# Patient Record
Sex: MALE | Race: BLACK OR AFRICAN AMERICAN | Employment: OTHER | URBAN - METROPOLITAN AREA
[De-identification: names, ages, dates, MRNs, and addresses within clinical notes are randomized per-mention and may not be internally consistent; named-entity substitution may affect disease eponyms.]

---

## 2017-01-09 ENCOUNTER — OFFICE VISIT (OUTPATIENT)
Dept: FAMILY MEDICINE | Facility: CLINIC | Age: 82
End: 2017-01-09
Payer: COMMERCIAL

## 2017-01-09 VITALS
DIASTOLIC BLOOD PRESSURE: 62 MMHG | RESPIRATION RATE: 20 BRPM | WEIGHT: 168.4 LBS | OXYGEN SATURATION: 97 % | HEART RATE: 69 BPM | SYSTOLIC BLOOD PRESSURE: 118 MMHG | HEIGHT: 64 IN | BODY MASS INDEX: 28.75 KG/M2 | TEMPERATURE: 97.4 F

## 2017-01-09 DIAGNOSIS — R06.2 WHEEZING: ICD-10-CM

## 2017-01-09 DIAGNOSIS — M47.812 CERVICAL SPONDYLOSIS WITHOUT MYELOPATHY: ICD-10-CM

## 2017-01-09 DIAGNOSIS — G89.29 CHRONIC LOW BACK PAIN WITH SCIATICA, SCIATICA LATERALITY UNSPECIFIED, UNSPECIFIED BACK PAIN LATERALITY: Chronic | ICD-10-CM

## 2017-01-09 DIAGNOSIS — M54.40 CHRONIC LOW BACK PAIN WITH SCIATICA, SCIATICA LATERALITY UNSPECIFIED, UNSPECIFIED BACK PAIN LATERALITY: Chronic | ICD-10-CM

## 2017-01-09 DIAGNOSIS — R31.0 GROSS HEMATURIA: Primary | ICD-10-CM

## 2017-01-09 DIAGNOSIS — G89.29 CHRONIC RIGHT-SIDED LOW BACK PAIN WITH SCIATICA, SCIATICA LATERALITY UNSPECIFIED: ICD-10-CM

## 2017-01-09 DIAGNOSIS — N28.1 RENAL CYST: ICD-10-CM

## 2017-01-09 DIAGNOSIS — R11.0 NAUSEA: ICD-10-CM

## 2017-01-09 DIAGNOSIS — M25.561 ACUTE PAIN OF RIGHT KNEE: Chronic | ICD-10-CM

## 2017-01-09 DIAGNOSIS — E78.5 HYPERLIPIDEMIA LDL GOAL <100: Chronic | ICD-10-CM

## 2017-01-09 DIAGNOSIS — G89.29 CHRONIC PAIN OF RIGHT KNEE: Chronic | ICD-10-CM

## 2017-01-09 DIAGNOSIS — I10 HYPERTENSION GOAL BP (BLOOD PRESSURE) < 140/80: Chronic | ICD-10-CM

## 2017-01-09 DIAGNOSIS — H92.02 OTALGIA OF LEFT EAR: ICD-10-CM

## 2017-01-09 DIAGNOSIS — E11.9 TYPE 2 DIABETES MELLITUS WITHOUT COMPLICATION, WITHOUT LONG-TERM CURRENT USE OF INSULIN (H): Chronic | ICD-10-CM

## 2017-01-09 DIAGNOSIS — H04.123 DRY EYES: ICD-10-CM

## 2017-01-09 DIAGNOSIS — E55.9 VITAMIN D DEFICIENCY: ICD-10-CM

## 2017-01-09 DIAGNOSIS — M54.40 CHRONIC RIGHT-SIDED LOW BACK PAIN WITH SCIATICA, SCIATICA LATERALITY UNSPECIFIED: ICD-10-CM

## 2017-01-09 DIAGNOSIS — S80.822A: ICD-10-CM

## 2017-01-09 DIAGNOSIS — M25.561 CHRONIC PAIN OF RIGHT KNEE: Chronic | ICD-10-CM

## 2017-01-09 DIAGNOSIS — K21.9 GASTROESOPHAGEAL REFLUX DISEASE WITHOUT ESOPHAGITIS: Chronic | ICD-10-CM

## 2017-01-09 DIAGNOSIS — R25.1 TREMOR: ICD-10-CM

## 2017-01-09 DIAGNOSIS — I10 ESSENTIAL HYPERTENSION, BENIGN: ICD-10-CM

## 2017-01-09 PROCEDURE — 99214 OFFICE O/P EST MOD 30 MIN: CPT | Performed by: FAMILY MEDICINE

## 2017-01-09 RX ORDER — POLYVINYL ALCOHOL 14 MG/ML
1 SOLUTION/ DROPS OPHTHALMIC PRN
Qty: 15 ML | Status: SHIPPED
Start: 2017-01-09 | End: 2018-06-01

## 2017-01-09 RX ORDER — CARBIDOPA AND LEVODOPA 25; 100 MG/1; MG/1
0.5 TABLET ORAL 3 TIMES DAILY
Qty: 135 TABLET | Refills: 1 | Status: SHIPPED | OUTPATIENT
Start: 2017-01-09 | End: 2018-02-04

## 2017-01-09 RX ORDER — ERGOCALCIFEROL 1.25 MG/1
CAPSULE, LIQUID FILLED ORAL
Qty: 4 CAPSULE | Refills: 5 | Status: SHIPPED | OUTPATIENT
Start: 2017-01-09 | End: 2018-02-04

## 2017-01-09 RX ORDER — ACETAMINOPHEN 500 MG
TABLET ORAL
Qty: 100 TABLET | Refills: 5 | Status: SHIPPED | OUTPATIENT
Start: 2017-01-09 | End: 2018-03-02

## 2017-01-09 RX ORDER — METOPROLOL SUCCINATE 50 MG/1
50 TABLET, EXTENDED RELEASE ORAL AT BEDTIME
Qty: 90 TABLET | Refills: 3 | Status: SHIPPED | OUTPATIENT
Start: 2017-01-09 | End: 2018-02-06

## 2017-01-09 RX ORDER — TRIAMCINOLONE ACETONIDE 1 MG/G
CREAM TOPICAL
Qty: 80 G | Refills: 0 | Status: SHIPPED | OUTPATIENT
Start: 2017-01-09 | End: 2018-06-01

## 2017-01-09 RX ORDER — METOPROLOL SUCCINATE 50 MG/1
50 TABLET, EXTENDED RELEASE ORAL AT BEDTIME
Qty: 90 TABLET | Refills: 0 | Status: SHIPPED | OUTPATIENT
Start: 2017-01-09 | End: 2017-01-09

## 2017-01-09 RX ORDER — ALBUTEROL SULFATE 90 UG/1
2 AEROSOL, METERED RESPIRATORY (INHALATION) EVERY 6 HOURS PRN
Qty: 1 INHALER | Refills: 1 | Status: SHIPPED | OUTPATIENT
Start: 2017-01-09 | End: 2018-06-01

## 2017-01-09 RX ORDER — METOPROLOL SUCCINATE 50 MG/1
50 TABLET, EXTENDED RELEASE ORAL AT BEDTIME
Qty: 90 TABLET | Refills: 0 | Status: SHIPPED | OUTPATIENT
Start: 2017-01-09 | End: 2017-07-18

## 2017-01-09 ASSESSMENT — ANXIETY QUESTIONNAIRES
6. BECOMING EASILY ANNOYED OR IRRITABLE: NOT AT ALL
2. NOT BEING ABLE TO STOP OR CONTROL WORRYING: NOT AT ALL
5. BEING SO RESTLESS THAT IT IS HARD TO SIT STILL: NOT AT ALL
GAD7 TOTAL SCORE: 0
1. FEELING NERVOUS, ANXIOUS, OR ON EDGE: NOT AT ALL
7. FEELING AFRAID AS IF SOMETHING AWFUL MIGHT HAPPEN: NOT AT ALL
IF YOU CHECKED OFF ANY PROBLEMS ON THIS QUESTIONNAIRE, HOW DIFFICULT HAVE THESE PROBLEMS MADE IT FOR YOU TO DO YOUR WORK, TAKE CARE OF THINGS AT HOME, OR GET ALONG WITH OTHER PEOPLE: NOT DIFFICULT AT ALL
3. WORRYING TOO MUCH ABOUT DIFFERENT THINGS: NOT AT ALL

## 2017-01-09 ASSESSMENT — PATIENT HEALTH QUESTIONNAIRE - PHQ9: 5. POOR APPETITE OR OVEREATING: NOT AT ALL

## 2017-01-09 NOTE — PROGRESS NOTES
SUBJECTIVE:                                                    Asha Sanford is a 82 year old male who presents to clinic today for the following health issues:  Health Maintenance Due   Topic Date Due     AHMET QUESTIONNAIRE 1 YEAR  1935     PHQ-9 Q1YR (NO INBASKET)  1935     DIABETIC EDUCATION Q1 YEAR (NO INBASKET)  01/01/1936     EYE EXAM Q1 YEAR( NO INBASKET)  09/11/2015     PNEUMOCOCCAL (2 of 2 - PCV13) 10/11/2015     FOOT EXAM Q1 YEAR( NO INBASKET)  01/05/2017     Health Maintenance reviewed at today's visit patient asked to schedule/complete:   Depression:  Completed at today's visit.    ED/UC Followup:    Facility:  [facility]  Date of visit: 1/1/2017  Reason for visit: hematuria  Current Status: feeling better     Indication:  Hematuria.    Technique:  Axial images were acquired through the abdomen and pelvis prior to and then after the intravenous administration of 100 mL of Omnipaque (utilizing a split bolus protocol).  Sagittal and coronal reconstructions.    Comparison:  None.    Findings:  Visualized lower chest:  Heart size is upper limits of normal.  No pericardial effusion.  In general, the lung bases are clear.  No pleural fluid.    Kidneys, ureters and urinary bladder:  No urinary tract stones or hydroureteronephrosis.  There is normal symmetric enhancement of the kidneys.  No significant renal cortical atrophy.  There are a few small renal cysts, the largest of which is seen as on the left measuring 3.4 cm.  No suspicious renal lesions.  The opacified portions of the renal collecting systems and ureters appear unremarkable.  Specifically, no filling defects are identified.  No significant urinary bladder wall thickening and no discrete bladder wall mass.  Remainder of the abdomen and pelvis:  The liver, gallbladder and bile ducts, pancreas, spleen, and adrenal glands are within normal limits.  There are mild atherosclerotic changes.  Normal caliber abdominal aorta.  The stomach, small,  "and large bowel are essentially unremarkable.  There is no free air or free fluid.  No lymphadenopathy.  There are fat containing inguinal hernias bilaterally, left greater than right. The prostate is moderately enlarged, contains numerous calcifications.   Bones:  Degenerative changes.  No acute bony abnormality or suspicious bony lesion.    Impression:  1. Other than a few renal cysts, the kidneys, ureters and urinary bladder are grossly unremarkable.  Specifically, no CT findings to account for the patient`s hematuria.  2. Incidental findings as noted.    Dictated by Prashanth Collado MD @ Jan 1 2017  3:34PM    Signed by Dr. Prashanth Collado @ Jan 1 2017  3:47PM    BURNING URINATION IMPROVED  HYPERTENSION WITH GOAL OF LESS THAN 140/80 IMPROVED TODAY HIGH AT [facility] EMERGENCY ROOM   TREMOR IS RIGHT HAND   NECK PAIN IMPROVED   VITAMIN D REPLACEMENT   MILD INTERMITTENT MEMORY LOSS  OVER WEIGHT   POOR SLEEPING  GASTROESOPHAGEAL REFLUX DISEASE WITHOUT ESOPHAGITIS   HYPERTENSION WITH GOAL OF LESS THAN 140/80   LDL OR \"BAD\" CHOLESTEROL  GOAL < 100   DIABETES 2 GOAL 8% BORDERLINE  ONLY   LOWER BACK PAIN AND KNEE PAIN ONGOING BUT IMPROVED   CHRONIC HEADACHES HAVE  HISTORY OF URINARY TRACT INFECTION AND BLADDER STONES IN THE PAST  HISTORY OF LOWER SERUM SODIUM         Problem list and histories reviewed & adjusted, as indicated.  Additional history: as documented      Patient Active Problem List   Diagnosis     Health Care Home     Dysuria     Essential and other specified forms of tremor     Abnormal involuntary movement     Lipoma     Cervical spondylosis without myelopathy     Degeneration of cervical intervertebral disc     Vitamin D deficiency     Memory loss     Obesity     Insomnia     Gastroesophageal reflux disease without esophagitis     Hyperlipidemia LDL goal <100     Pain in joint involving lower leg     Hypertension goal BP (blood pressure) < 140/80     Type 2 diabetes mellitus without " complications (H)     Lumbago     Knee pain     Headache     Nausea     UTI (urinary tract infection)     Lung nodule seen on imaging study     Bladder stones     ACP (advance care planning)     Hyponatremia     Tension headache     Past Surgical History   Procedure Laterality Date     Cl aff surgical pathology  7/1/2010     Eye surgery  2003     retinal detatch     Shoulder surgery         Social History   Substance Use Topics     Smoking status: Never Smoker      Smokeless tobacco: Never Used     Alcohol Use: No     Family History   Problem Relation Age of Onset     Unknown/Adopted Mother      Unknown/Adopted Father          Current Outpatient Prescriptions   Medication Sig Dispense Refill     metoprolol (TOPROL-XL) 50 MG 24 hr tablet Take 1 tablet (50 mg) by mouth At Bedtime 90 tablet 3     blood glucose monitoring (NO BRAND SPECIFIED) meter device kit Use to test blood sugar  Twice daily  times daily or as directed. 1 kit 0     blood glucose (NO BRAND SPECIFIED) lancets standard Use to test blood sugar  dialy times daily or as directed. 1 Box 0     blood glucose monitoring (NO BRAND SPECIFIED) test strip Use to test blood sugars  Daily  times daily or as directed 100 strip 0     blood glucose calibration (NO BRAND SPECIFIED) solution Use to calibrate blood glucose monitor as directed. 1 each 11     aspirin-acetaminophen-caffeine (EXCEDRIN MIGRAINE) 250-250-65 MG per tablet Take 1 tablet by mouth every 6 hours as needed for headaches 40 tablet 3     tamsulosin (FLOMAX) 0.4 MG 24 hr capsule TAKE 1 CAPSULE BY MOUTH EVERY DAY 90 capsule 3     finasteride (PROSCAR) 5 MG tablet Take 1 tablet (5 mg) by mouth daily 90 tablet 3     polyethylene glycol 0.4%- propylene glycol 0.3% (SYSTANE) 0.4-0.3 % SOLN ophthalmic solution Place 1 drop into both eyes 4 times daily as needed for dry eyes 1 Bottle 11     gabapentin (NEURONTIN) 100 MG capsule Take 1 capsule (100 mg) by mouth 2 times daily 180 capsule 3      aspirin-acetaminophen-caffeine (EXCEDRIN MIGRAINE) 250-250-65 MG per tablet Take 1 tablet by mouth every 6 hours as needed for headaches 80 tablet 1     atorvastatin (LIPITOR) 10 MG tablet Take 1 tablet (10 mg) by mouth daily 90 tablet 3     neomycin-polymyxin-hydrocortisone (CORTISPORIN) 3.5-47678-1 otic suspension Place 4 drops in ear(s) 4 times daily 10 mL 0     vitamin D (ERGOCALCIFEROL) 08966 UNIT capsule TAKE 1 CAPSULE BY MOUTH EVERY WEEK 4 capsule 5     acetaminophen (Q-PAP EXTRA STRENGTH) 500 MG tablet TAKE 1 TABLET BY MOUTH EVERY 6 HOURS AS NEEDED 100 tablet 5     omeprazole (PRILOSEC) 20 MG capsule TAKE ONE CAPSULE BY MOUTH ONCE DAILY EVERY MORNING ONE-HALF HOUR BEFORE A MEAL 90 capsule 3     albuterol (PROAIR HFA, PROVENTIL HFA, VENTOLIN HFA) 108 (90 BASE) MCG/ACT inhaler Inhale 2 puffs into the lungs every 6 hours as needed for shortness of breath / dyspnea or wheezing 1 Inhaler 1     carbidopa-levodopa (SINEMET)  MG per tablet Take 0.5 tablets by mouth 3 times daily 135 tablet 1     lidocaine (XYLOCAINE) 5 % ointment One application 4 x daily to affected areas lower back and knees if necessary 142 g 11     ACE/ARB NOT PRESCRIBED, INTENTIONAL, ACE & ARB not prescribed due to Intolerance and Other: hypokalemia x 2 episodes       Omega-3 Fatty Acids (FISH OIL MAXIMUM STRENGTH) 1200 MG CPDR Take 1 capsule by mouth daily 90 capsule 3     ondansetron (ZOFRAN-ODT) 4 MG disintegrating tablet Take 1 tablet (4 mg) by mouth every 6 hours as needed for nausea 20 tablet 0     aspirin 81 MG tablet Take 1 tablet (81 mg) by mouth daily Correct ASA 30 tablet 11     triamcinolone (KENALOG) 0.1 % cream Apply sparingly to affected area three times daily as needed 80 g 0     polyvinyl alcohol (ARTIFICIAL TEARS) 1.4 % ophthalmic solution Place 1 drop into both eyes as needed for dry eyes 15 mL [PRN     [DISCONTINUED] metoprolol (TOPROL-XL) 50 MG 24 hr tablet Take 1 tablet (50 mg) by mouth At Bedtime 90 tablet 0      [DISCONTINUED] metoprolol (TOPROL-XL) 50 MG 24 hr tablet Take 1 tablet (50 mg) by mouth At Bedtime 90 tablet 0     HYDROcodone-acetaminophen (NORCO) 5-325 MG per tablet Take 1 tablet by mouth every 8 hours as needed for moderate to severe pain 40 tablet 0     No Known Allergies  Recent Labs   Lab Test  08/22/16   1427  03/21/16   1019  06/16/15   1045  05/06/15   0727   05/05/15   0818   09/11/14   1547  06/02/14   1121   A1C  5.7   --   5.7   --    --    --    --   5.5   --    LDL  147*   --    --   61   --    --    --   121  125   HDL  52   --    --   61   --    --    --    --   48   TRIG  144   --    --   75   --    --    --    --   98   ALT  19   --   11  26   --   26   < >   --   26   CR  0.99  1.10  0.90  0.81   < >  0.86   < >  0.90  1.00   GFRESTIMATED  73  65  82  >90  Non  GFR Calc     < >  87   < >  82  72   GFRESTBLACK  88  78  >90  >90   GFR Calc     < >  >90   GFR Calc     < >  >90  88   POTASSIUM  4.8  4.1  4.3  3.6   < >  3.6   < >   --   4.8   TSH   --    --    --    --    --   0.56   --    --   0.72    < > = values in this interval not displayed.      BP Readings from Last 3 Encounters:   01/09/17 118/62   12/12/16 118/64   10/18/16 122/64    Wt Readings from Last 3 Encounters:   01/09/17 168 lb 6.4 oz (76.386 kg)   12/12/16 170 lb 6.4 oz (77.293 kg)   10/18/16 168 lb 12.8 oz (76.567 kg)                  Labs reviewed in EPIC  Problem list, Medication list, Allergies, and Medical/Social/Surgical histories reviewed in [facility] and updated as appropriate.    ROS: has Health Care Home; Dysuria; Essential and other specified forms of tremor; Abnormal involuntary movement; Lipoma; Cervical spondylosis without myelopathy; Degeneration of cervical intervertebral disc; Vitamin D deficiency; Memory loss; Obesity; Insomnia; Gastroesophageal reflux disease without esophagitis; Hyperlipidemia LDL goal <100; Pain in joint involving lower leg; Hypertension goal BP  "(blood pressure) < 140/80; Type 2 diabetes mellitus without complications (H); Lumbago; Knee pain; Headache; Nausea; UTI (urinary tract infection); Lung nodule seen on imaging study; Bladder stones; ACP (advance care planning); Hyponatremia; and Tension headache on his problem list.    C: NEGATIVE for fever, chills, change in weight  I: NEGATIVE for worrisome rashes, moles or lesions  E: NEGATIVE for vision changes or irritation  E/M: NEGATIVE for ear, mouth and throat problems  R: NEGATIVE for significant cough or SOB  B: NEGATIVE for masses, tenderness or discharge  CV: NEGATIVE for chest pain, palpitations or peripheral edema  GI: NEGATIVE for nausea, abdominal pain, heartburn, or change in bowel habits  : NEGATIVE for frequency, dysuria, or hematuria  M: NEGATIVE for significant arthralgias or myalgia  N: NEGATIVE for weakness, dizziness or paresthesias  E: NEGATIVE for temperature intolerance, skin/hair changes  H: NEGATIVE for bleeding problems  P: NEGATIVE for changes in mood or affect    OBJECTIVE:                                                    /62 mmHg  Pulse 69  Temp(Src) 97.4  F (36.3  C) (Tympanic)  Resp 20  Ht 5' 4\" (1.626 m)  Wt 168 lb 6.4 oz (76.386 kg)  BMI 28.89 kg/m2  SpO2 97%  Body mass index is 28.89 kg/(m^2).  GENERAL: healthy, alert and no distress  EYES: Eyes grossly normal to inspection, PERRL and conjunctivae and sclerae normal  HENT: ear canals and TM's normal, nose and mouth without ulcers or lesions  NECK: no adenopathy, no asymmetry, masses, or scars and thyroid normal to palpation  RESP: lungs clear to auscultation - no rales, rhonchi or wheezes  CV: regular rate and rhythm, normal S1 S2, no S3 or S4, no murmur, click or rub, no peripheral edema and peripheral pulses strong  ABDOMEN: soft, nontender, no hepatosplenomegaly, no masses and bowel sounds normal  MS: no gross musculoskeletal defects noted, no edema    Diagnostic Test Results:  Results for orders placed or " performed in visit on 08/22/16   Lipid panel reflex to direct LDL   Result Value Ref Range    Cholesterol 228 (H) <200 mg/dL    Triglycerides 144 <150 mg/dL    HDL Cholesterol 52 >39 mg/dL    LDL Cholesterol Calculated 147 (H) <100 mg/dL    Non HDL Cholesterol 176 (H) <130 mg/dL   MICROALBUMIN QUANTITATIVE RANDOM URINE   Result Value Ref Range    Creatinine Urine 47 mg/dL    Albumin Urine mg/L 7 mg/L    Albumin Urine mg/g Cr 15.98 0 - 17 mg/g Cr   Basic metabolic panel   Result Value Ref Range    Sodium 139 133 - 144 mmol/L    Potassium 4.8 3.4 - 5.3 mmol/L    Chloride 102 94 - 109 mmol/L    Carbon Dioxide 30 20 - 32 mmol/L    Anion Gap 7.2 3 - 14 mmol/L    Glucose 95 70 - 99 mg/dL    Urea Nitrogen 12 7 - 30 mg/dL    Creatinine 0.99 0.66 - 1.25 mg/dL    GFR Estimate 73 >60 mL/min/1.7m2    GFR Estimate If Black 88 >60 mL/min/1.7m2    Calcium 9.3 8.5 - 10.4 mg/dL   Hemoglobin A1c   Result Value Ref Range    Hemoglobin A1C 5.7 4.3 - 6.0 %   ALT   Result Value Ref Range    ALT 19 0 - 70 U/L        ASSESSMENT/PLAN:                                                          ICD-10-CM    1. Gross hematuria R31.0 UROLOGY ADULT REFERRAL     CANCELED: UROLOGY ADULT REFERRAL     CANCELED: UROLOGY ADULT REFERRAL   2. Renal cyst N28.1 UROLOGY ADULT REFERRAL     CANCELED: UROLOGY ADULT REFERRAL     CANCELED: UROLOGY ADULT REFERRAL   3. Hyperlipidemia LDL goal <100 E78.5 aspirin 81 MG tablet   4. Hypertension goal BP (blood pressure) < 140/80 I10 aspirin 81 MG tablet   5. Type 2 diabetes mellitus without complication, without long-term current use of insulin (H) E11.9    6. Gastroesophageal reflux disease without esophagitis K21.9 omeprazole (PRILOSEC) 20 MG CR capsule   7. Chronic low back pain with sciatica, sciatica laterality unspecified, unspecified back pain laterality M54.40 acetaminophen (Q-PAP EXTRA STRENGTH) 500 MG tablet    G89.29    8. Chronic pain of right knee M25.561     G89.29    9. Essential hypertension, benign I10  metoprolol (TOPROL-XL) 50 MG 24 hr tablet     metoprolol (TOPROL-XL) 50 MG 24 hr tablet     DISCONTINUED: metoprolol (TOPROL-XL) 50 MG 24 hr tablet   10. Cervical spondylosis without myelopathy M47.812    11. Blister of leg, left, initial encounter S80.822A triamcinolone (KENALOG) 0.1 % cream   12. Nausea R11.0    13. Chronic right-sided low back pain with sciatica, sciatica laterality unspecified M54.40     G89.29    14. Tremor R25.1 carbidopa-levodopa (SINEMET)  MG per tablet   15. Wheezing R06.2 albuterol (PROAIR HFA/PROVENTIL HFA/VENTOLIN HFA) 108 (90 BASE) MCG/ACT Inhaler   16. Acute pain of right knee M25.561 acetaminophen (Q-PAP EXTRA STRENGTH) 500 MG tablet   17. Vitamin D deficiency E55.9 vitamin D (ERGOCALCIFEROL) 42029 UNIT capsule   18. Otalgia of left ear H92.02    19. Dry eyes H04.123 polyvinyl alcohol (ARTIFICIAL TEARS) 1.4 % ophthalmic solution       Patient Instructions   (R31.0) Gross hematuria  (primary encounter diagnosis)  Comment:    Plan: UROLOGY ADULT REFERRAL             (N28.1) Renal cyst  Comment:    Plan: UROLOGY ADULT REFERRAL             (E78.5) Hyperlipidemia LDL goal <100  Comment:    Plan: aspirin 81 MG tablet             (I10) Hypertension goal BP (blood pressure) < 140/80  Comment:    Plan: aspirin 81 MG tablet             (E11.9) Type 2 diabetes mellitus without complication, without long-term current use of insulin (H)  Comment:    Plan:      (K21.9) Gastroesophageal reflux disease without esophagitis  Comment:    Plan: omeprazole (PRILOSEC) 20 MG CR capsule             (M54.40,  G89.29) Chronic low back pain with sciatica, sciatica laterality unspecified, unspecified back pain laterality  Comment:    Plan: acetaminophen (Q-PAP EXTRA STRENGTH) 500 MG         tablet             (M25.561,  G89.29) Chronic pain of right knee  Comment:    Plan:      (I10) Essential hypertension, benign  Comment:    Plan: metoprolol (TOPROL-XL) 50 MG 24 hr tablet,         metoprolol (TOPROL-XL)  50 MG 24 hr tablet,         DISCONTINUED: metoprolol (TOPROL-XL) 50 MG 24         hr tablet             (M47.812) Cervical spondylosis without myelopathy  Comment:    Plan:      (S80.822A) Blister of leg, left, initial encounter  Comment:    Plan: triamcinolone (KENALOG) 0.1 % cream             (R11.0) Nausea  Comment:    Plan:      (M54.40,  G89.29) Chronic right-sided low back pain with sciatica, sciatica laterality unspecified  Comment:    Plan:      (R25.1) Tremor  Comment:    Plan: carbidopa-levodopa (SINEMET)  MG per         tablet             (R06.2) Wheezing  Comment:    Plan: albuterol (PROAIR HFA/PROVENTIL HFA/VENTOLIN         HFA) 108 (90 BASE) MCG/ACT Inhaler             (M25.561) Acute pain of right knee  Comment:    Plan: acetaminophen (Q-PAP EXTRA STRENGTH) 500 MG         tablet             (E55.9) Vitamin D deficiency  Comment:    Plan: vitamin D (ERGOCALCIFEROL) 51287 UNIT capsule             (H92.02) Otalgia of left ear  Comment:    Plan:      (H04.123) Dry eyes  Comment:    Plan: polyvinyl alcohol (ARTIFICIAL TEARS) 1.4 %         ophthalmic solution                        AMY BURNS MD  [facility]

## 2017-01-09 NOTE — NURSING NOTE
"Chief Complaint   Patient presents with     ER F/U       Initial /62 mmHg  Pulse 69  Temp(Src) 97.4  F (36.3  C) (Tympanic)  Resp 20  Ht 5' 4\" (1.626 m)  Wt 168 lb 6.4 oz (76.386 kg)  BMI 28.89 kg/m2  SpO2 97% Estimated body mass index is 28.89 kg/(m^2) as calculated from the following:    Height as of this encounter: 5' 4\" (1.626 m).    Weight as of this encounter: 168 lb 6.4 oz (76.386 kg).  BP completed using cuff size: scott Mcfarlane CMA      "

## 2017-01-09 NOTE — PATIENT INSTRUCTIONS
(R31.0) Gross hematuria  (primary encounter diagnosis)  Comment:    Plan: UROLOGY ADULT REFERRAL             (N28.1) Renal cyst  Comment:    Plan: UROLOGY ADULT REFERRAL             (E78.5) Hyperlipidemia LDL goal <100  Comment:    Plan: aspirin 81 MG tablet             (I10) Hypertension goal BP (blood pressure) < 140/80  Comment:    Plan: aspirin 81 MG tablet             (E11.9) Type 2 diabetes mellitus without complication, without long-term current use of insulin (H)  Comment:    Plan:      (K21.9) Gastroesophageal reflux disease without esophagitis  Comment:    Plan: omeprazole (PRILOSEC) 20 MG CR capsule             (M54.40,  G89.29) Chronic low back pain with sciatica, sciatica laterality unspecified, unspecified back pain laterality  Comment:    Plan: acetaminophen (Q-PAP EXTRA STRENGTH) 500 MG         tablet             (M25.561,  G89.29) Chronic pain of right knee  Comment:    Plan:      (I10) Essential hypertension, benign  Comment:    Plan: metoprolol (TOPROL-XL) 50 MG 24 hr tablet,         metoprolol (TOPROL-XL) 50 MG 24 hr tablet,         DISCONTINUED: metoprolol (TOPROL-XL) 50 MG 24         hr tablet             (M47.812) Cervical spondylosis without myelopathy  Comment:    Plan:      (S80.822A) Blister of leg, left, initial encounter  Comment:    Plan: triamcinolone (KENALOG) 0.1 % cream             (R11.0) Nausea  Comment:    Plan:      (M54.40,  G89.29) Chronic right-sided low back pain with sciatica, sciatica laterality unspecified  Comment:    Plan:      (R25.1) Tremor  Comment:    Plan: carbidopa-levodopa (SINEMET)  MG per         tablet             (R06.2) Wheezing  Comment:    Plan: albuterol (PROAIR HFA/PROVENTIL HFA/VENTOLIN         HFA) 108 (90 BASE) MCG/ACT Inhaler             (M25.561) Acute pain of right knee  Comment:    Plan: acetaminophen (Q-PAP EXTRA STRENGTH) 500 MG         tablet             (E55.9) Vitamin D deficiency  Comment:    Plan: vitamin D (ERGOCALCIFEROL) 94803 UNIT  capsule             (H92.02) Otalgia of left ear  Comment:    Plan:      (H04.123) Dry eyes  Comment:    Plan: polyvinyl alcohol (ARTIFICIAL TEARS) 1.4 %         ophthalmic solution

## 2017-01-09 NOTE — MR AVS SNAPSHOT
After Visit Summary   1/9/2017    Asha Sanford    MRN: [id number]           Patient Information     Date Of Birth          1935        Visit Information        Provider Department      1/9/2017 1:00 PM Edwardo Msoer MD; [employer] [facility]        Today's Diagnoses     Gross hematuria    -  1     Renal cyst         Hyperlipidemia LDL goal <100         Hypertension goal BP (blood pressure) < 140/80         Type 2 diabetes mellitus without complication, without long-term current use of insulin (H)         Gastroesophageal reflux disease without esophagitis         Chronic low back pain with sciatica, sciatica laterality unspecified, unspecified back pain laterality         Chronic pain of right knee         Essential hypertension, benign         Cervical spondylosis without myelopathy         Blister of leg, left, initial encounter         Nausea         Chronic right-sided low back pain with sciatica, sciatica laterality unspecified         Tremor         Wheezing         Acute pain of right knee         Vitamin D deficiency         Otalgia of left ear         Dry eyes           Care Instructions    (R31.0) Gross hematuria  (primary encounter diagnosis)  Comment:    Plan: UROLOGY ADULT REFERRAL             (N28.1) Renal cyst  Comment:    Plan: UROLOGY ADULT REFERRAL             (E78.5) Hyperlipidemia LDL goal <100  Comment:    Plan: aspirin 81 MG tablet             (I10) Hypertension goal BP (blood pressure) < 140/80  Comment:    Plan: aspirin 81 MG tablet             (E11.9) Type 2 diabetes mellitus without complication, without long-term current use of insulin (H)  Comment:    Plan:      (K21.9) Gastroesophageal reflux disease without esophagitis  Comment:    Plan: omeprazole (PRILOSEC) 20 MG CR capsule             (M54.40,  G89.29) Chronic low back pain with sciatica, sciatica laterality unspecified, unspecified back pain  laterality  Comment:    Plan: acetaminophen (Q-PAP EXTRA STRENGTH) 500 MG         tablet             (M25.561,  G89.29) Chronic pain of right knee  Comment:    Plan:      (I10) Essential hypertension, benign  Comment:    Plan: metoprolol (TOPROL-XL) 50 MG 24 hr tablet,         metoprolol (TOPROL-XL) 50 MG 24 hr tablet,         DISCONTINUED: metoprolol (TOPROL-XL) 50 MG 24         hr tablet             (M47.812) Cervical spondylosis without myelopathy  Comment:    Plan:      (S80.822A) Blister of leg, left, initial encounter  Comment:    Plan: triamcinolone (KENALOG) 0.1 % cream             (R11.0) Nausea  Comment:    Plan:      (M54.40,  G89.29) Chronic right-sided low back pain with sciatica, sciatica laterality unspecified  Comment:    Plan:      (R25.1) Tremor  Comment:    Plan: carbidopa-levodopa (SINEMET)  MG per         tablet             (R06.2) Wheezing  Comment:    Plan: albuterol (PROAIR HFA/PROVENTIL HFA/VENTOLIN         HFA) 108 (90 BASE) MCG/ACT Inhaler             (M25.561) Acute pain of right knee  Comment:    Plan: acetaminophen (Q-PAP EXTRA STRENGTH) 500 MG         tablet             (E55.9) Vitamin D deficiency  Comment:    Plan: vitamin D (ERGOCALCIFEROL) 35572 UNIT capsule             (H92.02) Otalgia of left ear  Comment:    Plan:      (H04.123) Dry eyes  Comment:    Plan: polyvinyl alcohol (ARTIFICIAL TEARS) 1.4 %         ophthalmic solution                        Follow-ups after your visit        Additional Services     UROLOGY ADULT REFERRAL                 Who to contact     If you have questions or need follow up information about today's clinic visit or your schedule please contact [facility] directly at [phone].  Normal or non-critical lab and imaging results will be communicated to you by MyChart, letter or phone within 4 business days after the clinic has received the results. If you do not hear from us within 7 days, please contact the  "clinic through [employer] or phone. If you have a critical or abnormal lab result, we will notify you by phone as soon as possible.  Submit refill requests through [employer] or call your pharmacy and they will forward the refill request to us. Please allow 3 business days for your refill to be completed.          Additional Information About Your Visit        [employer] Information     [employer] lets you send messages to your doctor, view your test results, renew your prescriptions, schedule appointments and more. To sign up, go to [email or web address] . Click on \"Log in\" on the left side of the screen, which will take you to the Welcome page. Then click on \"Sign up Now\" on the right side of the page.     You will be asked to enter the access code listed below, as well as some personal information. Please follow the directions to create your username and password.     Your access code is: [email or web address]  Expires: 3/12/2017 11:32 AM     Your access code will  in 90 days. If you need help or a new code, please call your [place] clinic or [phone].        Care EveryWhere ID     This is your Care EveryWhere ID. This could be used by other organizations to access your [place] medical records  [id number]        Your Vitals Were     Pulse Temperature Respirations Height BMI (Body Mass Index) Pulse Oximetry    69 97.4  F (36.3  C) (Tympanic) 20 5' 4\" (1.626 m) 28.89 kg/m2 97%       Blood Pressure from Last 3 Encounters:   17 118/62   16 118/64   10/18/16 122/64    Weight from Last 3 Encounters:   17 168 lb 6.4 oz (76.386 kg)   16 170 lb 6.4 oz (77.293 kg)   10/18/16 168 lb 12.8 oz (76.567 kg)              We Performed the Following     UROLOGY ADULT REFERRAL          Today's Medication Changes          These changes are accurate as of: 17  2:02 PM.  If you have any questions, ask your nurse or doctor.               Start taking these medicines.        Dose/Directions    * metoprolol 50 MG 24 " hr tablet   Commonly known as:  TOPROL-XL   Used for:  Essential hypertension, benign   Started by:  Edwardo Moser MD        Dose:  50 mg   Take 1 tablet (50 mg) by mouth At Bedtime   Quantity:  90 tablet   Refills:  3       * metoprolol 50 MG 24 hr tablet   Commonly known as:  TOPROL-XL   Used for:  Essential hypertension, benign   Started by:  Edwardo Moser MD        Dose:  50 mg   Take 1 tablet (50 mg) by mouth At Bedtime   Quantity:  90 tablet   Refills:  0       * Notice:  This list has 2 medication(s) that are the same as other medications prescribed for you. Read the directions carefully, and ask your doctor or other care provider to review them with you.      Stop taking these medicines if you haven't already. Please contact your care team if you have questions.     FISH OIL MAXIMUM STRENGTH 1200 MG Cpdr   Stopped by:  Edwardo Moser MD           HYDROcodone-acetaminophen 5-325 MG per tablet   Commonly known as:  NORCO   Stopped by:  Edwardo Moser MD           ondansetron 4 MG ODT tab   Commonly known as:  ZOFRAN-ODT   Stopped by:  Edwardo Moser MD                Where to get your medicines      These medications were sent to [facility] [address] [place] [address] & [address] [address]     Phone:  [phone]    - acetaminophen 500 MG tablet  - albuterol 108 (90 BASE) MCG/ACT Inhaler  - aspirin 81 MG tablet  - carbidopa-levodopa  MG per tablet  - metoprolol 50 MG 24 hr tablet  - metoprolol 50 MG 24 hr tablet  - omeprazole 20 MG CR capsule  - polyvinyl alcohol 1.4 % ophthalmic solution  - triamcinolone 0.1 % cream  - vitamin D 88062 UNIT capsule             Primary Care Provider Office Phone # Fax #    Edwardo Moser [phone]       [facility] [address] [address]  [facility] [address]        Thank you!     Thank you for choosing  [facility]  for your care. Our goal is always to provide you with excellent care. Hearing back from our patients is one way we can continue to improve our services. Please take a few minutes to complete the written survey that you may receive in the mail after your visit with us. Thank you!             Your Updated Medication List - Protect others around you: Learn how to safely use, store and throw away your medicines at www.disposemymeds.org.          This list is accurate as of: 1/9/17  2:02 PM.  Always use your most recent med list.                   Brand Name Dispense Instructions for use    ACE/ARB NOT PRESCRIBED (INTENTIONAL)      ACE & ARB not prescribed due to Intolerance and Other: hypokalemia x 2 episodes       acetaminophen 500 MG tablet    Q-PAP EXTRA STRENGTH    100 tablet    TAKE 1 TABLET BY MOUTH EVERY 6 HOURS AS NEEDED       albuterol 108 (90 BASE) MCG/ACT Inhaler    PROAIR HFA/PROVENTIL HFA/VENTOLIN HFA    1 Inhaler    Inhale 2 puffs into the lungs every 6 hours as needed for shortness of breath / dyspnea or wheezing       aspirin 81 MG tablet     30 tablet    Take 1 tablet (81 mg) by mouth daily Correct ASA       * aspirin-acetaminophen-caffeine 250-250-65 MG per tablet    EXCEDRIN MIGRAINE    80 tablet    Take 1 tablet by mouth every 6 hours as needed for headaches       * aspirin-acetaminophen-caffeine 250-250-65 MG per tablet    EXCEDRIN MIGRAINE    40 tablet    Take 1 tablet by mouth every 6 hours as needed for headaches       atorvastatin 10 MG tablet    LIPITOR    90 tablet    Take 1 tablet (10 mg) by mouth daily       blood glucose calibration solution    no brand specified    1 each    Use to calibrate blood glucose monitor as directed.       blood glucose lancets standard    no brand specified    1 Box    Use to test blood sugar  dialy times daily or as directed.       blood glucose monitoring meter device kit    no brand specified    1 kit    Use to  test blood sugar  Twice daily  times daily or as directed.       blood glucose monitoring test strip    no brand specified    100 strip    Use to test blood sugars  Daily  times daily or as directed       carbidopa-levodopa  MG per tablet    SINEMET    135 tablet    Take 0.5 tablets by mouth 3 times daily       finasteride 5 MG tablet    PROSCAR    90 tablet    Take 1 tablet (5 mg) by mouth daily       gabapentin 100 MG capsule    NEURONTIN    180 capsule    Take 1 capsule (100 mg) by mouth 2 times daily       lidocaine 5 % ointment    XYLOCAINE    142 g    One application 4 x daily to affected areas lower back and knees if necessary       * metoprolol 50 MG 24 hr tablet    TOPROL-XL    90 tablet    Take 1 tablet (50 mg) by mouth At Bedtime       * metoprolol 50 MG 24 hr tablet    TOPROL-XL    90 tablet    Take 1 tablet (50 mg) by mouth At Bedtime       neomycin-polymyxin-hydrocortisone 3.5-35309-6 otic suspension    CORTISPORIN    10 mL    Place 4 drops in ear(s) 4 times daily       omeprazole 20 MG CR capsule    priLOSEC    90 capsule    TAKE ONE CAPSULE BY MOUTH ONCE DAILY EVERY MORNING ONE-HALF HOUR BEFORE A MEAL       polyethylene glycol 0.4%- propylene glycol 0.3% 0.4-0.3 % Soln ophthalmic solution    SYSTANE    1 Bottle    Place 1 drop into both eyes 4 times daily as needed for dry eyes       polyvinyl alcohol 1.4 % ophthalmic solution    ARTIFICIAL TEARS    15 mL    Place 1 drop into both eyes as needed for dry eyes       tamsulosin 0.4 MG capsule    FLOMAX    90 capsule    TAKE 1 CAPSULE BY MOUTH EVERY DAY       triamcinolone 0.1 % cream    KENALOG    80 g    Apply sparingly to affected area three times daily as needed       vitamin D 29377 UNIT capsule    ERGOCALCIFEROL    4 capsule    TAKE 1 CAPSULE BY MOUTH EVERY WEEK       * Notice:  This list has 4 medication(s) that are the same as other medications prescribed for you. Read the directions carefully, and ask your doctor or other care provider to  review them with you.

## 2017-01-10 ASSESSMENT — ANXIETY QUESTIONNAIRES: GAD7 TOTAL SCORE: 0

## 2017-01-10 ASSESSMENT — PATIENT HEALTH QUESTIONNAIRE - PHQ9: SUM OF ALL RESPONSES TO PHQ QUESTIONS 1-9: 0

## 2017-03-31 ENCOUNTER — CARE COORDINATION (OUTPATIENT)
Dept: GERIATRIC MEDICINE | Facility: CLINIC | Age: 82
End: 2017-03-31

## 2017-03-31 NOTE — PROGRESS NOTES
[facility] Six-Month Telephone Assessment    6 month telephone assessment completed on 3/31/17  ER visits: No  Hospitalizations: No  TCU stays: No  Significant health status changes: No  Falls/Injuries: No  ADL/IADL changes: No  Changes in services: No    Caregiver Assessment follow up:  No    Goals: See POC in chart for goal progress documentation.      Will see client in 6 months for an annual health risk assessment.   Encouraged client to call CM with any questions or concerns in the meantime.     Michelle Melchor RN BSN PHN  [facility]   [phone]  Fax: [phone]

## 2017-06-06 ENCOUNTER — CARE COORDINATION (OUTPATIENT)
Dept: GERIATRIC MEDICINE | Facility: CLINIC | Age: 82
End: 2017-06-06

## 2017-06-06 DIAGNOSIS — Z76.89 HEALTH CARE HOME: ICD-10-CM

## 2017-06-06 NOTE — PROGRESS NOTES
Received call from client stating that he had switched to [facility] as of 6/5/17. Informed Liam at [facility] that client had stopped attending this [facility] effective 6/5/17. Updated CPS and sent to CMS.

## 2017-07-18 ENCOUNTER — OFFICE VISIT (OUTPATIENT)
Dept: FAMILY MEDICINE | Facility: CLINIC | Age: 82
End: 2017-07-18
Payer: COMMERCIAL

## 2017-07-18 ENCOUNTER — RADIANT APPOINTMENT (OUTPATIENT)
Dept: GENERAL RADIOLOGY | Facility: CLINIC | Age: 82
End: 2017-07-18
Attending: FAMILY MEDICINE
Payer: COMMERCIAL

## 2017-07-18 VITALS
TEMPERATURE: 96.9 F | WEIGHT: 153 LBS | SYSTOLIC BLOOD PRESSURE: 112 MMHG | RESPIRATION RATE: 16 BRPM | HEART RATE: 73 BPM | BODY MASS INDEX: 26.26 KG/M2 | DIASTOLIC BLOOD PRESSURE: 62 MMHG | OXYGEN SATURATION: 98 %

## 2017-07-18 DIAGNOSIS — Z23 NEED FOR PNEUMOCOCCAL VACCINE: ICD-10-CM

## 2017-07-18 DIAGNOSIS — Z11.1 SCREENING EXAMINATION FOR PULMONARY TUBERCULOSIS: ICD-10-CM

## 2017-07-18 DIAGNOSIS — M25.562 CHRONIC PAIN OF BOTH KNEES: ICD-10-CM

## 2017-07-18 DIAGNOSIS — G89.29 CHRONIC LOW BACK PAIN WITH SCIATICA, SCIATICA LATERALITY UNSPECIFIED, UNSPECIFIED BACK PAIN LATERALITY: Chronic | ICD-10-CM

## 2017-07-18 DIAGNOSIS — E78.5 HYPERLIPIDEMIA LDL GOAL <100: Chronic | ICD-10-CM

## 2017-07-18 DIAGNOSIS — E11.9 TYPE 2 DIABETES MELLITUS WITHOUT COMPLICATION, WITHOUT LONG-TERM CURRENT USE OF INSULIN (H): Primary | Chronic | ICD-10-CM

## 2017-07-18 DIAGNOSIS — G89.29 CHRONIC PAIN OF BOTH KNEES: ICD-10-CM

## 2017-07-18 DIAGNOSIS — I10 HYPERTENSION GOAL BP (BLOOD PRESSURE) < 140/80: Chronic | ICD-10-CM

## 2017-07-18 DIAGNOSIS — K21.9 GASTROESOPHAGEAL REFLUX DISEASE WITHOUT ESOPHAGITIS: Chronic | ICD-10-CM

## 2017-07-18 DIAGNOSIS — Z12.5 SPECIAL SCREENING FOR MALIGNANT NEOPLASM OF PROSTATE: ICD-10-CM

## 2017-07-18 DIAGNOSIS — M25.561 CHRONIC PAIN OF BOTH KNEES: ICD-10-CM

## 2017-07-18 DIAGNOSIS — E55.9 VITAMIN D DEFICIENCY: ICD-10-CM

## 2017-07-18 DIAGNOSIS — M54.40 CHRONIC LOW BACK PAIN WITH SCIATICA, SCIATICA LATERALITY UNSPECIFIED, UNSPECIFIED BACK PAIN LATERALITY: Chronic | ICD-10-CM

## 2017-07-18 LAB
CREAT UR-MCNC: 68 MG/DL
HBA1C MFR BLD: 5.4 % (ref 4.3–6)
MICROALBUMIN UR-MCNC: 10 MG/L
MICROALBUMIN/CREAT UR: 15.11 MG/G CR (ref 0–17)

## 2017-07-18 PROCEDURE — 90670 PCV13 VACCINE IM: CPT | Performed by: FAMILY MEDICINE

## 2017-07-18 PROCEDURE — 84460 ALANINE AMINO (ALT) (SGPT): CPT | Performed by: FAMILY MEDICINE

## 2017-07-18 PROCEDURE — 82043 UR ALBUMIN QUANTITATIVE: CPT | Performed by: FAMILY MEDICINE

## 2017-07-18 PROCEDURE — 84443 ASSAY THYROID STIM HORMONE: CPT | Performed by: FAMILY MEDICINE

## 2017-07-18 PROCEDURE — 80048 BASIC METABOLIC PNL TOTAL CA: CPT | Performed by: FAMILY MEDICINE

## 2017-07-18 PROCEDURE — 82306 VITAMIN D 25 HYDROXY: CPT | Performed by: FAMILY MEDICINE

## 2017-07-18 PROCEDURE — 80061 LIPID PANEL: CPT | Performed by: FAMILY MEDICINE

## 2017-07-18 PROCEDURE — 83036 HEMOGLOBIN GLYCOSYLATED A1C: CPT | Performed by: FAMILY MEDICINE

## 2017-07-18 PROCEDURE — 36415 COLL VENOUS BLD VENIPUNCTURE: CPT | Performed by: FAMILY MEDICINE

## 2017-07-18 PROCEDURE — G0009 ADMIN PNEUMOCOCCAL VACCINE: HCPCS | Performed by: FAMILY MEDICINE

## 2017-07-18 PROCEDURE — 71010 XR CHEST 1 VW: CPT

## 2017-07-18 PROCEDURE — 99214 OFFICE O/P EST MOD 30 MIN: CPT | Mod: 25 | Performed by: FAMILY MEDICINE

## 2017-07-18 PROCEDURE — G0103 PSA SCREENING: HCPCS | Performed by: FAMILY MEDICINE

## 2017-07-18 NOTE — MR AVS SNAPSHOT
After Visit Summary   7/18/2017    Asha Sanford    MRN: [id number]           Patient Information     Date Of Birth          1935        Visit Information        Provider Department      7/18/2017 10:00 AM Edwardo Moser MD; [employer] [facility]        Today's Diagnoses     Routine history and physical examination of adult    -  1    Type 2 diabetes mellitus without complication, without long-term current use of insulin (H)        Need for pneumococcal vaccine        Hyperlipidemia LDL goal <100        Hypertension goal BP (blood pressure) < 140/80        Gastroesophageal reflux disease without esophagitis        Chronic low back pain with sciatica, sciatica laterality unspecified, unspecified back pain laterality        Chronic pain of both knees        Vitamin D deficiency        Special screening for malignant neoplasm of prostate        Screening examination for pulmonary tuberculosis          Care Instructions    (Z00.00) Routine history and physical examination of adult  (primary encounter diagnosis)  Comment:    Plan:      (E11.9) Type 2 diabetes mellitus without complication, without long-term current use of insulin (H)  Comment:    Plan: Albumin Random Urine Quantitative, Hemoglobin         A1c, TSH with free T4 reflex, OPHTHALMOLOGY         ADULT REFERRAL             (Z23) Need for pneumococcal vaccine  Comment:    Plan: Pneumococcal vaccine 13 valent PCV13 IM         (Prevnar) [57469], ADMIN: Vaccine, Initial         (78810), ADMIN MEDICARE: Pneumococcal Vaccine         ()             (E78.5) Hyperlipidemia LDL goal <100  Comment:    Plan: Basic metabolic panel, Lipid panel reflex to         direct LDL, ALT, OPHTHALMOLOGY ADULT REFERRAL             (I10) Hypertension goal BP (blood pressure) < 140/80  Comment:    Plan: Basic metabolic panel, OPHTHALMOLOGY ADULT         REFERRAL             (K21.9) Gastroesophageal  "reflux disease without esophagitis  Comment:    Plan:      (M54.40,  G89.29) Chronic low back pain with sciatica, sciatica laterality unspecified, unspecified back pain laterality  Comment:    Plan:      (M25.561,  M25.562,  G89.29) Chronic pain of both knees  Comment:    Plan:      (E55.9) Vitamin D deficiency  Comment:    Plan: Vitamin D Deficiency             (Z12.5) Special screening for malignant neoplasm of prostate  Comment:    Plan: Prostate spec antigen screen             (Z11.1) Screening examination for pulmonary tuberculosis  Comment:    Plan: XR Chest 1 View  .Diabetes: Exchange List  What are the exchange lists?   The exchange lists show you portions of food that equal 1 exchange. Foods are divided into food lists. The foods on each list are called exchanges because they have a similar number of calories, protein, carbohydrate and fat content. Foods from each list can be traded or \"exchanged\" for any other food on the same list because they all have a similar exchange value. A dietitian will help you plan how much food your child should eat at each meal and from what lists the foods should come from. At first you should measure your food until you are able to make good estimates about serving sizes. The following list is a sample of foods found on the exchange lists. For more information, you can buy the Exchange Lists for Meal Planning from: The American Diabetes Association [address] [place] [address] [phone] http://www.diabetes.org  Carbohydrate group   Starch List: One starch exchange contains about 15 grams of carbohydrate, 3 grams of protein, 0 to 1 grams of fat, and 80 calories. A starch exchange is sometimes called a carb exchange. Examples of one starch (carb) exchange are:   one slice of bread   1/2 hamburger or hot dog bun   3/4 cup of unsweetened cereal   1/3 cup pasta   3 cups popcorn   crackers (6 small saltines, 3 squares of haley crackers, 3 of most other crackers)   1 " "pancake or waffle (5 inch)   15 to 20 fat-free or baked potato or corn chips.   The vegetables included in the starch exchanges include:   corn (1/2 cup or 1/2 cob)   white potato (1/4 large baked with skin or 1/2 cup mashed)   yam or sweet potato (1/2 cup)   green peas (1/2 cup)   squash, winter (1 cup)   lima beans (2/3 cup).   Fruit List: 1 fruit exchange contains about 15 grams of carbohydrate and 60 calories. Examples of one fruit exchange are:   grape juice (1/3 cup)   apple or pineapple juice (1/2 cup)   orange or grapefruit juice (1/2 cup)   1 small apple   orange or peach   1/2 banana   1 cup raspberries   1/3 of a small cantaloupe   1 slice of watermelon.   Milk List: 1 milk exchange contains about 8 grams of protein and 12 grams of carbohydrate. Items on the milk list are divided into fat-free, reduced fat, and whole milk depending on the number of fat grams in the exchange. Examples of one milk exchange are: Fat-Free (0 to 3 grams of fat)   1 cup of skim or non-fat milk   1 cup of 1% milk (also includes 1/2 fat exchange)   6 ounces flavored fat-free yogurt   Reduced-Fat (5 grams of fat)   6 ounces of plain, low-fat yogurt   1 cup 2% milk (also includes one fat exchange).   Whole Milk (8 grams of fat)   8 ounces of plain yogurt (made from whole milk)   1 cup whole milk.   Vegetable List: One vegetable exchange has 5 grams of carbohydrate, 2 grams of protein, no fat, and 25 calories. One-half cup of cooked or a cup of raw vegetables is a good measure for 1 exchange of most vegetables. Raw lettuce may be taken in larger quantities, but salad dressing usually equals 1 fat exchange. Other Carbohydrates List: One \"other carbohydrate\" exchange has 15 grams of carbohydrate. Many of these foods count as a starch exchange and one or more fat exchanges.   brownie (2 inch square) = 1 carb, 1 fat exchange   fruit snack roll = 1 carb exchange   granola bar = 1 and 1/2 carb exchanges   ice cream (1/2 cup) = 1 carb, 2 " fat exchanges   frozen yogurt (1/2 cup) = 1 carb, 0 to 1 fat exchanges   tortilla chips (6-12) = 1 carb, 2 fat exchanges.   Meat and Meat Substitute Group   Meats are divided into very lean meats, lean meats, medium-fat meats, and high-fat meats. People with diabetes should try to eat more lean and medium fat meats and stay away from the high fat choices. The Very Lean meat group includes foods that contain 7 grams of protein, 0 to 1 gram of fat, and 35 calories for 1 exchange. Examples include:   1 ounce chicken or turkey (white meat, no skin)   1 ounce fresh fish   1 ounce fat-free cheese   2 egg whites   The Lean meat group includes foods that contain 7 grams of protein, 3 grams of fat, and 55 calories for 1 meat exchange. Examples include:   1 ounce chicken or turkey (dark meat, no skin)   1 ounce fish   1 ounce lean pork   1 ounce USDA Select or Choice grades of lean beef   1 ounce cheese (with 3 grams of fat or less per ounce).   The Medium-Fat group includes foods that have 7 grams of protein, 5 grams of fat, and 75 calories for 1 meat exchange. Examples include:   1 ounce of ground beef, most cuts of beef, pork, lamb or veal   1 ounce of cheese (5 grams of fat per ounce or less)   1 egg   1 ounce fried fish.   The High-Fat foods have 7 grams of protein, 8 grams of fat, and 100 calories for 1 meat exchange. This group includes:   1 ounce of pork sausage   1 ounce of spare ribs   1 oz of regular cheese (American, Swiss etc.)   1 oz of lunch meat   1 hot dog (turkey or chicken).   Fat Group   Fat List: Fat is necessary for the body and is particularly important during periods of fasting (overnight), when it is very slowly absorbed. 1 fat exchange contains 5 grams of fat and 45 calories. The monounsaturated fats and polyunsaturated fats are better for us than saturated fats. The fat list includes: 1 exchange of monounsaturated fats equals:   1/2 Tbsp peanut butter   6 almonds   1 tsp of oil (olive, peanut,  canola).   1 exchange of polyunsaturated fats equals:   1 tsp margarine   1 tsp of any vegetable oil (except coconut).   1 exchange of saturated fat includes:   1 tsp butter   1 strip of cho   2 Tbsp of cream (half and half).   Free Foods   A free food contains less than 20 calories or less than 5 grams of carbohydrate per serving. If the food has a serving size listed on its package, it should be limited to 3 servings spread throughout the day. Examples of free foods include:   4 Tbsp fat-free margarine   1 Tbsp fat-free Miracle Whip   sugar-free gelatin   diet soft drinks   catsup   soy sauce   spices.   Combination Foods   Many foods, such as casseroles, are mixed together. Your dietitian can help you figure out how many exchanges to count for combination foods. For example:   lasagna (1 cup) = 2 carb exchanges and 2 medium-fat meat exchanges   spaghetti with meatballs (1 cup) = 2 carb exchanges and 2 medium-fat meat exchanges   pizza, cheese (1/4 of 12 in.) = 2 carbs, 2 medium-fat meats   chicken noodle soup (1 cup) = 1 carb exchange   frozen entrée (less than 300 calories) = 2 carbs, 3 lean meat exchanges   macaroni and cheese (1 cup) = 2 carb exchanges and 2 medium-fat meat exchanges.   1. MODIFIED FAST 250 CALORIES TWICE DAILY FEMALES  300 CALORIES TWICE DAILY FOR MALES   OATMEAL AND COTTAGE CHEESE WORK NICELY   COPIOUS WATER BETWEEN   5/2 PLAN DR [facility]  NORMAL DIET 5 DAYS PER WEEK  SEMIFAST 2 DAYS PER WEEK  LOOK UP 5-2 PLAN ON THE INTERNET    Weight Loss Tips  1. Do not eat after 6 hrs before your expected bedtime  2. Have your heaviest meal for breakfast, a slightly lighter meal at lunch and a snack 6 hrs before bed  3. No sugar/calorie drinks except milk ie no fruit juice, pop, alcohol.  4. Drink milk OR WATER  30min before meals to decrease your hunger. Also it is excellent as part of your last meal of the day snack  5. Drink lots of water  6. Increase fiber in diet: all bran cereal,  salads, popcorn etc  7. Have only one small serving of fruit a day about 1/2 cup (as this is high in sugar)  8. EXERCISE is the bottom line. Without it, you will gain weight even on a low calorie diet. Best if done 2-3X a day as can    2. The only way known to prevent diabetes or keep it from getting worse is exercise, 20-40 minutes 3 times a day around the time of meals      SLEEP 8 HOURS PER DAY    BLACK AND BLUEBERRIES EVERY DAY ANTINFLAMMATORY BENEFIT     PLAIN YOGURT SEVERAL TIMES DAILY AS TOLERATED IF NOT ALLERGIC     AVOID HIGH FRUCTOSE SYRUP AND OLENE IN YOUR DIET     GREEN TEA EXTRACT    PROBIOTIC CAPSULE DAILY  HIGH QUALITY     DON'T EAT LATE AT NIGHT    CARSHARON FIMBRIATA HELPS WITH APPETITE    KEEP A BLEVita Coco JOURNAL    888 BREATHER WHEN STRESSED OR COUNT BACK FROM 100 WITH SLOW BREATHING    POSITIVE AFFIRMATIONS         FIT BIT OR PEDOMETER 10,000 STEPS PER DAY     FIT LORENA SOTO RECOMMENDED                                   Follow-ups after your visit        Additional Services     OPHTHALMOLOGY ADULT REFERRAL       Your provider has referred you to: [facility] [phone]   [email or web address]    Please be aware that coverage of these services is subject to the terms and limitations of your health insurance plan.  Call member services at your health plan with any benefit or coverage questions.      Please bring the following with you to your appointment:    (1) Any X-Rays, CTs or MRIs which have been performed.  Contact the facility where they were done to arrange for  prior to your scheduled appointment.    (2) List of current medications  (3) This referral request   (4) Any documents/labs given to you for this referral                  Follow-up notes from your care team     Return in about 4 months (around 11/18/2017).      Who to contact     If you have questions or need follow up information about today's clinic visit or  "your schedule please contact [facility] directly at [phone].  Normal or non-critical lab and imaging results will be communicated to you by MyChart, letter or phone within 4 business days after the clinic has received the results. If you do not hear from us within 7 days, please contact the clinic through MyChart or phone. If you have a critical or abnormal lab result, we will notify you by phone as soon as possible.  Submit refill requests through "[employer] or call your pharmacy and they will forward the refill request to us. Please allow 3 business days for your refill to be completed.          Additional Information About Your Visit        [employer] Information     "[employer] lets you send messages to your doctor, view your test results, renew your prescriptions, schedule appointments and more. To sign up, go to [email or web address] . Click on \"Log in\" on the left side of the screen, which will take you to the Welcome page. Then click on \"Sign up Now\" on the right side of the page.     You will be asked to enter the access code listed below, as well as some personal information. Please follow the directions to create your username and password.     Your access code is: [email or web address]  Expires: 10/16/2017 10:58 AM     Your access code will  in 90 days. If you need help or a new code, please call your [place] clinic or [phone].        Care EveryWhere ID     This is your Care EveryWhere ID. This could be used by other organizations to access your [place] medical records  [id number]        Your Vitals Were     Pulse Temperature Respirations Pulse Oximetry BMI (Body Mass Index)       73 96.9  F (36.1  C) (Tympanic) 16 98% 26.26 kg/m2        Blood Pressure from Last 3 Encounters:   17 112/62   17 118/62   16 118/64    Weight from Last 3 Encounters:   17 153 lb (69.4 kg)   17 168 lb 6.4 oz (76.4 kg)   16 170 lb 6.4 oz (77.3 kg)         "      We Performed the Following     ADMIN MEDICARE: Pneumococcal Vaccine ()     ADMIN: Vaccine, Initial (70509)     Albumin Random Urine Quantitative     ALT     Basic metabolic panel     Hemoglobin A1c     Lipid panel reflex to direct LDL     OPHTHALMOLOGY ADULT REFERRAL     Pneumococcal vaccine 13 valent PCV13 IM (Prevnar) [58920]     Prostate spec antigen screen     TSH with free T4 reflex     Vitamin D Deficiency          Today's Medication Changes          These changes are accurate as of: 7/18/17 10:59 AM.  If you have any questions, ask your nurse or doctor.               These medicines have changed or have updated prescriptions.        Dose/Directions    metoprolol 50 MG 24 hr tablet   Commonly known as:  TOPROL-XL   This may have changed:  how much to take   Used for:  Essential hypertension, benign        Dose:  50 mg   Take 1 tablet (50 mg) by mouth At Bedtime   Quantity:  90 tablet   Refills:  3                Primary Care Provider Office Phone # Fax #    Edwardo Linda Moser [phone]       [facility] [address]  [facility] [address]        Equal Access to Services     CHANTE MATOS AH: Hadii aad ku hadasho Soomaali, waaxda luqadaha, qaybta kaalmada adeegyada, waxay idiin hayaan adeeg kharash la'christenn . So [facility] [place] [phone].    ATENCIÓN: Si habla español, tiene a norton disposición servicios gratuitos de asistencia lingüística. Llame al [phone].    We comply with applicable federal civil rights laws and [place] laws. We do not discriminate on the basis of race, color, national origin, age, disability sex, sexual orientation or gender identity.            Thank you!     Thank you for choosing [facility]  for your care. Our goal is always to provide you with excellent care. Hearing back from our patients is one way we can continue to improve our services. Please take a few minutes to complete the written survey that you may  receive in the mail after your visit with us. Thank you!             Your Updated Medication List - Protect others around you: Learn how to safely use, store and throw away your medicines at www.disposemymeds.org.          This list is accurate as of: 7/18/17 10:59 AM.  Always use your most recent med list.                   Brand Name Dispense Instructions for use Diagnosis    ACE/ARB NOT PRESCRIBED (INTENTIONAL)      ACE & ARB not prescribed due to Intolerance and Other: hypokalemia x 2 episodes    Type 2 diabetes mellitus without complications (H), Hyponatremia       acetaminophen 500 MG tablet    Q-PAP EXTRA STRENGTH    100 tablet    TAKE 1 TABLET BY MOUTH EVERY 6 HOURS AS NEEDED    Chronic low back pain with sciatica, sciatica laterality unspecified, unspecified back pain laterality, Acute pain of right knee       albuterol 108 (90 BASE) MCG/ACT Inhaler    PROAIR HFA/PROVENTIL HFA/VENTOLIN HFA    1 Inhaler    Inhale 2 puffs into the lungs every 6 hours as needed for shortness of breath / dyspnea or wheezing    Wheezing       aspirin 81 MG tablet     30 tablet    Take 1 tablet (81 mg) by mouth daily Correct ASA    Hyperlipidemia LDL goal <100, Hypertension goal BP (blood pressure) < 140/80       * aspirin-acetaminophen-caffeine 250-250-65 MG per tablet    EXCEDRIN MIGRAINE    80 tablet    Take 1 tablet by mouth every 6 hours as needed for headaches    Headache(784.0), Cervicalgia       * aspirin-acetaminophen-caffeine 250-250-65 MG per tablet    EXCEDRIN MIGRAINE    40 tablet    Take 1 tablet by mouth every 6 hours as needed for headaches    Episodic tension-type headache, not intractable       atorvastatin 10 MG tablet    LIPITOR    90 tablet    Take 1 tablet (10 mg) by mouth daily    Hyperlipidemia LDL goal <100       blood glucose calibration solution    no brand specified    1 each    Use to calibrate blood glucose monitor as directed.    Type 2 diabetes mellitus without complication, without long-term  current use of insulin (H)       blood glucose lancets standard    no brand specified    1 Box    Use to test blood sugar  dialy times daily or as directed.    Type 2 diabetes mellitus without complication, without long-term current use of insulin (H)       blood glucose monitoring meter device kit    no brand specified    1 kit    Use to test blood sugar  Twice daily  times daily or as directed.    Type 2 diabetes mellitus without complication, without long-term current use of insulin (H)       blood glucose monitoring test strip    no brand specified    100 strip    Use to test blood sugars  Daily  times daily or as directed    Type 2 diabetes mellitus without complication, without long-term current use of insulin (H)       carbidopa-levodopa  MG per tablet    SINEMET    135 tablet    Take 0.5 tablets by mouth 3 times daily    Tremor       finasteride 5 MG tablet    PROSCAR    90 tablet    Take 1 tablet (5 mg) by mouth daily    Hesitancy of micturition       gabapentin 100 MG capsule    NEURONTIN    180 capsule    Take 1 capsule (100 mg) by mouth 2 times daily    Headache(784.0), Cervicalgia       lidocaine 5 % ointment    XYLOCAINE    142 g    One application 4 x daily to affected areas lower back and knees if necessary    Chronic right-sided low back pain with sciatica, sciatica laterality unspecified       metoprolol 50 MG 24 hr tablet    TOPROL-XL    90 tablet    Take 1 tablet (50 mg) by mouth At Bedtime    Essential hypertension, benign       neomycin-polymyxin-hydrocortisone 3.5-84743-6 otic suspension    CORTISPORIN    10 mL    Place 4 drops in ear(s) 4 times daily    Otalgia of left ear       omeprazole 20 MG CR capsule    priLOSEC    90 capsule    TAKE ONE CAPSULE BY MOUTH ONCE DAILY EVERY MORNING ONE-HALF HOUR BEFORE A MEAL    Gastroesophageal reflux disease without esophagitis       polyethylene glycol 0.4%- propylene glycol 0.3% 0.4-0.3 % Soln ophthalmic solution    SYSTANE    1 Bottle    Place 1  drop into both eyes 4 times daily as needed for dry eyes    Dry eyes, bilateral       polyvinyl alcohol 1.4 % ophthalmic solution    ARTIFICIAL TEARS    15 mL    Place 1 drop into both eyes as needed for dry eyes    Dry eyes       tamsulosin 0.4 MG capsule    FLOMAX    90 capsule    TAKE 1 CAPSULE BY MOUTH EVERY DAY    Dysuria       triamcinolone 0.1 % cream    KENALOG    80 g    Apply sparingly to affected area three times daily as needed    Blister of leg, left, initial encounter       vitamin D 55586 UNIT capsule    ERGOCALCIFEROL    4 capsule    TAKE 1 CAPSULE BY MOUTH EVERY WEEK    Vitamin D deficiency       * Notice:  This list has 2 medication(s) that are the same as other medications prescribed for you. Read the directions carefully, and ask your doctor or other care provider to review them with you.

## 2017-07-18 NOTE — PROGRESS NOTES
Please send normal lab letter when labs are complete  NORMAL THREE MONTH GLUCOSE AVERAGE IN NORMAL RANGE  AMY BURNS JR., MD

## 2017-07-18 NOTE — NURSING NOTE
"Chief Complaint   Patient presents with     Diabetes     Forms     [facility]       Initial /62  Pulse 73  Temp 96.9  F (36.1  C) (Tympanic)  Resp 16  Wt 153 lb (69.4 kg)  SpO2 98%  BMI 26.26 kg/m2 Estimated body mass index is 26.26 kg/(m^2) as calculated from the following:    Height as of 1/9/17: 5' 4\" (1.626 m).    Weight as of this encounter: 153 lb (69.4 kg).  Medication Reconciliation: complete   Mary Mcfarlane CMA    "

## 2017-07-18 NOTE — LETTER
[facility]  [address]  [address]  [place] [address] [phone]                                                                                                           Asha Linda Sanford  [address]  [place] [place] [facility] [address]    July 18, 2017      Dear Asha,    The results of your recent tests were reviewed and are enclosed.     NORMAL CHEST XRAY     Results for orders placed or performed in visit on 07/18/17   XR Chest 1 View    Narrative    XR CHEST 1 VW  7/18/2017 11:13 AM    HISTORY:  Encounter for screening for respiratory tuberculosis    COMPARISON:  5/5/2015      Impression    IMPRESSION:  Lungs are clear. No radiographic evidence for active  tuberculosis. Thoracolumbar scoliosis convex right.    MELA MCCLELLAN MD     Thank you for choosing [facility].  We appreciate the opportunity to serve you and look forward to supporting your healthcare needs in the future.    If you have any questions or concerns, please call me or my staff at [phone].      Sincerely,    Edwardo Moser Jr MD

## 2017-07-18 NOTE — LETTER
"      July 19, 2017        Asha Linda Sanford  [address]  [place] [address]        Dear Asha,     NORMAL PSA OR PROSTATE CANCER SCREENING TEST,     NORMAL DIABETES URINE PROTEIN TEST   BORDERLINE  LOW VITAMIN D LEVEL   CONTINUE VITAMIN D REPLACEMENT     BORDERLINE  TOTAL CHOLESTEROL   NORMAL TRIGLYCERIDES   BORDERLINE  LDL OR \"BAD\" CHOLESTEROL   BORDERLINE  HDL OR \"GOOD\" CHOLESTEROL     Results for orders placed or performed in visit on 07/18/17   Basic metabolic panel   Result Value Ref Range    Sodium 140 133 - 144 mmol/L    Potassium 4.3 3.4 - 5.3 mmol/L    Chloride 105 94 - 109 mmol/L    Carbon Dioxide 31 20 - 32 mmol/L    Anion Gap 4 3 - 14 mmol/L    Glucose 68 (L) 70 - 99 mg/dL    Urea Nitrogen 12 7 - 30 mg/dL    Creatinine 0.95 0.66 - 1.25 mg/dL    GFR Estimate 76 >60 mL/min/1.7m2    GFR Estimate If Black >90   GFR Calc   >60 mL/min/1.7m2    Calcium 9.4 8.5 - 10.1 mg/dL   Lipid panel reflex to direct LDL   Result Value Ref Range    Cholesterol 208 (H) <200 mg/dL    Triglycerides 123 <150 mg/dL    HDL Cholesterol 55 >39 mg/dL    LDL Cholesterol Calculated 128 (H) <100 mg/dL    Non HDL Cholesterol 153 (H) <130 mg/dL   Albumin Random Urine Quantitative   Result Value Ref Range    Creatinine Urine 68 mg/dL    Albumin Urine mg/L 10 mg/L    Albumin Urine mg/g Cr 15.11 0 - 17 mg/g Cr   Hemoglobin A1c   Result Value Ref Range    Hemoglobin A1C 5.4 4.3 - 6.0 %   ALT   Result Value Ref Range    ALT 22 0 - 70 U/L   Vitamin D Deficiency   Result Value Ref Range    Vitamin D Deficiency screening 19 (L) 20 - 75 ug/L   TSH with free T4 reflex   Result Value Ref Range    TSH 0.89 0.40 - 4.00 mU/L   Prostate spec antigen screen   Result Value Ref Range    PSA 3.35 0 - 4 ug/L         Sincerely,        AMY BURNS MD          "

## 2017-07-18 NOTE — PROGRESS NOTES
SUBJECTIVE:                                                    Asha Sanford is a 82 year old male who presents to clinic today for the following health issues:  Health Maintenance Due   Topic Date Due     DIABETIC EDUCATION Q1 YEAR  01/01/1936     EYE EXAM Q1 YEAR  09/11/2015     FOOT EXAM Q1 YEAR  01/05/2017     Health Maintenance reviewed at today's visit patient asked to schedule/complete:   Diabetes:  Completed at today's visit.      Diabetes Follow-up    Patient is checking blood sugars: twice daily.    Blood sugar testing frequency justification: Adjustment of medication(s)  Results are as follows:         am - 100-150 after eating         afternoon - 180-200    Diabetic concerns: other - wants labs drawn     Symptoms of hypoglycemia (low blood sugar): shaky, dizzy     Paresthesias (numbness or burning in feet) or sores: Yes numbness     Date of last diabetic eye exam: long time ago      Amount of exercise or physical activity: 4-5 days/week for an average of 30-45 minutes    Problems taking medications regularly: No    Medication side effects: none    Diet: regular (no restrictions)      Form [facility]  Fax [phone]    Diabetes Follow-up      Patient is checking blood sugars: once     Diabetic concerns: None     Symptoms of hypoglycemia (low blood sugar): none     Paresthesias (numbness or burning in feet) or sores: No     Date of last diabetic eye exam:   Normal     Hyperlipidemia Follow-Up      Rate your low fat/cholesterol diet?: good    Taking statin?  Yes, no muscle aches from statin    Other lipid medications/supplements?:  none    Hypertension Follow-up      Outpatient blood pressures are not being checked.    Low Salt Diet: no added salt      Amount of exercise or physical activity: 6-7 days/week for an average of 30-45 minutes    Problems taking medications regularly: No    Medication side effects: none    Diet: low salt, low fat/cholesterol and diabetic          .  Current Outpatient Prescriptions   Medication Sig Dispense Refill     metoprolol (TOPROL-XL) 50 MG 24 hr tablet Take 1 tablet (50 mg) by mouth At Bedtime (Patient taking differently: Take 25 mg by mouth At Bedtime ) 90 tablet 3     polyvinyl alcohol (ARTIFICIAL TEARS) 1.4 % ophthalmic solution Place 1 drop into both eyes as needed for dry eyes 15 mL [PRN     albuterol (PROAIR HFA/PROVENTIL HFA/VENTOLIN HFA) 108 (90 BASE) MCG/ACT Inhaler Inhale 2 puffs into the lungs every 6 hours as needed for shortness of breath / dyspnea or wheezing 1 Inhaler 1     omeprazole (PRILOSEC) 20 MG CR capsule TAKE ONE CAPSULE BY MOUTH ONCE DAILY EVERY MORNING ONE-HALF HOUR BEFORE A MEAL 90 capsule 3     acetaminophen (Q-PAP EXTRA STRENGTH) 500 MG tablet TAKE 1 TABLET BY MOUTH EVERY 6 HOURS AS NEEDED 100 tablet 5     vitamin D (ERGOCALCIFEROL) 45668 UNIT capsule TAKE 1 CAPSULE BY MOUTH EVERY WEEK 4 capsule 5     blood glucose monitoring (NO BRAND SPECIFIED) meter device kit Use to test blood sugar  Twice daily  times daily or as directed. 1 kit 0     blood glucose (NO BRAND SPECIFIED) lancets standard Use to test blood sugar  dialy times daily or as directed. 1 Box 0     blood glucose monitoring (NO BRAND SPECIFIED) test strip Use to test blood sugars  Daily  times daily or as directed 100 strip 0     blood glucose calibration (NO BRAND SPECIFIED) solution Use to calibrate blood glucose monitor as directed. 1 each 11     tamsulosin (FLOMAX) 0.4 MG 24 hr capsule TAKE 1 CAPSULE BY MOUTH EVERY DAY 90 capsule 3     finasteride (PROSCAR) 5 MG tablet Take 1 tablet (5 mg) by mouth daily 90 tablet 3     polyethylene glycol 0.4%- propylene glycol 0.3% (SYSTANE) 0.4-0.3 % SOLN ophthalmic solution Place 1 drop into both eyes 4 times daily as needed for dry eyes 1 Bottle 11     atorvastatin (LIPITOR) 10 MG tablet Take 1 tablet (10 mg) by mouth daily 90 tablet 3     neomycin-polymyxin-hydrocortisone (CORTISPORIN) 3.5-56025-4 otic suspension  Place 4 drops in ear(s) 4 times daily 10 mL 0     lidocaine (XYLOCAINE) 5 % ointment One application 4 x daily to affected areas lower back and knees if necessary 142 g 11     triamcinolone (KENALOG) 0.1 % cream Apply sparingly to affected area three times daily as needed (Patient not taking: Reported on 2017) 80 g 0     aspirin 81 MG tablet Take 1 tablet (81 mg) by mouth daily Correct ASA (Patient not taking: Reported on 2017) 30 tablet 11     carbidopa-levodopa (SINEMET)  MG per tablet Take 0.5 tablets by mouth 3 times daily (Patient not taking: Reported on 2017) 135 tablet 1     aspirin-acetaminophen-caffeine (EXCEDRIN MIGRAINE) 250-250-65 MG per tablet Take 1 tablet by mouth every 6 hours as needed for headaches (Patient not taking: Reported on 2017) 40 tablet 3     gabapentin (NEURONTIN) 100 MG capsule Take 1 capsule (100 mg) by mouth 2 times daily (Patient not taking: Reported on 2017) 180 capsule 3     aspirin-acetaminophen-caffeine (EXCEDRIN MIGRAINE) 250-250-65 MG per tablet Take 1 tablet by mouth every 6 hours as needed for headaches (Patient not taking: Reported on 2017) 80 tablet 1     ACE/ARB NOT PRESCRIBED, INTENTIONAL, ACE & ARB not prescribed due to Intolerance and Other: hypokalemia x 2 episodes (Patient not taking: Reported on 2017)          No Known Allergies    Immunization History   Administered Date(s) Administered     Influenza (High Dose) 3 valent vaccine 10/18/2013, 09/15/2015, 10/18/2016     Influenza (IIV3) 2007, 10/21/2008, 10/11/2014     Pneumococcal (PCV 13) 2017     Pneumococcal 23 valent 2007, 10/11/2014     Zoster vaccine, live 10/11/2014         reports that he does not drink alcohol.      reports that he does not use illicit drugs.    family history includes Unknown/Adopted in his father and mother.    indicated that his mother is . He indicated that his father is .      has a past surgical history that  includes SURGICAL PATHOLOGY (7/1/2010); Eye surgery (2003); and shoulder surgery.     reports that he does not engage in sexual activity.  .  Pediatric History   Patient Guardian Status     Not on file.     Other Topics Concern     Parent/Sibling W/ Cabg, Mi Or Angioplasty Before 65f 55m? No     unknown     Social History Narrative         reports that he has never smoked. He has never used smokeless tobacco.    Medical, social, surgical, and family histories reviewed.      Labs reviewed in EPIC  BP Readings from Last 3 Encounters:   07/18/17 112/62   01/09/17 118/62   12/12/16 118/64    Wt Readings from Last 3 Encounters:   07/18/17 153 lb (69.4 kg)   01/09/17 168 lb 6.4 oz (76.4 kg)   12/12/16 170 lb 6.4 oz (77.3 kg)                  Patient Active Problem List   Diagnosis     Health Care Home     Dysuria     Essential and other specified forms of tremor     Abnormal involuntary movement     Lipoma     Cervical spondylosis without myelopathy     Degeneration of cervical intervertebral disc     Vitamin D deficiency     Memory loss     Obesity     Insomnia     Gastroesophageal reflux disease without esophagitis     Hyperlipidemia LDL goal <100     Pain in joint involving lower leg     Hypertension goal BP (blood pressure) < 140/80     Type 2 diabetes mellitus without complications (H)     Lumbago     Chronic pain of both knees     Headache     Nausea     UTI (urinary tract infection)     Lung nodule seen on imaging study     Bladder stones     ACP (advance care planning)     Hyponatremia     Tension headache     Past Surgical History:   Procedure Laterality Date     CL AFF SURGICAL PATHOLOGY  7/1/2010     EYE SURGERY  2003    retinal detatch     SHOULDER SURGERY         Social History   Substance Use Topics     Smoking status: Never Smoker     Smokeless tobacco: Never Used     Alcohol use No     Family History   Problem Relation Age of Onset     Unknown/Adopted Mother      Unknown/Adopted Father          Current  Outpatient Prescriptions   Medication Sig Dispense Refill     metoprolol (TOPROL-XL) 50 MG 24 hr tablet Take 1 tablet (50 mg) by mouth At Bedtime (Patient taking differently: Take 25 mg by mouth At Bedtime ) 90 tablet 3     polyvinyl alcohol (ARTIFICIAL TEARS) 1.4 % ophthalmic solution Place 1 drop into both eyes as needed for dry eyes 15 mL [PRN     albuterol (PROAIR HFA/PROVENTIL HFA/VENTOLIN HFA) 108 (90 BASE) MCG/ACT Inhaler Inhale 2 puffs into the lungs every 6 hours as needed for shortness of breath / dyspnea or wheezing 1 Inhaler 1     omeprazole (PRILOSEC) 20 MG CR capsule TAKE ONE CAPSULE BY MOUTH ONCE DAILY EVERY MORNING ONE-HALF HOUR BEFORE A MEAL 90 capsule 3     acetaminophen (Q-PAP EXTRA STRENGTH) 500 MG tablet TAKE 1 TABLET BY MOUTH EVERY 6 HOURS AS NEEDED 100 tablet 5     vitamin D (ERGOCALCIFEROL) 81569 UNIT capsule TAKE 1 CAPSULE BY MOUTH EVERY WEEK 4 capsule 5     blood glucose monitoring (NO BRAND SPECIFIED) meter device kit Use to test blood sugar  Twice daily  times daily or as directed. 1 kit 0     blood glucose (NO BRAND SPECIFIED) lancets standard Use to test blood sugar  dialy times daily or as directed. 1 Box 0     blood glucose monitoring (NO BRAND SPECIFIED) test strip Use to test blood sugars  Daily  times daily or as directed 100 strip 0     blood glucose calibration (NO BRAND SPECIFIED) solution Use to calibrate blood glucose monitor as directed. 1 each 11     tamsulosin (FLOMAX) 0.4 MG 24 hr capsule TAKE 1 CAPSULE BY MOUTH EVERY DAY 90 capsule 3     finasteride (PROSCAR) 5 MG tablet Take 1 tablet (5 mg) by mouth daily 90 tablet 3     polyethylene glycol 0.4%- propylene glycol 0.3% (SYSTANE) 0.4-0.3 % SOLN ophthalmic solution Place 1 drop into both eyes 4 times daily as needed for dry eyes 1 Bottle 11     atorvastatin (LIPITOR) 10 MG tablet Take 1 tablet (10 mg) by mouth daily 90 tablet 3     neomycin-polymyxin-hydrocortisone (CORTISPORIN) 3.5-02824-5 otic suspension Place 4 drops in  ear(s) 4 times daily 10 mL 0     lidocaine (XYLOCAINE) 5 % ointment One application 4 x daily to affected areas lower back and knees if necessary 142 g 11     triamcinolone (KENALOG) 0.1 % cream Apply sparingly to affected area three times daily as needed (Patient not taking: Reported on 7/18/2017) 80 g 0     aspirin 81 MG tablet Take 1 tablet (81 mg) by mouth daily Correct ASA (Patient not taking: Reported on 7/18/2017) 30 tablet 11     carbidopa-levodopa (SINEMET)  MG per tablet Take 0.5 tablets by mouth 3 times daily (Patient not taking: Reported on 7/18/2017) 135 tablet 1     aspirin-acetaminophen-caffeine (EXCEDRIN MIGRAINE) 250-250-65 MG per tablet Take 1 tablet by mouth every 6 hours as needed for headaches (Patient not taking: Reported on 7/18/2017) 40 tablet 3     gabapentin (NEURONTIN) 100 MG capsule Take 1 capsule (100 mg) by mouth 2 times daily (Patient not taking: Reported on 7/18/2017) 180 capsule 3     aspirin-acetaminophen-caffeine (EXCEDRIN MIGRAINE) 250-250-65 MG per tablet Take 1 tablet by mouth every 6 hours as needed for headaches (Patient not taking: Reported on 7/18/2017) 80 tablet 1     ACE/ARB NOT PRESCRIBED, INTENTIONAL, ACE & ARB not prescribed due to Intolerance and Other: hypokalemia x 2 episodes (Patient not taking: Reported on 7/18/2017)       No Known Allergies  Recent Labs   Lab Test  07/18/17   1049  08/22/16   1427   06/16/15   1045  05/06/15   0727   05/05/15   0818   A1C  5.4  5.7   --   5.7   --    --    --    LDL  128*  147*   --    --   61   --    --    HDL  55  52   --    --   61   --    --    TRIG  123  144   --    --   75   --    --    ALT  22  19   --   11  26   --   26   CR  0.95  0.99   < >  0.90  0.81   < >  0.86   GFRESTIMATED  76  73   < >  82  >90  Non  GFR Calc     < >  87   GFRESTBLACK  >90   GFR Calc    88   < >  >90  >90  African American GFR Calc     < >  >90   GFR Calc     POTASSIUM  4.3  4.8   < >  4.3  3.6    < >  3.6   TSH  0.89   --    --    --    --    --   0.56    < > = values in this interval not displayed.        BP Readings from Last 6 Encounters:   07/18/17 112/62   01/09/17 118/62   12/12/16 118/64   10/18/16 122/64   08/22/16 124/62   03/21/16 124/62       Wt Readings from Last 3 Encounters:   07/18/17 153 lb (69.4 kg)   01/09/17 168 lb 6.4 oz (76.4 kg)   12/12/16 170 lb 6.4 oz (77.3 kg)         Positive symptoms or findings indicated by bold designation:     ROS: 10 point ROS neg other than the symptoms noted above in the HPI.except  has Health Care Home; Dysuria; Essential and other specified forms of tremor; Abnormal involuntary movement; Lipoma; Cervical spondylosis without myelopathy; Degeneration of cervical intervertebral disc; Vitamin D deficiency; Memory loss; Obesity; Insomnia; Gastroesophageal reflux disease without esophagitis; Hyperlipidemia LDL goal <100; Pain in joint involving lower leg; Hypertension goal BP (blood pressure) < 140/80; Type 2 diabetes mellitus without complications (H); Lumbago; Chronic pain of both knees; Headache; Nausea; UTI (urinary tract infection); Lung nodule seen on imaging study; Bladder stones; ACP (advance care planning); Hyponatremia; and Tension headache on his problem list.   Constitutional: The patient denied fatigue, fever, insomnia, night sweats, recent illness and weight loss.  Weight loss 5 pound    Eyes: The patient denied blindness, eye pain, eye tearing, photophobia, vision change and visual disturbance. Glasses       Ears/Nose/Throat/Neck: The patient denied dizziness, facial pain, hearing loss, nasal discharge, oral pain, otalgia, postnasal drip, sinus congestion, sore throat, tinnitus and voice change.   Normal hearing and balance     Cardiovascular: The patient denied arrhythmia, chest pain/pressure, claudication, edema, exercise intolerance, fatigue, orthopnea, palpitations and syncope.      Respiratory: The patient denied asthma, chest congestion,  "cough, dyspnea on exertion, dyspnea/shortness of breath, hemoptysis, pedal edema, pleuritic pain, productive sputum, snoring and wheezing.     Gastrointestinal: The patient denied abdominal pain, anorexia, constipation, diarrhea, dysphagia, gastroesophageal reflux, hematochezia, hemorrhoids, melena, nausea and vomiting . GASTROESOPHAGEAL REFLUX DISEASE WITHOUT ESOPHAGITIS CONTROLLED     Genitourinary/Nephrology: The patient denied breast complaint, dysuria, nocturia sexual dysfunction, t, urinary frequency, urinary incontinence, urinary urgency    BENIGN PROSTATIC HYPERTROPHY SYMPTOMS    PSA     Musculoskeletal: The patient denied arthralgia(s), back pain, joint complaint, muscle weakness, myalgias, osteoporosis, sciatica, stiffness and swelling.  NORMAL     Dermatoligic:: The patient denied acne, dermatitis, ecchymosis, itching, mole change, rash, skin cancer, skin lesion and sores.  NORMAL     Neurologic: The patient denied dizziness, gait abnormality, headache, memory loss, mental status change, paresis, paresthesia, seizure, syncope, tremor and vision change. NORMAL     Psychiatric: The patient denied anxiety, depression, disturbances of memory, drug abuse, insomnia, mood swings and relationship difficulties.  NORMAL       Endocrine: The patient denied , goiter, obesity, polyuria and thyroid disease.  DIABETES 2 GOAL 8%   LDL OR \"BAD\" CHOLESTEROL  GOAL < 100   HYPERTENSION WITH GOAL OF LESS THAN 140/80     Hematologic/Lymphatic: The patient denied abnormal bleeding and bruising, abnormal ecchymoses, anemia, lymph node enlargement/mass, petechiae and venous  Thrombosis.      Allergy/Immunology: The patient denied food allergy and  Allergic rhinitis or conjunctivitis.        PE:  /62  Pulse 73  Temp 96.9  F (36.1  C) (Tympanic)  Resp 16  Wt 153 lb (69.4 kg)  SpO2 98%  BMI 26.26 kg/m2 Body mass index is 26.26 kg/(m^2).    Constitutional: general appearance, well nourished, well developed, in no acute " distress, well developed, appears stated age, normal body habitus,      Eyes:; The patient has normal eyelids sclerae and conjunctivae :      Ears/Nose/Throat: external ear, overall: normal appearance; external nose, overall: benign appearance, normal moujth gums and lips  The patient has:      Neck: thyroid, overall: normal size, normal consistency, nontender,  NORMAL      Respiratory:  palpation of chest, overall: normal excursion,  NORMAL     Clear to percussion and auscultation  NORMAL      Tachypnea  NORMAL   Color  NORMAL     Cardiovascular:  Good color with no peripheral edema  NORMAL   Regular sinus rhythm without murmur. Physiologic heart sounds Heart is unelarged  .   Chest/Breast: normal shape  NORMAL       Abdominal exam,  Liver and spleen are  unenlarged  NORMAL        Tenderness  NORMAL    Scars  NORMAL      Urogenital; no renal, flank or bladder  tenderness;  NORMAL      Lymphatic: neck nodes,  NORMAL     Other nodes NORMAL     Musculoskeletal:  Brief ortho exam normal except:  OSTEOARTHRITIS KNEES AND LOWER BACK PAIN      Integument: inspection of skin, no rash, lesions; and, palpation, no induration, no tenderness.  NNL      Neurologic mental status, overall: alert and oriented; gait, no ataxia, no unsteadiness; coordination, no tremors; cranial nerves, overall: normal motor, overall: normal bulk, tone.  NLB    Psychiatric: orientation/consciousness, overall: oriented to person, place and time; behavior/psychomotor activity, no tics, normal psychomotor activity; mood and affect, overall: normal mood and affect; appearance, overall: well-groomed, good eye contact; speech, overall: normal quality, no aphasia and normal quality, quantity, intact.  NORMAL      Diagnostic Test Results:  Results for orders placed or performed in visit on 07/18/17   Basic metabolic panel   Result Value Ref Range    Sodium 140 133 - 144 mmol/L    Potassium 4.3 3.4 - 5.3 mmol/L    Chloride 105 94 - 109 mmol/L    Carbon  Dioxide 31 20 - 32 mmol/L    Anion Gap 4 3 - 14 mmol/L    Glucose 68 (L) 70 - 99 mg/dL    Urea Nitrogen 12 7 - 30 mg/dL    Creatinine 0.95 0.66 - 1.25 mg/dL    GFR Estimate 76 >60 mL/min/1.7m2    GFR Estimate If Black >90   GFR Calc   >60 mL/min/1.7m2    Calcium 9.4 8.5 - 10.1 mg/dL   Lipid panel reflex to direct LDL   Result Value Ref Range    Cholesterol 208 (H) <200 mg/dL    Triglycerides 123 <150 mg/dL    HDL Cholesterol 55 >39 mg/dL    LDL Cholesterol Calculated 128 (H) <100 mg/dL    Non HDL Cholesterol 153 (H) <130 mg/dL   Albumin Random Urine Quantitative   Result Value Ref Range    Creatinine Urine 68 mg/dL    Albumin Urine mg/L 10 mg/L    Albumin Urine mg/g Cr 15.11 0 - 17 mg/g Cr   Hemoglobin A1c   Result Value Ref Range    Hemoglobin A1C 5.4 4.3 - 6.0 %   ALT   Result Value Ref Range    ALT 22 0 - 70 U/L   Vitamin D Deficiency   Result Value Ref Range    Vitamin D Deficiency screening 19 (L) 20 - 75 ug/L   TSH with free T4 reflex   Result Value Ref Range    TSH 0.89 0.40 - 4.00 mU/L   Prostate spec antigen screen   Result Value Ref Range    PSA 3.35 0 - 4 ug/L         ICD-10-CM    1. Routine history and physical examination of adult Z00.00    2. Type 2 diabetes mellitus without complication, without long-term current use of insulin (H) E11.9 Albumin Random Urine Quantitative     Hemoglobin A1c     TSH with free T4 reflex     OPHTHALMOLOGY ADULT REFERRAL   3. Need for pneumococcal vaccine Z23 Pneumococcal vaccine 13 valent PCV13 IM (Prevnar) [[id number]]     ADMIN: Vaccine, Initial ([id number])     ADMIN MEDICARE: Pneumococcal Vaccine ()   4. Hyperlipidemia LDL goal <100 E78.5 Basic metabolic panel     Lipid panel reflex to direct LDL     ALT     OPHTHALMOLOGY ADULT REFERRAL   5. Hypertension goal BP (blood pressure) < 140/80 I10 Basic metabolic panel     OPHTHALMOLOGY ADULT REFERRAL   6. Gastroesophageal reflux disease without esophagitis K21.9    7. Chronic low back pain with sciatica,  sciatica laterality unspecified, unspecified back pain laterality M54.40     G89.29    8. Chronic pain of both knees M25.561     M25.562     G89.29    9. Vitamin D deficiency E55.9 Vitamin D Deficiency   10. Special screening for malignant neoplasm of prostate Z12.5 Prostate spec antigen screen   11. Screening examination for pulmonary tuberculosis Z11.1 XR Chest 1 View        .    Side effects benefits and risks thoroughly discussed. .he may come in early if unimproved or getting worse          Importance of adhering to regimen discussed and if medications were dispensed, the importance of taking medications discussed and bringing in the medications after every visit for chronic problems         Please drink 2 glasses of water prior to meals and walk 15-30 minutes after meals    I spent 45 MINUTES SPENT  with patient discussing the following issues   The primary encounter diagnosis was Routine history and physical examination of adult. Diagnoses of Type 2 diabetes mellitus without complication, without long-term current use of insulin (H), Need for pneumococcal vaccine, Hyperlipidemia LDL goal <100, Hypertension goal BP (blood pressure) < 140/80, Gastroesophageal reflux disease without esophagitis, Chronic low back pain with sciatica, sciatica laterality unspecified, unspecified back pain laterality, Chronic pain of both knees, Vitamin D deficiency, Special screening for malignant neoplasm of prostate, and Screening examination for pulmonary tuberculosis were also pertinent to this visit. over half of which involved counseling and coordination of care.    Patient Instructions   (Z00.00) Routine history and physical examination of adult  (primary encounter diagnosis)  Comment:    Plan:      (E11.9) Type 2 diabetes mellitus without complication, without long-term current use of insulin (H)  Comment:    Plan: Albumin Random Urine Quantitative, Hemoglobin         A1c, TSH with free T4 reflex, OPHTHALMOLOGY          "ADULT REFERRAL             (Z23) Need for pneumococcal vaccine  Comment:    Plan: Pneumococcal vaccine 13 valent PCV13 IM         (Prevnar) [[id number]], ADMIN: Vaccine, Initial         ([id number]), ADMIN MEDICARE: Pneumococcal Vaccine         ()             (E78.5) Hyperlipidemia LDL goal <100  Comment:    Plan: Basic metabolic panel, Lipid panel reflex to         direct LDL, ALT, OPHTHALMOLOGY ADULT REFERRAL             (I10) Hypertension goal BP (blood pressure) < 140/80  Comment:    Plan: Basic metabolic panel, OPHTHALMOLOGY ADULT         REFERRAL             (K21.9) Gastroesophageal reflux disease without esophagitis  Comment:    Plan:      (M54.40,  G89.29) Chronic low back pain with sciatica, sciatica laterality unspecified, unspecified back pain laterality  Comment:    Plan:      (M25.561,  M25.562,  G89.29) Chronic pain of both knees  Comment:    Plan:      (E55.9) Vitamin D deficiency  Comment:    Plan: Vitamin D Deficiency             (Z12.5) Special screening for malignant neoplasm of prostate  Comment:    Plan: Prostate spec antigen screen             (Z11.1) Screening examination for pulmonary tuberculosis  Comment:    Plan: XR Chest 1 View  .Diabetes: Exchange List  What are the exchange lists?   The exchange lists show you portions of food that equal 1 exchange. Foods are divided into food lists. The foods on each list are called exchanges because they have a similar number of calories, protein, carbohydrate and fat content. Foods from each list can be traded or \"exchanged\" for any other food on the same list because they all have a similar exchange value. A dietitian will help you plan how much food your child should eat at each meal and from what lists the foods should come from. At first you should measure your food until you are able to make good estimates about serving sizes. The following list is a sample of foods found on the exchange lists. For more information, you can buy the Exchange Lists for " Meal Planning from: The [employer] [address] [place] [address] 1-305.741.5415 http://www.diabetes.org  Carbohydrate group   Starch List: One starch exchange contains about 15 grams of carbohydrate, 3 grams of protein, 0 to 1 grams of fat, and 80 calories. A starch exchange is sometimes called a carb exchange. Examples of one starch (carb) exchange are:   one slice of bread   1/2 hamburger or hot dog bun   3/4 cup of unsweetened cereal   1/3 cup pasta   3 cups popcorn   crackers (6 small saltines, 3 squares of haley crackers, 3 of most other crackers)   1 pancake or waffle (5 inch)   15 to 20 fat-free or baked potato or corn chips.   The vegetables included in the starch exchanges include:   corn (1/2 cup or 1/2 cob)   white potato (1/4 large baked with skin or 1/2 cup mashed)   yam or sweet potato (1/2 cup)   green peas (1/2 cup)   squash, winter (1 cup)   lima beans (2/3 cup).   Fruit List: 1 fruit exchange contains about 15 grams of carbohydrate and 60 calories. Examples of one fruit exchange are:   grape juice (1/3 cup)   apple or pineapple juice (1/2 cup)   orange or grapefruit juice (1/2 cup)   1 small apple   orange or peach   1/2 banana   1 cup raspberries   1/3 of a small cantaloupe   1 slice of watermelon.   Milk List: 1 milk exchange contains about 8 grams of protein and 12 grams of carbohydrate. Items on the milk list are divided into fat-free, reduced fat, and whole milk depending on the number of fat grams in the exchange. Examples of one milk exchange are: Fat-Free (0 to 3 grams of fat)   1 cup of skim or non-fat milk   1 cup of 1% milk (also includes 1/2 fat exchange)   6 ounces flavored fat-free yogurt   Reduced-Fat (5 grams of fat)   6 ounces of plain, low-fat yogurt   1 cup 2% milk (also includes one fat exchange).   Whole Milk (8 grams of fat)   8 ounces of plain yogurt (made from whole milk)   1 cup whole milk.   Vegetable List: One vegetable exchange has 5 grams of  "carbohydrate, 2 grams of protein, no fat, and 25 calories. One-half cup of cooked or a cup of raw vegetables is a good measure for 1 exchange of most vegetables. Raw lettuce may be taken in larger quantities, but salad dressing usually equals 1 fat exchange. Other Carbohydrates List: One \"other carbohydrate\" exchange has 15 grams of carbohydrate. Many of these foods count as a starch exchange and one or more fat exchanges.   brownie (2 inch square) = 1 carb, 1 fat exchange   fruit snack roll = 1 carb exchange   granola bar = 1 and 1/2 carb exchanges   ice cream (1/2 cup) = 1 carb, 2 fat exchanges   frozen yogurt (1/2 cup) = 1 carb, 0 to 1 fat exchanges   tortilla chips (6-12) = 1 carb, 2 fat exchanges.   Meat and Meat Substitute Group   Meats are divided into very lean meats, lean meats, medium-fat meats, and high-fat meats. People with diabetes should try to eat more lean and medium fat meats and stay away from the high fat choices. The Very Lean meat group includes foods that contain 7 grams of protein, 0 to 1 gram of fat, and 35 calories for 1 exchange. Examples include:   1 ounce chicken or turkey (white meat, no skin)   1 ounce fresh fish   1 ounce fat-free cheese   2 egg whites   The Lean meat group includes foods that contain 7 grams of protein, 3 grams of fat, and 55 calories for 1 meat exchange. Examples include:   1 ounce chicken or turkey (dark meat, no skin)   1 ounce fish   1 ounce lean pork   1 ounce USDA Select or Choice grades of lean beef   1 ounce cheese (with 3 grams of fat or less per ounce).   The Medium-Fat group includes foods that have 7 grams of protein, 5 grams of fat, and 75 calories for 1 meat exchange. Examples include:   1 ounce of ground beef, most cuts of beef, pork, lamb or veal   1 ounce of cheese (5 grams of fat per ounce or less)   1 egg   1 ounce fried fish.   The High-Fat foods have 7 grams of protein, 8 grams of fat, and 100 calories for 1 meat exchange. This group includes: "   1 ounce of pork sausage   1 ounce of spare ribs   1 oz of regular cheese (American, Swiss etc.)   1 oz of lunch meat   1 hot dog (turkey or chicken).   Fat Group   Fat List: Fat is necessary for the body and is particularly important during periods of fasting (overnight), when it is very slowly absorbed. 1 fat exchange contains 5 grams of fat and 45 calories. The monounsaturated fats and polyunsaturated fats are better for us than saturated fats. The fat list includes: 1 exchange of monounsaturated fats equals:   1/2 Tbsp peanut butter   6 almonds   1 tsp of oil (olive, peanut, canola).   1 exchange of polyunsaturated fats equals:   1 tsp margarine   1 tsp of any vegetable oil (except coconut).   1 exchange of saturated fat includes:   1 tsp butter   1 strip of cho   2 Tbsp of cream (half and half).   Free Foods   A free food contains less than 20 calories or less than 5 grams of carbohydrate per serving. If the food has a serving size listed on its package, it should be limited to 3 servings spread throughout the day. Examples of free foods include:   4 Tbsp fat-free margarine   1 Tbsp fat-free Miracle Whip   sugar-free gelatin   diet soft drinks   catsup   soy sauce   spices.   Combination Foods   Many foods, such as casseroles, are mixed together. Your dietitian can help you figure out how many exchanges to count for combination foods. For example:   lasagna (1 cup) = 2 carb exchanges and 2 medium-fat meat exchanges   spaghetti with meatballs (1 cup) = 2 carb exchanges and 2 medium-fat meat exchanges   pizza, cheese (1/4 of 12 in.) = 2 carbs, 2 medium-fat meats   chicken noodle soup (1 cup) = 1 carb exchange   frozen entrée (less than 300 calories) = 2 carbs, 3 lean meat exchanges   macaroni and cheese (1 cup) = 2 carb exchanges and 2 medium-fat meat exchanges.   1. MODIFIED FAST 250 CALORIES TWICE DAILY FEMALES  300 CALORIES TWICE DAILY FOR MALES   OATMEAL AND COTTAGE CHEESE WORK NICELY   COPIOUS WATER  BETWEEN   5/2 PLAN DR [facility]ANNING [facility]  NORMAL DIET 5 DAYS PER WEEK  SEMIFAST 2 DAYS PER WEEK  LOOK UP 5-2 PLAN ON THE INTERNET    Weight Loss Tips  1. Do not eat after 6 hrs before your expected bedtime  2. Have your heaviest meal for breakfast, a slightly lighter meal at lunch and a snack 6 hrs before bed  3. No sugar/calorie drinks except milk ie no fruit juice, pop, alcohol.  4. Drink milk OR WATER  30min before meals to decrease your hunger. Also it is excellent as part of your last meal of the day snack  5. Drink lots of water  6. Increase fiber in diet: all bran cereal, salads, popcorn etc  7. Have only one small serving of fruit a day about 1/2 cup (as this is high in sugar)  8. EXERCISE is the bottom line. Without it, you will gain weight even on a low calorie diet. Best if done 2-3X a day as can    2. The only way known to prevent diabetes or keep it from getting worse is exercise, 20-40 minutes 3 times a day around the time of meals      SLEEP 8 HOURS PER DAY    BLACK AND BLUEBERRIES EVERY DAY ANTINFLAMMATORY BENEFIT     PLAIN YOGURT SEVERAL TIMES DAILY AS TOLERATED IF NOT ALLERGIC     AVOID HIGH FRUCTOSE SYRUP AND OLENE IN YOUR DIET     GREEN TEA EXTRACT    PROBIOTIC CAPSULE DAILY  HIGH QUALITY     DON'T EAT LATE AT NIGHT    CARALLUMA FIMBRIATA HELPS WITH APPETITE    KEEP A BLESSINGS JOURNAL    888 BREATHER WHEN STRESSED OR COUNT BACK FROM 100 WITH SLOW BREATHING    POSITIVE AFFIRMATIONS         FIT BIT OR PEDOMETER 10,000 STEPS PER DAY     FIT LORENA SOTO RECOMMENDED                                 ALL THE ABOVE PROBLEMS ARE STABLE AND MED CHANGES AS NOTED    Diet:  MEDITERRANEAN DIET AND DIABETES     Exercise:  AEROBIC    Exercises Range of motion, balance, isometric, and strengthening exercises 30 repetitions twice daily of involved joints      .AMY BURNS MD 7/18/2017 10:47 AM  July 18, 2017

## 2017-07-18 NOTE — PATIENT INSTRUCTIONS
"(Z00.00) Routine history and physical examination of adult  (primary encounter diagnosis)  Comment:    Plan:      (E11.9) Type 2 diabetes mellitus without complication, without long-term current use of insulin (H)  Comment:    Plan: Albumin Random Urine Quantitative, Hemoglobin         A1c, TSH with free T4 reflex, OPHTHALMOLOGY         ADULT REFERRAL             (Z23) Need for pneumococcal vaccine  Comment:    Plan: Pneumococcal vaccine 13 valent PCV13 IM         (Prevnar) [66800], ADMIN: Vaccine, Initial         (97116), ADMIN MEDICARE: Pneumococcal Vaccine         ()             (E78.5) Hyperlipidemia LDL goal <100  Comment:    Plan: Basic metabolic panel, Lipid panel reflex to         direct LDL, ALT, OPHTHALMOLOGY ADULT REFERRAL             (I10) Hypertension goal BP (blood pressure) < 140/80  Comment:    Plan: Basic metabolic panel, OPHTHALMOLOGY ADULT         REFERRAL             (K21.9) Gastroesophageal reflux disease without esophagitis  Comment:    Plan:      (M54.40,  G89.29) Chronic low back pain with sciatica, sciatica laterality unspecified, unspecified back pain laterality  Comment:    Plan:      (M25.561,  M25.562,  G89.29) Chronic pain of both knees  Comment:    Plan:      (E55.9) Vitamin D deficiency  Comment:    Plan: Vitamin D Deficiency             (Z12.5) Special screening for malignant neoplasm of prostate  Comment:    Plan: Prostate spec antigen screen             (Z11.1) Screening examination for pulmonary tuberculosis  Comment:    Plan: XR Chest 1 View  .Diabetes: Exchange List  What are the exchange lists?   The exchange lists show you portions of food that equal 1 exchange. Foods are divided into food lists. The foods on each list are called exchanges because they have a similar number of calories, protein, carbohydrate and fat content. Foods from each list can be traded or \"exchanged\" for any other food on the same list because they all have a similar exchange value. A dietitian " will help you plan how much food your child should eat at each meal and from what lists the foods should come from. At first you should measure your food until you are able to make good estimates about serving sizes. The following list is a sample of foods found on the exchange lists. For more information, you can buy the Exchange Lists for Meal Planning from: The American Diabetes Association [address] [place] [id number] [phone] http://www.diabetes.org  Carbohydrate group   Starch List: One starch exchange contains about 15 grams of carbohydrate, 3 grams of protein, 0 to 1 grams of fat, and 80 calories. A starch exchange is sometimes called a carb exchange. Examples of one starch (carb) exchange are:   one slice of bread   1/2 hamburger or hot dog bun   3/4 cup of unsweetened cereal   1/3 cup pasta   3 cups popcorn   crackers (6 small saltines, 3 squares of haley crackers, 3 of most other crackers)   1 pancake or waffle (5 inch)   15 to 20 fat-free or baked potato or corn chips.   The vegetables included in the starch exchanges include:   corn (1/2 cup or 1/2 cob)   white potato (1/4 large baked with skin or 1/2 cup mashed)   yam or sweet potato (1/2 cup)   green peas (1/2 cup)   squash, winter (1 cup)   lima beans (2/3 cup).   Fruit List: 1 fruit exchange contains about 15 grams of carbohydrate and 60 calories. Examples of one fruit exchange are:   grape juice (1/3 cup)   apple or pineapple juice (1/2 cup)   orange or grapefruit juice (1/2 cup)   1 small apple   orange or peach   1/2 banana   1 cup raspberries   1/3 of a small cantaloupe   1 slice of watermelon.   Milk List: 1 milk exchange contains about 8 grams of protein and 12 grams of carbohydrate. Items on the milk list are divided into fat-free, reduced fat, and whole milk depending on the number of fat grams in the exchange. Examples of one milk exchange are: Fat-Free (0 to 3 grams of fat)   1 cup of skim or non-fat milk   1 cup of 1% milk  "(also includes 1/2 fat exchange)   6 ounces flavored fat-free yogurt   Reduced-Fat (5 grams of fat)   6 ounces of plain, low-fat yogurt   1 cup 2% milk (also includes one fat exchange).   Whole Milk (8 grams of fat)   8 ounces of plain yogurt (made from whole milk)   1 cup whole milk.   Vegetable List: One vegetable exchange has 5 grams of carbohydrate, 2 grams of protein, no fat, and 25 calories. One-half cup of cooked or a cup of raw vegetables is a good measure for 1 exchange of most vegetables. Raw lettuce may be taken in larger quantities, but salad dressing usually equals 1 fat exchange. Other Carbohydrates List: One \"other carbohydrate\" exchange has 15 grams of carbohydrate. Many of these foods count as a starch exchange and one or more fat exchanges.   brownie (2 inch square) = 1 carb, 1 fat exchange   fruit snack roll = 1 carb exchange   granola bar = 1 and 1/2 carb exchanges   ice cream (1/2 cup) = 1 carb, 2 fat exchanges   frozen yogurt (1/2 cup) = 1 carb, 0 to 1 fat exchanges   tortilla chips (6-12) = 1 carb, 2 fat exchanges.   Meat and Meat Substitute Group   Meats are divided into very lean meats, lean meats, medium-fat meats, and high-fat meats. People with diabetes should try to eat more lean and medium fat meats and stay away from the high fat choices. The Very Lean meat group includes foods that contain 7 grams of protein, 0 to 1 gram of fat, and 35 calories for 1 exchange. Examples include:   1 ounce chicken or turkey (white meat, no skin)   1 ounce fresh fish   1 ounce fat-free cheese   2 egg whites   The Lean meat group includes foods that contain 7 grams of protein, 3 grams of fat, and 55 calories for 1 meat exchange. Examples include:   1 ounce chicken or turkey (dark meat, no skin)   1 ounce fish   1 ounce lean pork   1 ounce USDA Select or Choice grades of lean beef   1 ounce cheese (with 3 grams of fat or less per ounce).   The Medium-Fat group includes foods that have 7 grams of protein, 5 " grams of fat, and 75 calories for 1 meat exchange. Examples include:   1 ounce of ground beef, most cuts of beef, pork, lamb or veal   1 ounce of cheese (5 grams of fat per ounce or less)   1 egg   1 ounce fried fish.   The High-Fat foods have 7 grams of protein, 8 grams of fat, and 100 calories for 1 meat exchange. This group includes:   1 ounce of pork sausage   1 ounce of spare ribs   1 oz of regular cheese (American, Swiss etc.)   1 oz of lunch meat   1 hot dog (turkey or chicken).   Fat Group   Fat List: Fat is necessary for the body and is particularly important during periods of fasting (overnight), when it is very slowly absorbed. 1 fat exchange contains 5 grams of fat and 45 calories. The monounsaturated fats and polyunsaturated fats are better for us than saturated fats. The fat list includes: 1 exchange of monounsaturated fats equals:   1/2 Tbsp peanut butter   6 almonds   1 tsp of oil (olive, peanut, canola).   1 exchange of polyunsaturated fats equals:   1 tsp margarine   1 tsp of any vegetable oil (except coconut).   1 exchange of saturated fat includes:   1 tsp butter   1 strip of cho   2 Tbsp of cream (half and half).   Free Foods   A free food contains less than 20 calories or less than 5 grams of carbohydrate per serving. If the food has a serving size listed on its package, it should be limited to 3 servings spread throughout the day. Examples of free foods include:   4 Tbsp fat-free margarine   1 Tbsp fat-free Miracle Whip   sugar-free gelatin   diet soft drinks   catsup   soy sauce   spices.   Combination Foods   Many foods, such as casseroles, are mixed together. Your dietitian can help you figure out how many exchanges to count for combination foods. For example:   lasagna (1 cup) = 2 carb exchanges and 2 medium-fat meat exchanges   spaghetti with meatballs (1 cup) = 2 carb exchanges and 2 medium-fat meat exchanges   pizza, cheese (1/4 of 12 in.) = 2 carbs, 2 medium-fat meats   chicken  noodle soup (1 cup) = 1 carb exchange   frozen entrée (less than 300 calories) = 2 carbs, 3 lean meat exchanges   macaroni and cheese (1 cup) = 2 carb exchanges and 2 medium-fat meat exchanges.   1. MODIFIED FAST 250 CALORIES TWICE DAILY FEMALES  300 CALORIES TWICE DAILY FOR MALES   OATMEAL AND COTTAGE CHEESE WORK NICELY   COPIOUS WATER BETWEEN   5/2 PLAN DR MANNING [facility]  NORMAL DIET 5 DAYS PER WEEK  SEMIFAST 2 DAYS PER WEEK  LOOK UP 5-2 PLAN ON THE INTERNET    Weight Loss Tips  1. Do not eat after 6 hrs before your expected bedtime  2. Have your heaviest meal for breakfast, a slightly lighter meal at lunch and a snack 6 hrs before bed  3. No sugar/calorie drinks except milk ie no fruit juice, pop, alcohol.  4. Drink milk OR WATER  30min before meals to decrease your hunger. Also it is excellent as part of your last meal of the day snack  5. Drink lots of water  6. Increase fiber in diet: all bran cereal, salads, popcorn etc  7. Have only one small serving of fruit a day about 1/2 cup (as this is high in sugar)  8. EXERCISE is the bottom line. Without it, you will gain weight even on a low calorie diet. Best if done 2-3X a day as can    2. The only way known to prevent diabetes or keep it from getting worse is exercise, 20-40 minutes 3 times a day around the time of meals      SLEEP 8 HOURS PER DAY    BLACK AND BLUEBERRIES EVERY DAY ANTINFLAMMATORY BENEFIT     PLAIN YOGURT SEVERAL TIMES DAILY AS TOLERATED IF NOT ALLERGIC     AVOID HIGH FRUCTOSE SYRUP AND OLENE IN YOUR DIET     GREEN TEA EXTRACT    PROBIOTIC CAPSULE DAILY  HIGH QUALITY     DON'T EAT LATE AT NIGHT    TAMIKA HEBERTA HELPS WITH APPETITE    KEEP A BLEAffinity CirclesINGS JOURNAL    888 BREATHER WHEN STRESSED OR COUNT BACK FROM 100 WITH SLOW BREATHING    POSITIVE AFFIRMATIONS         FIT BIT OR PEDOMETER 10,000 STEPS PER DAY     FIT BIT TWICH RECOMMENDED

## 2017-07-19 LAB
ALT SERPL W P-5'-P-CCNC: 22 U/L (ref 0–70)
ANION GAP SERPL CALCULATED.3IONS-SCNC: 4 MMOL/L (ref 3–14)
BUN SERPL-MCNC: 12 MG/DL (ref 7–30)
CALCIUM SERPL-MCNC: 9.4 MG/DL (ref 8.5–10.1)
CHLORIDE SERPL-SCNC: 105 MMOL/L (ref 94–109)
CHOLEST SERPL-MCNC: 208 MG/DL
CO2 SERPL-SCNC: 31 MMOL/L (ref 20–32)
CREAT SERPL-MCNC: 0.95 MG/DL (ref 0.66–1.25)
DEPRECATED CALCIDIOL+CALCIFEROL SERPL-MC: 19 UG/L (ref 20–75)
GFR SERPL CREATININE-BSD FRML MDRD: 76 ML/MIN/1.7M2
GLUCOSE SERPL-MCNC: 68 MG/DL (ref 70–99)
HDLC SERPL-MCNC: 55 MG/DL
LDLC SERPL CALC-MCNC: 128 MG/DL
NONHDLC SERPL-MCNC: 153 MG/DL
POTASSIUM SERPL-SCNC: 4.3 MMOL/L (ref 3.4–5.3)
PSA SERPL-ACNC: 3.35 UG/L (ref 0–4)
SODIUM SERPL-SCNC: 140 MMOL/L (ref 133–144)
TRIGL SERPL-MCNC: 123 MG/DL
TSH SERPL DL<=0.005 MIU/L-ACNC: 0.89 MU/L (ref 0.4–4)

## 2017-07-19 NOTE — PROGRESS NOTES
"Please send normal lab letter when labs are complete  NORMAL PSA OR PROSTATE CANCER SCREENING TEST,     NORMAL DIABETES URINE PROTEIN TEST   BORDERLINE  LOW VITAMIN D LEVEL   CONTINUE VITAMIN D REPLACEMENT    BORDERLINE  TOTAL CHOLESTEROL   NORMAL TRIGLYCERIDES   BORDERLINE  LDL OR \"BAD\" CHOLESTEROL   BORDERLINE  HDL OR \"GOOD\" CHOLESTEROL   AMY BURNS JR., MD"

## 2017-09-07 ENCOUNTER — CARE COORDINATION (OUTPATIENT)
Dept: GERIATRIC MEDICINE | Facility: CLINIC | Age: 82
End: 2017-09-07

## 2017-09-07 DIAGNOSIS — Z71.89 ACP (ADVANCE CARE PLANNING): Chronic | ICD-10-CM

## 2017-09-07 NOTE — PROGRESS NOTES
Home visit/Giovanni Risk Assessment/EW screening completed on: 09/7/17    Member resides: Apartment handicap accessible. Remains safe in his one bedroom apartment with support services in place. Present at the visit were member and CM. No recent hospitalization or ED visits. Last f/u with PCP on 7/18/17. Continues to receive strong family support.   Member currently receiving the following services: PCA, ADC, and PERS    See EMR for a list of client's diagnoses and medications.     Medication management:   Medications reviewed:Yes.   Medication management: Family sets up medication.   Medication understanding:Caregiver has understanding of regimen and is able to complete med set up/administration Yes. MTM offered.    Member Mood/behavior-PHQ9 score: 0.  Any needs to f/u with PCP on PHQ9 score: N/A     Plan of Care:  Member requested increase in ADC.   Follow-Up Plan: Member informed of future contact, plan to f/u with member with a 6 month telephone assessment.  Contact information shared with member and family, encouraged member to call with any questions or concerns prior to this.    See [facility] for further detailed information    Michelle Melchor RN BSN SOPHIA  [facility]   [phone]  Fax: [phone]

## 2017-09-13 NOTE — ASSESSMENT & PLAN NOTE
Advance Care Planning 9/13/2017: ACP Review of Chart / Resources Provided:  Reviewed chart for advance care plan.  Asha Blantonssuf has been provided information and resources to begin or update their advance care plan.  Added by Michelle Melchor

## 2017-09-20 ENCOUNTER — CARE COORDINATION (OUTPATIENT)
Dept: GERIATRIC MEDICINE | Facility: CLINIC | Age: 82
End: 2017-09-20

## 2017-09-20 NOTE — PROGRESS NOTES
Received after visit chart from care coordinator.  Completed following tasks: Mailed copy of care plan to client  Emailed CPS to [facility] for [facility]  Updated services in access  Entered MMIS  Chart was returned to CC.   [facility]:  Emailed completed PCA assessment to [facility].  Faxed copy of PCA assessment to [facility] Agency and mailed copy to member.  Faxed MD Communication to PCP.     Allyson Henry  Case Management Specialist  [facility]  [phone]

## 2018-01-25 ENCOUNTER — OFFICE VISIT (OUTPATIENT)
Dept: FAMILY MEDICINE | Facility: CLINIC | Age: 83
End: 2018-01-25
Payer: COMMERCIAL

## 2018-01-25 VITALS
SYSTOLIC BLOOD PRESSURE: 118 MMHG | WEIGHT: 153 LBS | OXYGEN SATURATION: 98 % | HEART RATE: 70 BPM | RESPIRATION RATE: 20 BRPM | BODY MASS INDEX: 26.26 KG/M2 | TEMPERATURE: 96.9 F | DIASTOLIC BLOOD PRESSURE: 64 MMHG

## 2018-01-25 DIAGNOSIS — K21.9 GASTROESOPHAGEAL REFLUX DISEASE WITHOUT ESOPHAGITIS: Chronic | ICD-10-CM

## 2018-01-25 DIAGNOSIS — E11.9 TYPE 2 DIABETES MELLITUS WITHOUT COMPLICATION, WITHOUT LONG-TERM CURRENT USE OF INSULIN (H): Primary | ICD-10-CM

## 2018-01-25 DIAGNOSIS — R41.3 MEMORY LOSS: ICD-10-CM

## 2018-01-25 DIAGNOSIS — I10 HYPERTENSION GOAL BP (BLOOD PRESSURE) < 140/80: Chronic | ICD-10-CM

## 2018-01-25 DIAGNOSIS — M54.40 CHRONIC LOW BACK PAIN WITH SCIATICA, SCIATICA LATERALITY UNSPECIFIED, UNSPECIFIED BACK PAIN LATERALITY: Chronic | ICD-10-CM

## 2018-01-25 DIAGNOSIS — E55.9 VITAMIN D DEFICIENCY: ICD-10-CM

## 2018-01-25 DIAGNOSIS — E78.5 HYPERLIPIDEMIA LDL GOAL <100: Chronic | ICD-10-CM

## 2018-01-25 DIAGNOSIS — G89.29 CHRONIC LOW BACK PAIN WITH SCIATICA, SCIATICA LATERALITY UNSPECIFIED, UNSPECIFIED BACK PAIN LATERALITY: Chronic | ICD-10-CM

## 2018-01-25 DIAGNOSIS — J10.1 INFLUENZA DUE TO INFLUENZA VIRUS, TYPE A, HUMAN: ICD-10-CM

## 2018-01-25 PROCEDURE — 99214 OFFICE O/P EST MOD 30 MIN: CPT | Performed by: FAMILY MEDICINE

## 2018-01-25 NOTE — PATIENT INSTRUCTIONS
(E11.9) Type 2 diabetes mellitus without complication, without long-term current use of insulin (H)  (primary encounter diagnosis)  Comment:    Plan: OPHTHALMOLOGY ADULT REFERRAL             (E11.9) Type 2 diabetes mellitus without complication, without long-term current use of insulin (H)  (primary encounter diagnosis)  Comment:    Plan: OPHTHALMOLOGY ADULT REFERRAL             (J10.1) Influenza due to influenza virus, type A, human  Comment:    Plan:      (E78.5) Hyperlipidemia LDL goal <100  Comment:    Plan:      (I10) Hypertension goal BP (blood pressure) < 140/80  Comment:    Plan:      (K21.9) Gastroesophageal reflux disease without esophagitis  Comment:    Plan:      (M54.40,  G89.29) Chronic low back pain with sciatica, sciatica laterality unspecified, unspecified back pain laterality  Comment:    Plan:      (R41.3) Memory loss  Comment:    Plan:      (E55.9) Vitamin D deficiency  Comment:    Plan:

## 2018-01-25 NOTE — NURSING NOTE
"Chief Complaint   Patient presents with     Hospital F/U     ABNW flu       Initial /64  Pulse 70  Temp 96.9  F (36.1  C) (Tympanic)  Resp 20  Wt 153 lb (69.4 kg)  SpO2 98%  BMI 26.26 kg/m2 Estimated body mass index is 26.26 kg/(m^2) as calculated from the following:    Height as of 1/9/17: 5' 4\" (1.626 m).    Weight as of this encounter: 153 lb (69.4 kg).  Medication Reconciliation: complete   Mary Mcfarlane CMA    "

## 2018-01-25 NOTE — PROGRESS NOTES
"  SUBJECTIVE:   Asha Sanford is a 83 year old male who presents to clinic today for the following health issues:          Hospital Follow-up Visit:    Hospital/Nursing Home/ Rehab Facility: [facility]  Date of Admission: 1/12/2018  Date of Discharge: 1/13/2018  Reason(s) for Admission: influenza, hypertension            Problems taking medications regularly:  None       Medication changes since discharge: None       Problems adhering to non-medication therapy:  None    Summary of hospitalization:  [facility] discharge summary reviewed  CareEverywhere information obtained and reviewed  Diagnostic Tests/Treatments reviewed.  Follow up needed:  FOLLOW UP  INFLUENZA  Other Healthcare Providers Involved in Patient s Care:         None  Update since discharge: improved.     Post Discharge Medication Reconciliation: discharge medications reconciled, continue medications without change.  Plan of care communicated with patient     Coding guidelines for this visit:  Type of Medical   Decision Making Face-to-Face Visit       within 7 Days of discharge Face-to-Face Visit        within 14 days of discharge   Moderate Complexity [id number]   High Complexity [id number]          .AMY BURNS MD .1/25/2018 2:53 PM .January 25, 2018      Asha Sanford is a 83 year old male who is who presents with  FOLLOW UP  INFLUENZA A     Onset : ONGOING BUT IMPROVED COUGH AND SHORTNESS OF BREATH      Severity: MILD       Home treatments SUPPORTIVE TYLENOL AND TAMIVIR TWICE DAILY X 5 DAYS      Additional Symptoms:  COUGH. WHEEZING AND FATIGUE AND JOINT ACHES     Course IMPROVED     DYSURIA     TREMOR     NECK DISC DISEASE IMPROVED    VITAMIN D REPLACEMENT      MEMORY LOSS MILD INTERMITTENT    OBESITY IMPROVED    GASTROESOPHAGEAL REFLUX DISEASE WITHOUT ESOPHAGITIS     LDL OR \"BAD\" CHOLESTEROL  GOAL < 100     HYPERTENSION WITH GOAL OF LESS THAN 140/80 WELL CONTROLLED    CHRONIC LOWER BACK PAIN AND OSTEOARTHRITIS KNEES "     HISTORY OF BLADDER STONES AND HYPONATREMIA     HEADACHES TENSION        .  Current Outpatient Prescriptions   Medication Sig Dispense Refill     metoprolol (TOPROL-XL) 50 MG 24 hr tablet Take 1 tablet (50 mg) by mouth At Bedtime (Patient taking differently: Take 25 mg by mouth At Bedtime ) 90 tablet 3     polyvinyl alcohol (ARTIFICIAL TEARS) 1.4 % ophthalmic solution Place 1 drop into both eyes as needed for dry eyes 15 mL [PRN     triamcinolone (KENALOG) 0.1 % cream Apply sparingly to affected area three times daily as needed 80 g 0     aspirin 81 MG tablet Take 1 tablet (81 mg) by mouth daily Correct ASA 30 tablet 11     carbidopa-levodopa (SINEMET)  MG per tablet Take 0.5 tablets by mouth 3 times daily 135 tablet 1     albuterol (PROAIR HFA/PROVENTIL HFA/VENTOLIN HFA) 108 (90 BASE) MCG/ACT Inhaler Inhale 2 puffs into the lungs every 6 hours as needed for shortness of breath / dyspnea or wheezing 1 Inhaler 1     omeprazole (PRILOSEC) 20 MG CR capsule TAKE ONE CAPSULE BY MOUTH ONCE DAILY EVERY MORNING ONE-HALF HOUR BEFORE A MEAL 90 capsule 3     acetaminophen (Q-PAP EXTRA STRENGTH) 500 MG tablet TAKE 1 TABLET BY MOUTH EVERY 6 HOURS AS NEEDED 100 tablet 5     vitamin D (ERGOCALCIFEROL) 52815 UNIT capsule TAKE 1 CAPSULE BY MOUTH EVERY WEEK 4 capsule 5     blood glucose monitoring (NO BRAND SPECIFIED) meter device kit Use to test blood sugar  Twice daily  times daily or as directed. 1 kit 0     blood glucose (NO BRAND SPECIFIED) lancets standard Use to test blood sugar  dialy times daily or as directed. 1 Box 0     blood glucose monitoring (NO BRAND SPECIFIED) test strip Use to test blood sugars  Daily  times daily or as directed 100 strip 0     blood glucose calibration (NO BRAND SPECIFIED) solution Use to calibrate blood glucose monitor as directed. 1 each 11     tamsulosin (FLOMAX) 0.4 MG 24 hr capsule TAKE 1 CAPSULE BY MOUTH EVERY DAY 90 capsule 3     finasteride (PROSCAR) 5 MG tablet Take 1 tablet (5  mg) by mouth daily 90 tablet 3     polyethylene glycol 0.4%- propylene glycol 0.3% (SYSTANE) 0.4-0.3 % SOLN ophthalmic solution Place 1 drop into both eyes 4 times daily as needed for dry eyes 1 Bottle 11     gabapentin (NEURONTIN) 100 MG capsule Take 1 capsule (100 mg) by mouth 2 times daily 180 capsule 3     atorvastatin (LIPITOR) 10 MG tablet Take 1 tablet (10 mg) by mouth daily 90 tablet 3     neomycin-polymyxin-hydrocortisone (CORTISPORIN) 3.5-37053-8 otic suspension Place 4 drops in ear(s) 4 times daily 10 mL 0     lidocaine (XYLOCAINE) 5 % ointment One application 4 x daily to affected areas lower back and knees if necessary 142 g 11     aspirin-acetaminophen-caffeine (EXCEDRIN MIGRAINE) 250-250-65 MG per tablet Take 1 tablet by mouth every 6 hours as needed for headaches (Patient not taking: Reported on 2017) 80 tablet 1     ACE/ARB NOT PRESCRIBED, INTENTIONAL, ACE & ARB not prescribed due to Intolerance and Other: hypokalemia x 2 episodes (Patient not taking: Reported on 2017)          No Known Allergies    Immunization History   Administered Date(s) Administered     Influenza (High Dose) 3 valent vaccine 10/18/2013, 09/15/2015, 10/18/2016     Influenza (IIV3) PF 2007, 10/21/2008, 10/11/2014     Pneumo Conj 13-V (2010&after) 2017     Pneumococcal 23 valent 2007, 10/11/2014     Zoster vaccine, live 10/11/2014         reports that he does not drink alcohol.      reports that he does not use illicit drugs.    family history includes Unknown/Adopted in his father and mother.    indicated that his mother is . He indicated that his father is .      has a past surgical history that includes SURGICAL PATHOLOGY (2010); Eye surgery (); and shoulder surgery.     reports that he does not engage in sexual activity.  .  Pediatric History   Patient Guardian Status     Not on file.     Other Topics Concern     Parent/Sibling W/ Cabg, Mi Or Angioplasty Before 65f 55m? No      unknown     Social History Narrative         reports that he has never smoked. He has never used smokeless tobacco.    Medical, social, surgical, and family histories reviewed.    Labs reviewed in EPIC  Patient Active Problem List   Diagnosis     Health Care Home     Dysuria     Essential and other specified forms of tremor     Abnormal involuntary movement     Lipoma     Cervical spondylosis without myelopathy     Degeneration of cervical intervertebral disc     Vitamin D deficiency     Memory loss     Obesity     Insomnia     Gastroesophageal reflux disease without esophagitis     Hyperlipidemia LDL goal <100     Pain in joint involving lower leg     Hypertension goal BP (blood pressure) < 140/80     Type 2 diabetes mellitus without complications (H)     Lumbago     Chronic pain of both knees     Headache     Nausea     UTI (urinary tract infection)     Lung nodule seen on imaging study     Bladder stones     ACP (advance care planning)     Hyponatremia     Tension headache     Past Surgical History:   Procedure Laterality Date     CL AFF SURGICAL PATHOLOGY  7/1/2010     EYE SURGERY  2003    retinal detatch     SHOULDER SURGERY         Social History   Substance Use Topics     Smoking status: Never Smoker     Smokeless tobacco: Never Used     Alcohol use No     Family History   Problem Relation Age of Onset     Unknown/Adopted Mother      Unknown/Adopted Father          No Known Allergies  Recent Labs   Lab Test  07/18/17   1049  08/22/16   1427   06/16/15   1045  05/06/15   0727   05/05/15   0818   A1C  5.4  5.7   --   5.7   --    --    --    LDL  128*  147*   --    --   61   --    --    HDL  55  52   --    --   61   --    --    TRIG  123  144   --    --   75   --    --    ALT  22  19   --   11  26   --   26   CR  0.95  0.99   < >  0.90  0.81   < >  0.86   GFRESTIMATED  76  73   < >  82  >90  Non  GFR Calc     < >  87   GFRESTBLACK  >90   GFR Calc    88   < >  >90  >90    American GFR Calc     < >  >90   GFR Calc     POTASSIUM  4.3  4.8   < >  4.3  3.6   < >  3.6   TSH  0.89   --    --    --    --    --   0.56    < > = values in this interval not displayed.        BP Readings from Last 6 Encounters:   01/25/18 118/64   07/18/17 112/62   01/09/17 118/62   12/12/16 118/64   10/18/16 122/64   08/22/16 124/62       Wt Readings from Last 3 Encounters:   01/25/18 153 lb (69.4 kg)   07/18/17 153 lb (69.4 kg)   01/09/17 168 lb 6.4 oz (76.4 kg)         Positive symptoms or findings indicated by bold designation:     ROS: 10 point ROS neg other than the symptoms noted above in the HPI.except  has Health Care Home; Dysuria; Essential and other specified forms of tremor; Abnormal involuntary movement; Lipoma; Cervical spondylosis without myelopathy; Degeneration of cervical intervertebral disc; Vitamin D deficiency; Memory loss; Obesity; Insomnia; Gastroesophageal reflux disease without esophagitis; Hyperlipidemia LDL goal <100; Pain in joint involving lower leg; Hypertension goal BP (blood pressure) < 140/80; Type 2 diabetes mellitus without complications (H); Lumbago; Chronic pain of both knees; Headache; Nausea; UTI (urinary tract infection); Lung nodule seen on imaging study; Bladder stones; ACP (advance care planning); Hyponatremia; and Tension headache on his problem list.   Constitutional: The patient denied fatigue, fever, insomnia, night sweats, recent illness and weight loss.  WEIGHT LOSS STABLE     Eyes: The patient denied blindness, eye pain, eye tearing, photophobia, vision change and visual disturbance.       Ears/Nose/Throat/Neck: The patient denied dizziness, facial pain, hearing loss, nasal discharge, oral pain, otalgia, postnasal drip, sinus congestion, sore throat, tinnitus and voice change.       Cardiovascular: The patient denied arrhythmia, chest pain/pressure, claudication, edema, exercise intolerance, fatigue, orthopnea, palpitations and syncope.       Respiratory: The patient denied asthma, chest congestion, cough, dyspnea on exertion, dyspnea/shortness of breath, hemoptysis, pedal edema, pleuritic pain, productive sputum, snoring and wheezing.     Gastrointestinal: The patient denied abdominal pain, anorexia, constipation, diarrhea, dysphagia, gastroesophageal reflux, hematochezia, hemorrhoids, melena, nausea and vomiting . GASTROESOPHAGEAL REFLUX DISEASE WITHOUT ESOPHAGITIS AND POOR APPETITE     Genitourinary/Nephrology: The patient denied breast complaint, dysuria, nocturia sexual dysfunction, t, urinary frequency, urinary incontinence, urinary urgency    OCCASIONAL DYSURIA     Musculoskeletal: The patient denied arthralgia(s), back pain, joint complaint, muscle weakness, myalgias, osteoporosis, sciatica, stiffness and swelling.  OSTEOARTHRITIS KNEES AND LOWER BACK PAIN     Dermatoligic:: The patient denied acne, dermatitis, ecchymosis, itching, mole change, rash, skin cancer, skin lesion and sores.      Neurologic: The patient denied dizziness, gait abnormality, headache, memory loss, mental status change, paresis, paresthesia, seizure, syncope, tremor and vision change.     Psychiatric: The patient denied anxiety, depression, disturbances of memory, drug abuse, insomnia, mood swings and relationship difficulties.  MILD MEMORY LOSS     Endocrine: The patient denied , goiter, obesity, polyuria and thyroid disease.      Hematologic/Lymphatic: The patient denied abnormal bleeding and bruising, abnormal ecchymoses, anemia, lymph node enlargement/mass, petechiae and venous  Thrombosis.      Allergy/Immunology: The patient denied food allergy and  Allergic rhinitis or conjunctivitis.        PE:  /64  Pulse 70  Temp 96.9  F (36.1  C) (Tympanic)  Resp 20  Wt 153 lb (69.4 kg)  SpO2 98%  BMI 26.26 kg/m2 Body mass index is 26.26 kg/(m^2).    Constitutional: general appearance, well nourished, well developed, in no acute distress, well developed, appears  stated age, normal body habitus,      Eyes:; The patient has normal eyelids sclerae and conjunctivae : NORMAL      Ears/Nose/Throat: external ear, overall: normal appearance; external nose, overall: benign appearance, normal moujth gums and lips  The patient has:  NORMAL     Neck: thyroid, overall: normal size, normal consistency, nontender,  NORMAL     Respiratory:  palpation of chest, overall: normal excursion, NORMAL   Clear to percussion and auscultation NORMAL     Tachypnea  NORMAL  Color  NORMAL     Cardiovascular:  Good color with no peripheral edema NORMAL   Regular sinus rhythm without murmur. Physiologic heart sounds Heart is unelarged  .   Chest/Breast: normal shape  NORMAL      Abdominal exam,  Liver and spleen are  unenlarged  NORMAL       Tenderness  NORMAL   Scars  NOT APPLICABLE     Urogenital; no renal, flank or bladder  tenderness;  NORMAL     Lymphatic: neck nodes, NORMAL    Other nodes  NOT APPLICABLE     Musculoskeletal:  Brief ortho exam normal except:   DECREASE RANGE OF MOTION BACK AND KNEE JEANINE N    Integument: inspection of skin, no rash, lesions; and, palpation, no induration, no tenderness.      Neurologic mental status, overall: alert and oriented; gait, no ataxia, no unsteadiness; coordination, no tremors; cranial nerves, overall: normal motor, overall: normal bulk, tone.      Psychiatric: orientation/consciousness, overall: oriented to person, place and time; behavior/psychomotor activity, no tics, normal psychomotor activity; mood and affect, overall: normal mood and affect; appearance, overall: well-groomed, good eye contact; speech, overall: normal quality, no aphasia and normal quality, quantity, intact.      Diagnostic Test Results:  Results for orders placed or performed in visit on 07/18/17   XR Chest 1 View    Narrative    XR CHEST 1 VW  7/18/2017 11:13 AM    HISTORY:  Encounter for screening for respiratory tuberculosis    COMPARISON:  5/5/2015      Impression    IMPRESSION:   Lungs are clear. No radiographic evidence for active  tuberculosis. Thoracolumbar scoliosis convex right.    MELA MCCLELLAN MD     INDICATION: Hypertension, headache    TECHNIQUE: Chest radiograph 2 views    COMPARISON: 04/20/2017    FINDINGS:     Mediastinum: The heart silhouette is normal in size and morphology. The mediastinum is normal in appearance.     Lungs: Both lungs are unremarkable in appearance. No sign of pleural effusion seen. No pneumothorax is identified.     Bones and soft tissue: Unremarkable for age.     IMPRESSION:   1. No acute cardiopulmonary disease is seen.      Dictated by Miguel Melendrez MD @ Jan 14 2018  3:05AM    (Electronically Signed)  INDICATION: Hypertension, headache    TECHNIQUE: Chest radiograph 2 views    COMPARISON: 04/20/2017    FINDINGS:     Mediastinum: The heart silhouette is normal in size and morphology. The mediastinum is normal in appearance.     Lungs: Both lungs are unremarkable in appearance. No sign of pleural effusion seen. No pneumothorax is identified.     Bones and soft tissue: Unremarkable for age.     IMPRESSION:   1. No acute cardiopulmonary disease is seen.      Dictated by Miguel Melendrez MD @ Jan 14 2018  3:05AM    (Electronically Signed)    ICD-10-CM    1. Type 2 diabetes mellitus without complication, without long-term current use of insulin (H) E11.9 OPHTHALMOLOGY ADULT REFERRAL   2. Influenza due to influenza virus, type A, human J10.1    3. Hyperlipidemia LDL goal <100 E78.5    4. Hypertension goal BP (blood pressure) < 140/80 I10    5. Gastroesophageal reflux disease without esophagitis K21.9    6. Chronic low back pain with sciatica, sciatica laterality unspecified, unspecified back pain laterality M54.40     G89.29    7. Memory loss R41.3    8. Vitamin D deficiency E55.9         .    Side effects benefits and risks thoroughly discussed. .he may come in early if unimproved or getting worse          Importance of adhering to regimen discussed and if medications  were dispensed, the importance of taking medications discussed and bringing in the medications after every visit for chronic problems         Please drink 2 glasses of water prior to meals and walk 15-30 minutes after meals    I spent  25 MINUTES SPENT  with patient discussing the following issues   The primary encounter diagnosis was Type 2 diabetes mellitus without complication, without long-term current use of insulin (H). Diagnoses of Influenza due to influenza virus, type A, human, Hyperlipidemia LDL goal <100, Hypertension goal BP (blood pressure) < 140/80, Gastroesophageal reflux disease without esophagitis, Chronic low back pain with sciatica, sciatica laterality unspecified, unspecified back pain laterality, Memory loss, and Vitamin D deficiency were also pertinent to this visit. over half of which involved counseling and coordination of care.    Patient Instructions   (E11.9) Type 2 diabetes mellitus without complication, without long-term current use of insulin (H)  (primary encounter diagnosis)  Comment:    Plan: OPHTHALMOLOGY ADULT REFERRAL             (E11.9) Type 2 diabetes mellitus without complication, without long-term current use of insulin (H)  (primary encounter diagnosis)  Comment:    Plan: OPHTHALMOLOGY ADULT REFERRAL             (J10.1) Influenza due to influenza virus, type A, human  Comment:    Plan:      (E78.5) Hyperlipidemia LDL goal <100  Comment:    Plan:      (I10) Hypertension goal BP (blood pressure) < 140/80  Comment:    Plan:      (K21.9) Gastroesophageal reflux disease without esophagitis  Comment:    Plan:      (M54.40,  G89.29) Chronic low back pain with sciatica, sciatica laterality unspecified, unspecified back pain laterality  Comment:    Plan:      (R41.3) Memory loss  Comment:    Plan:      (E55.9) Vitamin D deficiency  Comment:    Plan:                        ALL THE ABOVE PROBLEMS ARE STABLE AND MED CHANGES AS NOTED    Diet:  MEDITERRANEAN DIET     Exercise:  AS TOLERATE  D  Exercises Range of motion, balance, isometric, and strengthening exercises 30 repetitions twice daily of involved joints      .AMY BURNS MD 1/25/2018 2:53 PM  January 25, 2018           a

## 2018-01-25 NOTE — MR AVS SNAPSHOT
After Visit Summary   1/25/2018    Asha Sanford    MRN: [id number]           Patient Information     Date Of Birth          1935        Visit Information        Provider Department      1/25/2018 2:45 PM Edwardo Moser MD; [employer] [facility]        Today's Diagnoses     Type 2 diabetes mellitus without complication, without long-term current use of insulin (H)    -  1    Influenza due to influenza virus, type A, human        Hyperlipidemia LDL goal <100        Hypertension goal BP (blood pressure) < 140/80        Gastroesophageal reflux disease without esophagitis        Chronic low back pain with sciatica, sciatica laterality unspecified, unspecified back pain laterality        Memory loss        Vitamin D deficiency          Care Instructions    (E11.9) Type 2 diabetes mellitus without complication, without long-term current use of insulin (H)  (primary encounter diagnosis)  Comment:    Plan: OPHTHALMOLOGY ADULT REFERRAL             (E11.9) Type 2 diabetes mellitus without complication, without long-term current use of insulin (H)  (primary encounter diagnosis)  Comment:    Plan: OPHTHALMOLOGY ADULT REFERRAL             (J10.1) Influenza due to influenza virus, type A, human  Comment:    Plan:      (E78.5) Hyperlipidemia LDL goal <100  Comment:    Plan:      (I10) Hypertension goal BP (blood pressure) < 140/80  Comment:    Plan:      (K21.9) Gastroesophageal reflux disease without esophagitis  Comment:    Plan:      (M54.40,  G89.29) Chronic low back pain with sciatica, sciatica laterality unspecified, unspecified back pain laterality  Comment:    Plan:      (R41.3) Memory loss  Comment:    Plan:      (E55.9) Vitamin D deficiency  Comment:    Plan:                          Follow-ups after your visit        Additional Services     OPHTHALMOLOGY ADULT REFERRAL       Your provider has referred you to: [facility]  "[facility] [phone]   [email or web address]  Diabetes follow up  And worsening vision    Please be aware that coverage of these services is subject to the terms and limitations of your health insurance plan.  Call member services at your health plan with any benefit or coverage questions.      Please bring the following with you to your appointment:    (1) Any X-Rays, CTs or MRIs which have been performed.  Contact the facility where they were done to arrange for  prior to your scheduled appointment.    (2) List of current medications  (3) This referral request   (4) Any documents/labs given to you for this referral                  Who to contact     If you have questions or need follow up information about today's clinic visit or your schedule please contact [facility] directly at [phone].  Normal or non-critical lab and imaging results will be communicated to you by MyChart, letter or phone within 4 business days after the clinic has received the results. If you do not hear from us within 7 days, please contact the clinic through MyChart or phone. If you have a critical or abnormal lab result, we will notify you by phone as soon as possible.  Submit refill requests through [employer] or call your pharmacy and they will forward the refill request to us. Please allow 3 business days for your refill to be completed.          Additional Information About Your Visit        MyChart Information     [employer] lets you send messages to your doctor, view your test results, renew your prescriptions, schedule appointments and more. To sign up, go to [email or web address] . Click on \"Log in\" on the left side of the screen, which will take you to the Welcome page. Then click on \"Sign up Now\" on the right side of the page.     You will be asked to enter the access code listed below, as well as some personal information. Please follow the " directions to create your username and password.     Your access code is: [email or web address]  Expires: 2018  3:04 PM     Your access code will  in 90 days. If you need help or a new code, please call your [place] [facility] clinic or [phone].        Care EveryWhere ID     This is your Care EveryWhere ID. This could be used by other organizations to access your [facility] medical records  [id number]        Your Vitals Were     Pulse Temperature Respirations Pulse Oximetry BMI (Body Mass Index)       70 96.9  F (36.1  C) (Tympanic) 20 98% 26.26 kg/m2        Blood Pressure from Last 3 Encounters:   18 118/64   17 112/62   17 118/62    Weight from Last 3 Encounters:   18 153 lb (69.4 kg)   17 153 lb (69.4 kg)   17 168 lb 6.4 oz (76.4 kg)              We Performed the Following     OPHTHALMOLOGY ADULT REFERRAL          Today's Medication Changes          These changes are accurate as of 18  3:04 PM.  If you have any questions, ask your nurse or doctor.               These medicines have changed or have updated prescriptions.        Dose/Directions    metoprolol succinate 50 MG 24 hr tablet   Commonly known as:  TOPROL-XL   This may have changed:  how much to take   Used for:  Essential hypertension, benign        Dose:  50 mg   Take 1 tablet (50 mg) by mouth At Bedtime   Quantity:  90 tablet   Refills:  3                Primary Care Provider Office Phone # Fax #    Edwardo Linda Moser [phone] [address] [facility] [address]        Equal Access to Services     [facility]: Hadjordyn Capellan, warobin lira, qaybta kaalgarcia mack. So [facility] [phone].    ATENCIÓN: Si habla español, tiene a norton disposición servicios gratuitos de asistencia lingüística. Llame al [phone].    We comply with applicable federal civil rights laws and [place] laws. We do not discriminate on the basis  of race, color, national origin, age, disability, sex, sexual orientation, or gender identity.            Thank you!     Thank you for choosing [facility]  for your care. Our goal is always to provide you with excellent care. Hearing back from our patients is one way we can continue to improve our services. Please take a few minutes to complete the written survey that you may receive in the mail after your visit with us. Thank you!             Your Updated Medication List - Protect others around you: Learn how to safely use, store and throw away your medicines at www.disposemymeds.org.          This list is accurate as of 1/25/18  3:04 PM.  Always use your most recent med list.                   Brand Name Dispense Instructions for use Diagnosis    ACE/ARB/ARNI NOT PRESCRIBED (INTENTIONAL)      ACE & ARB not prescribed due to Intolerance and Other: hypokalemia x 2 episodes    Type 2 diabetes mellitus without complications (H), Hyponatremia       acetaminophen 500 MG tablet    Q-PAP EXTRA STRENGTH    100 tablet    TAKE 1 TABLET BY MOUTH EVERY 6 HOURS AS NEEDED    Chronic low back pain with sciatica, sciatica laterality unspecified, unspecified back pain laterality, Acute pain of right knee       albuterol 108 (90 BASE) MCG/ACT Inhaler    PROAIR HFA/PROVENTIL HFA/VENTOLIN HFA    1 Inhaler    Inhale 2 puffs into the lungs every 6 hours as needed for shortness of breath / dyspnea or wheezing    Wheezing       aspirin 81 MG tablet     30 tablet    Take 1 tablet (81 mg) by mouth daily Correct ASA    Hyperlipidemia LDL goal <100, Hypertension goal BP (blood pressure) < 140/80       aspirin-acetaminophen-caffeine 250-250-65 MG per tablet    EXCEDRIN MIGRAINE    80 tablet    Take 1 tablet by mouth every 6 hours as needed for headaches    Headache(784.0), Cervicalgia       atorvastatin 10 MG tablet    LIPITOR    90 tablet    Take 1 tablet (10 mg) by mouth daily    Hyperlipidemia LDL goal  <100       blood glucose calibration solution    no brand specified    1 each    Use to calibrate blood glucose monitor as directed.    Type 2 diabetes mellitus without complication, without long-term current use of insulin (H)       blood glucose lancets standard    no brand specified    1 Box    Use to test blood sugar  dialy times daily or as directed.    Type 2 diabetes mellitus without complication, without long-term current use of insulin (H)       blood glucose monitoring meter device kit    no brand specified    1 kit    Use to test blood sugar  Twice daily  times daily or as directed.    Type 2 diabetes mellitus without complication, without long-term current use of insulin (H)       blood glucose monitoring test strip    no brand specified    100 strip    Use to test blood sugars  Daily  times daily or as directed    Type 2 diabetes mellitus without complication, without long-term current use of insulin (H)       carbidopa-levodopa  MG per tablet    SINEMET    135 tablet    Take 0.5 tablets by mouth 3 times daily    Tremor       finasteride 5 MG tablet    PROSCAR    90 tablet    Take 1 tablet (5 mg) by mouth daily    Hesitancy of micturition       gabapentin 100 MG capsule    NEURONTIN    180 capsule    Take 1 capsule (100 mg) by mouth 2 times daily    Headache(784.0), Cervicalgia       lidocaine 5 % ointment    XYLOCAINE    142 g    One application 4 x daily to affected areas lower back and knees if necessary    Chronic right-sided low back pain with sciatica, sciatica laterality unspecified       metoprolol succinate 50 MG 24 hr tablet    TOPROL-XL    90 tablet    Take 1 tablet (50 mg) by mouth At Bedtime    Essential hypertension, benign       neomycin-polymyxin-hydrocortisone 3.5-36928-3 otic suspension    CORTISPORIN    10 mL    Place 4 drops in ear(s) 4 times daily    Otalgia of left ear       omeprazole 20 MG CR capsule    priLOSEC    90 capsule    TAKE ONE CAPSULE BY MOUTH ONCE DAILY EVERY  MORNING ONE-HALF HOUR BEFORE A MEAL    Gastroesophageal reflux disease without esophagitis       polyethylene glycol 0.4%- propylene glycol 0.3% 0.4-0.3 % Soln ophthalmic solution    SYSTANE    1 Bottle    Place 1 drop into both eyes 4 times daily as needed for dry eyes    Dry eyes, bilateral       polyvinyl alcohol 1.4 % ophthalmic solution    ARTIFICIAL TEARS    15 mL    Place 1 drop into both eyes as needed for dry eyes    Dry eyes       tamsulosin 0.4 MG capsule    FLOMAX    90 capsule    TAKE 1 CAPSULE BY MOUTH EVERY DAY    Dysuria       triamcinolone 0.1 % cream    KENALOG    80 g    Apply sparingly to affected area three times daily as needed    Blister of leg, left, initial encounter       vitamin D 38411 UNIT capsule    ERGOCALCIFEROL    4 capsule    TAKE 1 CAPSULE BY MOUTH EVERY WEEK    Vitamin D deficiency

## 2018-02-04 DIAGNOSIS — I10 ESSENTIAL HYPERTENSION, BENIGN: ICD-10-CM

## 2018-02-04 DIAGNOSIS — R25.1 TREMOR: ICD-10-CM

## 2018-02-04 DIAGNOSIS — D50.8 IRON DEFICIENCY ANEMIA DUE TO DIETARY CAUSES: Primary | ICD-10-CM

## 2018-02-04 DIAGNOSIS — E55.9 VITAMIN D DEFICIENCY: ICD-10-CM

## 2018-02-04 DIAGNOSIS — K21.9 GASTROESOPHAGEAL REFLUX DISEASE WITHOUT ESOPHAGITIS: Chronic | ICD-10-CM

## 2018-02-04 RX ORDER — METOPROLOL SUCCINATE 50 MG/1
TABLET, EXTENDED RELEASE ORAL
Qty: 90 TABLET | Refills: 0 | Status: CANCELLED | OUTPATIENT
Start: 2018-02-04

## 2018-02-05 DIAGNOSIS — E55.9 VITAMIN D DEFICIENCY: ICD-10-CM

## 2018-02-05 DIAGNOSIS — K21.9 GASTROESOPHAGEAL REFLUX DISEASE WITHOUT ESOPHAGITIS: Primary | ICD-10-CM

## 2018-02-05 DIAGNOSIS — I10 ESSENTIAL HYPERTENSION, BENIGN: ICD-10-CM

## 2018-02-05 DIAGNOSIS — R25.1 TREMOR: ICD-10-CM

## 2018-02-05 RX ORDER — METOPROLOL SUCCINATE 50 MG/1
TABLET, EXTENDED RELEASE ORAL
Qty: 90 TABLET | Refills: 0 | Status: CANCELLED | OUTPATIENT
Start: 2018-02-05

## 2018-02-05 NOTE — TELEPHONE ENCOUNTER
Osop pt's  was called. States she called pharmacy and was asked to contact PCP. Pt only has 1 BP tablet left. SW does not know current medication dose. She will call pt tomorrow and call clinic with dose pt needs refilled.

## 2018-02-05 NOTE — TELEPHONE ENCOUNTER
"Requested Prescriptions   Pending Prescriptions Disp Refills     metoprolol succinate (TOPROL-XL) 50 MG 24 hr tablet [Pharmacy Med Name: METOPROLOL ER SUCCINATE 50MG TABS]  Last Written Prescription Date:  1/9/2017  Last Fill Quantity: 90,  # refills: 3   Last Office Visit with [facility] provider:  7/18/2017   Future Office Visit:      90 tablet 0     Sig: TAKE 1 TABLET(50 MG) BY MOUTH AT BEDTIME    Beta-Blockers Protocol Passed    2/4/2018 11:08 AM       Passed - Blood pressure under 140/90    BP Readings from Last 3 Encounters:   01/25/18 118/64   07/18/17 112/62   01/09/17 118/62                Passed - Patient is age 6 or older       Passed - Recent or future visit with authorizing provider's specialty    Patient had office visit in the last year or has a visit in the next 30 days with authorizing provider.  See \"Patient Info\" tab in inbasket, or \"Choose Columns\" in Meds & Orders section of the refill encounter.                                carbidopa-levodopa (SINEMET)  MG per tablet [Pharmacy Med Name: CARBIDOPA/LEVODOPA 25-100MG TABS]  Last Written Prescription Date:  1/9/2017  Last Fill Quantity: 135,  # refills: 1   Last Office Visit with [facility] provider:  7/18/2017   Future Office Visit:      135 tablet 0     Sig: TAKE 1/2 TABLET BY MOUTH THREE TIMES DAILY    Antiparkinson's Agents Protocol Failed    2/4/2018 11:08 AM       Failed - CBC on record in past 12 months    Recent Labs   Lab Test  05/06/15   0727   WBC  11.1*   RBC  4.12*   HGB  12.6*   HCT  37.6*   PLT  182            Passed - Blood pressure under 140/90    BP Readings from Last 3 Encounters:   01/25/18 118/64   07/18/17 112/62   01/09/17 118/62                Passed - ALT on record in past 12 months        Recent Labs   Lab Test  07/18/17   1049   ALT  22            Passed - Serum Creatinine on file in past 12 months    Recent Labs   Lab Test  07/18/17   1049   CR  0.95            Passed - Recent or future visit with authorizing provider's " "specialty    Patient had office visit in the last year or has a visit in the next 30 days with authorizing provider.  See \"Patient Info\" tab in inbasket, or \"Choose Columns\" in Meds & Orders section of the refill encounter.            Passed - Patient is age 18 or older        vitamin D (ERGOCALCIFEROL) 86684 UNIT capsule      Last Written Prescription Date:  1/9/2017  Last Fill Quantity: 4,   # refills: 5  Last Office Visit: 7/18/2017  Future Office visit:       Routing refill request to provider for review/approval because:  Drug not on the [facility], P or [facility] refill protocol or controlled substance      carbidopa-levodopa (SINEMET)  MG per tablet  "

## 2018-02-05 NOTE — TELEPHONE ENCOUNTER
Reason for Call:  Medication or medication refill:    Do you use a [facility]?  Name of the pharmacy and phone number for the current request:  [facility]  Name of the medication requested: blood pressure medication and bladder medication    Other request: None    Can we leave a detailed message on this number? yes    Phone number patient can be reached at: Other phone number:   Osop     Best Time: Anytime    Call taken on 2/5/2018 at 4:32 PM by Diane Silva

## 2018-02-06 RX ORDER — ERGOCALCIFEROL 1.25 MG/1
CAPSULE, LIQUID FILLED ORAL
Qty: 4 CAPSULE | Refills: 0 | Status: SHIPPED | OUTPATIENT
Start: 2018-02-06 | End: 2018-06-01

## 2018-02-06 RX ORDER — METOPROLOL SUCCINATE 50 MG/1
50 TABLET, EXTENDED RELEASE ORAL AT BEDTIME
Qty: 90 TABLET | Refills: 3 | Status: CANCELLED | OUTPATIENT
Start: 2018-02-06

## 2018-02-06 RX ORDER — METOPROLOL SUCCINATE 50 MG/1
50 TABLET, EXTENDED RELEASE ORAL AT BEDTIME
Qty: 90 TABLET | Refills: 0 | Status: SHIPPED | OUTPATIENT
Start: 2018-02-06 | End: 2018-05-16

## 2018-02-06 RX ORDER — CARBIDOPA AND LEVODOPA 25; 100 MG/1; MG/1
TABLET ORAL
Qty: 135 TABLET | Refills: 1 | Status: SHIPPED | OUTPATIENT
Start: 2018-02-06 | End: 2018-06-01

## 2018-02-06 NOTE — TELEPHONE ENCOUNTER
Routing refill request to provider for review/approval because:  Labs not current:  CBC needed for the Sinemet     Vit D 50,000         Next 5 appointments (look out 90 days)     Feb 12, 2018  3:15 PM CST   [facility]    [address] [place] [id number] [phone]               last Vit D level = 19 on 7-18-17    Routing refill request to provider for review/approval because:  Drug not on the [facility], [facility] or [facility] refill protocol or controlled substance

## 2018-02-06 NOTE — TELEPHONE ENCOUNTER
"Last office visit:  1/25/2018  Requested Prescriptions   Pending Prescriptions Disp Refills     carbidopa-levodopa (SINEMET)  MG per tablet [Pharmacy Med Name: CARBIDOPA/LEVODOPA 25-100MG TABS] 135 tablet 0     Sig: TAKE 1/2 TABLET BY MOUTH THREE TIMES DAILY    Antiparkinson's Agents Protocol Failed    2/5/2018  4:15 PM       Failed - CBC on record in past 12 months    Recent Labs   Lab Test  05/06/15   0727   WBC  11.1*   RBC  4.12*   HGB  12.6*   HCT  37.6*   PLT  182            Passed - Blood pressure under 140/90    BP Readings from Last 3 Encounters:   01/25/18 118/64   07/18/17 112/62   01/09/17 118/62                Passed - ALT on record in past 12 months        Recent Labs   Lab Test  07/18/17   1049   ALT  22            Passed - Serum Creatinine on file in past 12 months    Recent Labs   Lab Test  07/18/17   1049   CR  0.95            Passed - Recent or future visit with authorizing provider's specialty    Patient had office visit in the last year or has a visit in the next 30 days with authorizing provider.  See \"Patient Info\" tab in inbasket, or \"Choose Columns\" in Meds & Orders section of the refill encounter.            Passed - Patient is age 18 or older        vitamin D (ERGOCALCIFEROL) 19515 UNIT capsule [Pharmacy Med Name: VITAMIN D2 50,000IU (ERGO) CAP RX] 4 capsule 0     Sig: TAKE 1 CAPSULE BY MOUTH EVERY WEEK    There is no refill protocol information for this order        "

## 2018-02-06 NOTE — TELEPHONE ENCOUNTER
Osop was called, she still needs to recall patient for medication names. Not sure what bladder medication he needs.     metoprolol (TOPROL-XL) 50 MG 24 hr tablet     Last OV 1/25/18, Future OV on 2/12/18.     Prescription approved per [facility] Refill Protocol.

## 2018-02-06 NOTE — TELEPHONE ENCOUNTER
Routing refill request to provider for review/approval because:  1.  Labs not current:  CBC needed for the Sinemet  2.  Please send either the Toprol for 25mg or keep at 50mg. Per med list, patient states he is taking 25mg at HS    3.  Vit D 50,000u    Last level =19 on 7-18-17   fill request to provider for review/approval because:  Drug not on the [facility], P or  Health refill protocol or controlled substance

## 2018-03-29 ENCOUNTER — PATIENT OUTREACH (OUTPATIENT)
Dept: GERIATRIC MEDICINE | Facility: CLINIC | Age: 83
End: 2018-03-29

## 2018-03-29 NOTE — PROGRESS NOTES
[facility] Care Coordination Contact    [facility] Six-Month Telephone Assessment    6 month telephone assessment completed on 3/29/18.    ER visits: Yes -  [facility] 1/12/18   influenza, hypertension  Hospitalizations: Yes -  [facility] 1/12/2018- 1/13/2018 d/t influenza, HTN  TCU stays: No  Significant health status changes: No  Falls/Injuries: No  ADL/IADL changes: No  Changes in services: No    Caregiver Assessment follow up:  N/A. Has paid caregiver.    Goals: See POC in chart for goal progress documentation.      Will see member in 6 months for an annual health risk assessment.   Encouraged member to call CC with any questions or concerns in the meantime.     Michelle Melchor RN BSN PHN  [facility]   [phone]  Fax: [phone]

## 2018-05-16 DIAGNOSIS — I10 ESSENTIAL HYPERTENSION, BENIGN: ICD-10-CM

## 2018-05-17 RX ORDER — METOPROLOL SUCCINATE 50 MG/1
TABLET, EXTENDED RELEASE ORAL
Qty: 90 TABLET | Refills: 0 | Status: SHIPPED | OUTPATIENT
Start: 2018-05-17 | End: 2018-06-01

## 2018-05-17 NOTE — TELEPHONE ENCOUNTER
"METOPROLOL ER SUCCINATE 50MG TABS  Last Written Prescription Date:  02/06/18  Last Fill Quantity: 90,  # refills: 0   Last office visit: 1/25/2018 with prescribing provider:     Future Office Visit:    Requested Prescriptions   Pending Prescriptions Disp Refills     metoprolol succinate (TOPROL-XL) 50 MG 24 hr tablet [Pharmacy Med Name: METOPROLOL ER SUCCINATE 50MG TABS] 90 tablet 0     Sig: TAKE 1 TABLET(50 MG) BY MOUTH AT BEDTIME    Beta-Blockers Protocol Passed    5/16/2018  7:44 PM       Passed - Blood pressure under 140/90 in past 12 months    BP Readings from Last 3 Encounters:   01/25/18 118/64   07/18/17 112/62   01/09/17 118/62                Passed - Patient is age 6 or older       Passed - Recent (12 mo) or future (30 days) visit within the authorizing provider's specialty    Patient had office visit in the last 12 months or has a visit in the next 30 days with authorizing provider or within the authorizing provider's specialty.  See \"Patient Info\" tab in inbasket, or \"Choose Columns\" in Meds & Orders section of the refill encounter.              "

## 2018-05-17 NOTE — TELEPHONE ENCOUNTER
Medication is being filled for 1 time refill only due to:  Patient is due for DM visit Marlena Crane RN- Triage [employer]

## 2018-05-25 ENCOUNTER — TELEPHONE (OUTPATIENT)
Dept: FAMILY MEDICINE | Facility: CLINIC | Age: 83
End: 2018-05-25

## 2018-05-25 NOTE — TELEPHONE ENCOUNTER
Asha Sanford is a 83 year old male who calls with chest pain.     PRESENTING PROBLEM:  Chest pain    NURSING ASSESSMENT:  Patient complains of chest pain.  Onset:  3 days  Pain is characterized as described like GERD   Severity off and on  Located under breast   Radiates to none.  Duration intermittent.  Associated symptoms none.  Exacerbated by unknown. Attributes   Relieved by Pain reliever?.  Cardiac risk factors: hypertension, diabetes and high cholesterol.  Associated Medical History: see above  Allergies: No Known Allergies    MEDICATIONS:  Taking medication(s) as prescribed? Yes and No  Taking over the counter medication(s) ?Yes  Any medication side effects? No significant side effects    Any barriers to taking medication(s) as prescribed?  No  Medication(s) improving/managing symptoms?  Yes  Medication reconciliation completed: Yes    Last exam/Treatment:  01/25/22018    NURSING PLAN: Nursing advice to patient see ED if symptoms persist or worsen. Warning signs reviewed. OV was scheduled.    RECOMMENDED DISPOSITION:  Home care advice - see above  Will comply with recommendation: Yes  If further questions/concerns or if symptoms do not improve, worsen or new symptoms develop, call your PCP or [facility] as soon as possible.      Guideline used:  Telephone Triage Protocols for Nurses, Fourth Edition, Cindy Ferreira RN

## 2018-06-01 ENCOUNTER — OFFICE VISIT (OUTPATIENT)
Dept: FAMILY MEDICINE | Facility: CLINIC | Age: 83
End: 2018-06-01
Payer: COMMERCIAL

## 2018-06-01 VITALS
SYSTOLIC BLOOD PRESSURE: 126 MMHG | BODY MASS INDEX: 26.26 KG/M2 | HEART RATE: 67 BPM | DIASTOLIC BLOOD PRESSURE: 74 MMHG | OXYGEN SATURATION: 96 % | RESPIRATION RATE: 16 BRPM | TEMPERATURE: 97.9 F | WEIGHT: 153 LBS

## 2018-06-01 DIAGNOSIS — M25.561 ACUTE PAIN OF RIGHT KNEE: Chronic | ICD-10-CM

## 2018-06-01 DIAGNOSIS — S80.822A: ICD-10-CM

## 2018-06-01 DIAGNOSIS — H04.123 DRY EYES: ICD-10-CM

## 2018-06-01 DIAGNOSIS — Z13.89 SCREENING FOR DIABETIC PERIPHERAL NEUROPATHY: ICD-10-CM

## 2018-06-01 DIAGNOSIS — J34.0 NASAL FURUNCLE: ICD-10-CM

## 2018-06-01 DIAGNOSIS — E55.9 VITAMIN D DEFICIENCY: ICD-10-CM

## 2018-06-01 DIAGNOSIS — R39.11 HESITANCY OF MICTURITION: ICD-10-CM

## 2018-06-01 DIAGNOSIS — I10 ESSENTIAL HYPERTENSION, BENIGN: ICD-10-CM

## 2018-06-01 DIAGNOSIS — R07.89 OTHER CHEST PAIN: ICD-10-CM

## 2018-06-01 DIAGNOSIS — M54.40 CHRONIC RIGHT-SIDED LOW BACK PAIN WITH SCIATICA, SCIATICA LATERALITY UNSPECIFIED: ICD-10-CM

## 2018-06-01 DIAGNOSIS — H04.123 DRY EYES, BILATERAL: ICD-10-CM

## 2018-06-01 DIAGNOSIS — G89.29 CHRONIC LOW BACK PAIN WITH SCIATICA, SCIATICA LATERALITY UNSPECIFIED, UNSPECIFIED BACK PAIN LATERALITY: Chronic | ICD-10-CM

## 2018-06-01 DIAGNOSIS — R25.1 TREMOR: ICD-10-CM

## 2018-06-01 DIAGNOSIS — R06.2 WHEEZING: ICD-10-CM

## 2018-06-01 DIAGNOSIS — E11.9 TYPE 2 DIABETES MELLITUS WITHOUT COMPLICATION, WITHOUT LONG-TERM CURRENT USE OF INSULIN (H): Primary | Chronic | ICD-10-CM

## 2018-06-01 DIAGNOSIS — K21.9 GASTROESOPHAGEAL REFLUX DISEASE WITHOUT ESOPHAGITIS: Chronic | ICD-10-CM

## 2018-06-01 DIAGNOSIS — G89.29 CHRONIC RIGHT-SIDED LOW BACK PAIN WITH SCIATICA, SCIATICA LATERALITY UNSPECIFIED: ICD-10-CM

## 2018-06-01 DIAGNOSIS — I10 HYPERTENSION GOAL BP (BLOOD PRESSURE) < 140/80: Chronic | ICD-10-CM

## 2018-06-01 DIAGNOSIS — M54.2 CERVICALGIA: ICD-10-CM

## 2018-06-01 DIAGNOSIS — E78.5 HYPERLIPIDEMIA LDL GOAL <100: Chronic | ICD-10-CM

## 2018-06-01 DIAGNOSIS — M54.40 CHRONIC LOW BACK PAIN WITH SCIATICA, SCIATICA LATERALITY UNSPECIFIED, UNSPECIFIED BACK PAIN LATERALITY: Chronic | ICD-10-CM

## 2018-06-01 DIAGNOSIS — R30.0 DYSURIA: ICD-10-CM

## 2018-06-01 DIAGNOSIS — H92.02 OTALGIA OF LEFT EAR: ICD-10-CM

## 2018-06-01 LAB — HBA1C MFR BLD: 5.6 % (ref 0–5.6)

## 2018-06-01 PROCEDURE — 80061 LIPID PANEL: CPT | Performed by: FAMILY MEDICINE

## 2018-06-01 PROCEDURE — 93000 ELECTROCARDIOGRAM COMPLETE: CPT | Performed by: FAMILY MEDICINE

## 2018-06-01 PROCEDURE — 82043 UR ALBUMIN QUANTITATIVE: CPT | Performed by: FAMILY MEDICINE

## 2018-06-01 PROCEDURE — 84460 ALANINE AMINO (ALT) (SGPT): CPT | Performed by: FAMILY MEDICINE

## 2018-06-01 PROCEDURE — 80048 BASIC METABOLIC PNL TOTAL CA: CPT | Performed by: FAMILY MEDICINE

## 2018-06-01 PROCEDURE — 83036 HEMOGLOBIN GLYCOSYLATED A1C: CPT | Performed by: FAMILY MEDICINE

## 2018-06-01 PROCEDURE — 99207 C FOOT EXAM  NO CHARGE: CPT | Performed by: FAMILY MEDICINE

## 2018-06-01 PROCEDURE — 99214 OFFICE O/P EST MOD 30 MIN: CPT | Mod: 25 | Performed by: FAMILY MEDICINE

## 2018-06-01 PROCEDURE — 36415 COLL VENOUS BLD VENIPUNCTURE: CPT | Performed by: FAMILY MEDICINE

## 2018-06-01 RX ORDER — ATORVASTATIN CALCIUM 20 MG/1
20 TABLET, FILM COATED ORAL DAILY
Qty: 90 TABLET | Refills: 3 | Status: SHIPPED | OUTPATIENT
Start: 2018-06-01 | End: 2018-09-18

## 2018-06-01 RX ORDER — CARBIDOPA AND LEVODOPA 25; 100 MG/1; MG/1
TABLET ORAL
Qty: 135 TABLET | Refills: 3 | Status: SHIPPED | OUTPATIENT
Start: 2018-06-01 | End: 2018-09-18

## 2018-06-01 RX ORDER — LIDOCAINE 50 MG/G
OINTMENT TOPICAL
Qty: 142 G | Refills: 11 | Status: SHIPPED | OUTPATIENT
Start: 2018-06-01 | End: 2018-09-18

## 2018-06-01 RX ORDER — FINASTERIDE 5 MG/1
5 TABLET, FILM COATED ORAL DAILY
Qty: 90 TABLET | Refills: 3 | Status: SHIPPED | OUTPATIENT
Start: 2018-06-01 | End: 2018-09-18

## 2018-06-01 RX ORDER — ERGOCALCIFEROL 1.25 MG/1
CAPSULE, LIQUID FILLED ORAL
Qty: 4 CAPSULE | Refills: 11 | Status: SHIPPED | OUTPATIENT
Start: 2018-06-01 | End: 2018-06-01

## 2018-06-01 RX ORDER — NEOMYCIN SULFATE, POLYMYXIN B SULFATE AND HYDROCORTISONE 10; 3.5; 1 MG/ML; MG/ML; [USP'U]/ML
4 SUSPENSION/ DROPS AURICULAR (OTIC) 4 TIMES DAILY
Qty: 10 ML | Refills: 0 | Status: SHIPPED | OUTPATIENT
Start: 2018-06-01 | End: 2018-09-18

## 2018-06-01 RX ORDER — TAMSULOSIN HYDROCHLORIDE 0.4 MG/1
CAPSULE ORAL
Qty: 90 CAPSULE | Refills: 11 | Status: SHIPPED | OUTPATIENT
Start: 2018-06-01 | End: 2018-09-18

## 2018-06-01 RX ORDER — GABAPENTIN 100 MG/1
100 CAPSULE ORAL 2 TIMES DAILY
Qty: 180 CAPSULE | Refills: 3 | Status: SHIPPED | OUTPATIENT
Start: 2018-06-01 | End: 2018-09-18

## 2018-06-01 RX ORDER — POLYVINYL ALCOHOL 14 MG/ML
1 SOLUTION/ DROPS OPHTHALMIC PRN
Qty: 15 ML | Status: SHIPPED | OUTPATIENT
Start: 2018-06-01 | End: 2018-09-18

## 2018-06-01 RX ORDER — ERGOCALCIFEROL 1.25 MG/1
CAPSULE, LIQUID FILLED ORAL
Qty: 12 CAPSULE | Refills: 11 | Status: SHIPPED | OUTPATIENT
Start: 2018-06-01 | End: 2019-06-04

## 2018-06-01 RX ORDER — ACETAMINOPHEN 500 MG
TABLET ORAL
Qty: 100 TABLET | Refills: 5 | Status: SHIPPED | OUTPATIENT
Start: 2018-06-01 | End: 2018-09-18

## 2018-06-01 RX ORDER — ALBUTEROL SULFATE 90 UG/1
2 AEROSOL, METERED RESPIRATORY (INHALATION) EVERY 6 HOURS PRN
Qty: 1 INHALER | Refills: 0 | Status: SHIPPED | OUTPATIENT
Start: 2018-06-01 | End: 2018-09-18

## 2018-06-01 RX ORDER — TRIAMCINOLONE ACETONIDE 1 MG/G
CREAM TOPICAL
Qty: 80 G | Refills: 0 | Status: SHIPPED | OUTPATIENT
Start: 2018-06-01 | End: 2018-09-18

## 2018-06-01 RX ORDER — METOPROLOL SUCCINATE 50 MG/1
50 TABLET, EXTENDED RELEASE ORAL DAILY
Qty: 90 TABLET | Refills: 3 | Status: SHIPPED | OUTPATIENT
Start: 2018-06-01 | End: 2018-09-18

## 2018-06-01 NOTE — TELEPHONE ENCOUNTER
Requested Prescriptions   Pending Prescriptions Disp Refills     vitamin D (ERGOCALCIFEROL) 11322 UNIT capsule [Pharmacy Med Name: VITAMIN D2 50,000IU (ERGO) CAP RX] 12 capsule 11     Sig: TAKE 1 CAPSULE BY MOUTH EVERY WEEK    There is no refill protocol information for this order        Routing refill request to provider for review/approval because:  Drug not on the [facility] refill protocol

## 2018-06-01 NOTE — PROGRESS NOTES
SUBJECTIVE:   Asha Sanford is a 83 year old male who presents to clinic today for the following health issues:      Chest Pain      Onset: 20 days    Description (location/character/radiation/duration): has been feeling pain in the sternum for 3 weeks    Intensity:  moderate    Accompanying signs and symptoms:        Shortness of breath: no        Sweating: no        Nausea/vomitting: no        Palpitations: no        Other (fevers/chills/cough/heartburn/lightheadedness): YES- reflux    History (similar episodes/previous evaluation): None    Precipitating or alleviating factors:       Worse with exertion: no        Worse with breathing: no        Related to eating: no        Better with burping: no  Pain is worse when leaning over forward when praying    Therapies tried and outcome: None      nose      Duration: 1 month    Description (location/character/radiation): inside left nostril    Intensity:  mild    Accompanying signs and symptoms: feels like a bump inside of nostril    History (similar episodes/previous evaluation): None    Precipitating or alleviating factors: None    Therapies tried and outcome: None     (Z13.89) Screening for diabetic peripheral neuropathy  (primary encounter diagnosis)  Comment:    Plan: FOOT EXAM  NO CHARGE [14199.114]         NORMAL CIRCULATION MOTION AND SENSATION AND MONOFILAMENT TESTING TODAY     (R07.89) Other chest pain  Comment:    Plan: EKG 12-lead complete w/read - Clinics, Albumin         Random Urine Quantitative with Creat Ratio         NORMAL AND UNCHANGED     (E11.9) Type 2 diabetes mellitus without complication, without long-term current use of insulin (H)  Comment:    Plan: HEMOGLOBIN A1C         CONTROLLED CURRENT REGIMENT     (E78.5) Hyperlipidemia LDL goal <100  Comment:    Plan: Lipid panel reflex to direct LDL Fasting, ALT,         atorvastatin (LIPITOR) 20 MG tablet         CONTINUE ATORVASTATIN     (I10) Hypertension goal BP (blood pressure) <  140/80  Comment:    Plan: Basic metabolic panel             (J34.0) Nasal furuncle  Comment:    Plan: mupirocin (BACTROBAN) 2 % nasal ointment         TWICE DAILY PRN     (R06.2) Wheezing  Comment:    Plan: albuterol (PROAIR HFA/PROVENTIL HFA/VENTOLIN         HFA) 108 (90 Base) MCG/ACT Inhaler         CONTROLLED     (K21.9) Gastroesophageal reflux disease without esophagitis  Comment:    Plan: omeprazole (PRILOSEC) 20 MG CR capsule         CONTROLED DIET AND EXERCISE     (H04.123) Dry eyes, bilateral  Comment:    Plan: polyethylene glycol 0.4%- propylene glycol 0.3%        (SYSTANE) 0.4-0.3 % SOLN ophthalmic solution         CONTROLED DROPS         (R30.0) Dysuria  Comment: BENIGN PROSTATIC HYPERTROPHY SYMPTOMS CONTROLLED   Plan: tamsulosin (FLOMAX) 0.4 MG capsule         TWICE DAILY   FINASTERIDE 5MG DAILY    (S80.822A) Blister of leg, left, initial encounter  Comment:    Plan: triamcinolone (KENALOG) 0.1 % cream         CONTROLLED AND IMPROVED      (E55.9) Vitamin D deficiency  Comment:    Plan: DISCONTINUED: vitamin D (ERGOCALCIFEROL) 58487         UNIT capsule             (H92.02) Otalgia of left ear  Comment:    Plan: neomycin-polymyxin-hydrocortisone (CORTISPORIN)        3.5-92706-3 otic suspension         FOUR TIMES DAILY PRN     (I10) Essential hypertension, benign  Comment:    Plan: metoprolol succinate (TOPROL-XL) 50 MG 24 hr         tablet         CONTROLED WEIGHT     (M54.40,  G89.29) Chronic low back pain with sciatica, sciatica laterality unspecified, unspecified back pain laterality  Comment:    Plan: acetaminophen (MAPAP) 500 MG tablet             (M25.561) Acute pain of right knee  Comment:    Plan: acetaminophen (MAPAP) 500 MG tablet             (M54.40,  G89.29) Chronic right-sided low back pain with sciatica, sciatica laterality unspecified  Comment:    Plan: lidocaine (XYLOCAINE) 5 % ointment             (M54.2) Cervicalgia  Comment:    Plan: gabapentin (NEURONTIN) 100 MG capsule         THREE  TIMES DAILY PRN       (R25.1) Tremor  Comment:    Plan: carbidopa-levodopa (SINEMET)  MG per         tablet                   Topic Date Due     DIABETIC EDUCATION Q1 YEAR  01/01/1936     EYE EXAM Q1 YEAR  09/11/2015     PHQ-9 Q1YR  01/09/2018               .  Current Outpatient Prescriptions   Medication Sig Dispense Refill     acetaminophen (MAPAP) 500 MG tablet TAKE 1 TABLET BY MOUTH EVERY 6 HOURS AS NEEDED 100 tablet 5     albuterol (PROAIR HFA/PROVENTIL HFA/VENTOLIN HFA) 108 (90 Base) MCG/ACT Inhaler Inhale 2 puffs into the lungs every 6 hours as needed for shortness of breath / dyspnea or wheezing 1 Inhaler 0     aspirin 81 MG tablet Take 1 tablet (81 mg) by mouth daily Correct ASA 30 tablet 11     atorvastatin (LIPITOR) 20 MG tablet Take 1 tablet (20 mg) by mouth daily 90 tablet 3     blood glucose (NO BRAND SPECIFIED) lancets standard Use to test blood sugar  dialy times daily or as directed. 1 Box 0     blood glucose calibration (NO BRAND SPECIFIED) solution Use to calibrate blood glucose monitor as directed. 1 each 11     blood glucose monitoring (NO BRAND SPECIFIED) meter device kit Use to test blood sugar  Twice daily  times daily or as directed. 1 kit 0     blood glucose monitoring (NO BRAND SPECIFIED) test strip Use to test blood sugars  Daily  times daily or as directed 100 strip 0     carbidopa-levodopa (SINEMET)  MG per tablet TAKE 1/2 TABLET BY MOUTH THREE TIMES DAILY 135 tablet 3     finasteride (PROSCAR) 5 MG tablet Take 1 tablet (5 mg) by mouth daily 90 tablet 3     gabapentin (NEURONTIN) 100 MG capsule Take 1 capsule (100 mg) by mouth 2 times daily 180 capsule 3     lidocaine (XYLOCAINE) 5 % ointment One application 4 x daily to affected areas lower back and knees if necessary 142 g 11     metoprolol succinate (TOPROL-XL) 50 MG 24 hr tablet Take 1 tablet (50 mg) by mouth daily 90 tablet 3     mupirocin (BACTROBAN) 2 % nasal ointment Spray 1 g into both nostrils 2 times daily 10 g 0      neomycin-polymyxin-hydrocortisone (CORTISPORIN) 3.5-64493-9 otic suspension Place 4 drops into both ears 4 times daily 10 mL 0     omeprazole (PRILOSEC) 20 MG CR capsule TAKE ONE CAPSULE BY MOUTH ONCE DAILY EVERY MORNING ONE-HALF HOUR BEFORE A MEAL 90 capsule 3     polyethylene glycol 0.4%- propylene glycol 0.3% (SYSTANE) 0.4-0.3 % SOLN ophthalmic solution Place 1 drop into both eyes 4 times daily as needed for dry eyes 1 Bottle 11     polyvinyl alcohol (ARTIFICIAL TEARS) 1.4 % ophthalmic solution Place 1 drop into both eyes as needed for dry eyes 15 mL [PRN     tamsulosin (FLOMAX) 0.4 MG capsule TAKE 1 CAPSULE BY MOUTH EVERY DAY 90 capsule 11     triamcinolone (KENALOG) 0.1 % cream Apply sparingly to affected area three times daily as needed 80 g 0     ACE/ARB NOT PRESCRIBED, INTENTIONAL, ACE & ARB not prescribed due to Intolerance and Other: hypokalemia x 2 episodes (Patient not taking: Reported on 2017)       aspirin-acetaminophen-caffeine (EXCEDRIN MIGRAINE) 250-250-65 MG per tablet Take 1 tablet by mouth every 6 hours as needed for headaches (Patient not taking: Reported on 2017) 80 tablet 1     vitamin D (ERGOCALCIFEROL) 24428 UNIT capsule TAKE 1 CAPSULE BY MOUTH EVERY WEEK 12 capsule 11              No Known Allergies      Immunization History   Administered Date(s) Administered     Influenza (High Dose) 3 valent vaccine 10/18/2013, 09/15/2015, 10/18/2016     Influenza (IIV3) PF 2007, 10/21/2008, 10/11/2014     Pneumo Conj 13-V (2010&after) 2017     Pneumococcal 23 valent 2007, 10/11/2014     Zoster vaccine, live 10/11/2014               reports that he does not drink alcohol.          reports that he does not use illicit drugs.        family history includes Unknown/Adopted in his father and mother.        indicated that his mother is . He indicated that his father is .          has a past surgical history that includes SURGICAL PATHOLOGY (2010); Eye  surgery (2003); and shoulder surgery.         reports that he does not engage in sexual activity.    .  Pediatric History   Patient Guardian Status     Not on file.     Other Topics Concern     Parent/Sibling W/ Cabg, Mi Or Angioplasty Before 65f 55m? No     unknown     Social History Narrative               reports that he has never smoked. He has never used smokeless tobacco.        Medical, social, surgical, and family histories reviewed.        Labs reviewed in EPIC  Patient Active Problem List   Diagnosis     Health Care Home     Dysuria     Essential and other specified forms of tremor     Abnormal involuntary movement     Lipoma     Cervical spondylosis without myelopathy     Degeneration of cervical intervertebral disc     Vitamin D deficiency     Memory loss     Obesity     Insomnia     Gastroesophageal reflux disease without esophagitis     Hyperlipidemia LDL goal <100     Pain in joint involving lower leg     Hypertension goal BP (blood pressure) < 140/80     Type 2 diabetes mellitus without complications (H)     Lumbago     Chronic pain of both knees     Headache     Nausea     UTI (urinary tract infection)     Lung nodule seen on imaging study     Bladder stones     ACP (advance care planning)     Hyponatremia     Tension headache       Past Surgical History:   Procedure Laterality Date     CL AFF SURGICAL PATHOLOGY  7/1/2010     EYE SURGERY  2003    retinal detatch     SHOULDER SURGERY           Social History   Substance Use Topics     Smoking status: Never Smoker     Smokeless tobacco: Never Used     Alcohol use No       Family History   Problem Relation Age of Onset     Unknown/Adopted Mother      Unknown/Adopted Father              Current Outpatient Prescriptions   Medication Sig Dispense Refill     acetaminophen (MAPAP) 500 MG tablet TAKE 1 TABLET BY MOUTH EVERY 6 HOURS AS NEEDED 100 tablet 5     albuterol (PROAIR HFA/PROVENTIL HFA/VENTOLIN HFA) 108 (90 Base) MCG/ACT Inhaler Inhale 2 puffs into  the lungs every 6 hours as needed for shortness of breath / dyspnea or wheezing 1 Inhaler 0     aspirin 81 MG tablet Take 1 tablet (81 mg) by mouth daily Correct ASA 30 tablet 11     atorvastatin (LIPITOR) 20 MG tablet Take 1 tablet (20 mg) by mouth daily 90 tablet 3     blood glucose (NO BRAND SPECIFIED) lancets standard Use to test blood sugar  dialy times daily or as directed. 1 Box 0     blood glucose calibration (NO BRAND SPECIFIED) solution Use to calibrate blood glucose monitor as directed. 1 each 11     blood glucose monitoring (NO BRAND SPECIFIED) meter device kit Use to test blood sugar  Twice daily  times daily or as directed. 1 kit 0     blood glucose monitoring (NO BRAND SPECIFIED) test strip Use to test blood sugars  Daily  times daily or as directed 100 strip 0     carbidopa-levodopa (SINEMET)  MG per tablet TAKE 1/2 TABLET BY MOUTH THREE TIMES DAILY 135 tablet 3     finasteride (PROSCAR) 5 MG tablet Take 1 tablet (5 mg) by mouth daily 90 tablet 3     gabapentin (NEURONTIN) 100 MG capsule Take 1 capsule (100 mg) by mouth 2 times daily 180 capsule 3     lidocaine (XYLOCAINE) 5 % ointment One application 4 x daily to affected areas lower back and knees if necessary 142 g 11     metoprolol succinate (TOPROL-XL) 50 MG 24 hr tablet Take 1 tablet (50 mg) by mouth daily 90 tablet 3     mupirocin (BACTROBAN) 2 % nasal ointment Spray 1 g into both nostrils 2 times daily 10 g 0     neomycin-polymyxin-hydrocortisone (CORTISPORIN) 3.5-10366-7 otic suspension Place 4 drops into both ears 4 times daily 10 mL 0     omeprazole (PRILOSEC) 20 MG CR capsule TAKE ONE CAPSULE BY MOUTH ONCE DAILY EVERY MORNING ONE-HALF HOUR BEFORE A MEAL 90 capsule 3     polyethylene glycol 0.4%- propylene glycol 0.3% (SYSTANE) 0.4-0.3 % SOLN ophthalmic solution Place 1 drop into both eyes 4 times daily as needed for dry eyes 1 Bottle 11     polyvinyl alcohol (ARTIFICIAL TEARS) 1.4 % ophthalmic solution Place 1 drop into both eyes as  needed for dry eyes 15 mL [PRN     tamsulosin (FLOMAX) 0.4 MG capsule TAKE 1 CAPSULE BY MOUTH EVERY DAY 90 capsule 11     triamcinolone (KENALOG) 0.1 % cream Apply sparingly to affected area three times daily as needed 80 g 0     ACE/ARB NOT PRESCRIBED, INTENTIONAL, ACE & ARB not prescribed due to Intolerance and Other: hypokalemia x 2 episodes (Patient not taking: Reported on 7/18/2017)       aspirin-acetaminophen-caffeine (EXCEDRIN MIGRAINE) 250-250-65 MG per tablet Take 1 tablet by mouth every 6 hours as needed for headaches (Patient not taking: Reported on 7/18/2017) 80 tablet 1     vitamin D (ERGOCALCIFEROL) 91231 UNIT capsule TAKE 1 CAPSULE BY MOUTH EVERY WEEK 12 capsule 11           Recent Labs   Lab Test  06/01/18   1551  07/18/17   1049  08/22/16   1427   06/16/15   1045  05/06/15   0727   05/05/15   0818   A1C  5.6  5.4  5.7   --   5.7   --    --    --    LDL   --   128*  147*   --    --   61   --    --    HDL   --   55  52   --    --   61   --    --    TRIG   --   123  144   --    --   75   --    --    ALT   --   22  19   --   11  26   --   26   CR   --   0.95  0.99   < >  0.90  0.81   < >  0.86   GFRESTIMATED   --   76  73   < >  82  >90  Non  GFR Calc     < >  87   GFRESTBLACK   --   >90   GFR Calc    88   < >  >90  >90  African American GFR Calc     < >  >90   GFR Calc     POTASSIUM   --   4.3  4.8   < >  4.3  3.6   < >  3.6   TSH   --   0.89   --    --    --    --    --   0.56    < > = values in this interval not displayed.            BP Readings from Last 6 Encounters:   06/01/18 126/74   01/25/18 118/64   07/18/17 112/62   01/09/17 118/62   12/12/16 118/64   10/18/16 122/64           Wt Readings from Last 3 Encounters:   06/01/18 153 lb (69.4 kg)   01/25/18 153 lb (69.4 kg)   07/18/17 153 lb (69.4 kg)                 Positive symptoms or findings indicated by bold designation:         ROS: 10 point ROS neg other than the symptoms noted above in the  "HPI.except  has Health Care Home; Dysuria; Essential and other specified forms of tremor; Abnormal involuntary movement; Lipoma; Cervical spondylosis without myelopathy; Degeneration of cervical intervertebral disc; Vitamin D deficiency; Memory loss; Obesity; Insomnia; Gastroesophageal reflux disease without esophagitis; Hyperlipidemia LDL goal <100; Pain in joint involving lower leg; Hypertension goal BP (blood pressure) < 140/80; Type 2 diabetes mellitus without complications (H); Lumbago; Chronic pain of both knees; Headache; Nausea; UTI (urinary tract infection); Lung nodule seen on imaging study; Bladder stones; ACP (advance care planning); Hyponatremia; and Tension headache on his problem list.  Review Of Systems    Skin: negative IMPROVED BLISTERING AND ECZEMA AND NEURODERMATITSI    Eyes: negative    Ears/Nose/Throat: negative    Respiratory: No shortness of breath, dyspnea on exertion, cough, or hemoptysis    Cardiovascular: negative    Gastrointestinal: heartburn    Genitourinary:  IMPROVED BENIGN PROSTATIC HYPERTROPHY SYMPTOMS     Musculoskeletal: back pain and arthritis    NECK PAIN HISTORY     Neurologic: TREMORS CONTROLED     Psychiatric: negative    Hematologic/Lymphatic/Immunologic: negative    Endocrine:  DIABETES 2 GOAL 8%     LDL OR \"BAD\" CHOLESTEROL  GOAL < 100     HYPERTENSION WITH GOAL OF LESS THAN 140/80             PE:  /74 (Cuff Size: Adult Regular)  Pulse 67  Temp 97.9  F (36.6  C) (Tympanic)  Resp 16  Wt 153 lb (69.4 kg)  SpO2 96%  BMI 26.26 kg/m2 Body mass index is 26.26 kg/(m^2).        Constitutional: general appearance, well nourished, well developed, in no acute distress, well developed, appears stated age, normal body habitus,          Eyes:; The patient has normal eyelids sclerae and conjunctivae :          Ears/Nose/Throat: external ear, overall: normal appearance; external nose, overall: benign appearance, normal moujth gums and lips           Neck: thyroid, overall: " normal size, normal consistency, nontender,          Respiratory:  palpation of chest, overall: normal excursion,    Clear to percussion and auscultation     NO Tachypnea    NORMAL  Color          Cardiovascular:  Good color with no peripheral edema    Regular sinus rhythm without murmur.  Physiologic heart sounds   Heart is unelarged    .     Chest/Breast: normal shape           Abdominal exam,  Liver and spleen are  unenlarged        Tenderness     Scars              Urogenital; no renal, flank or bladder  tenderness;          Lymphatic: neck nodes,     Other nodes         Musculoskeletal:  Brief ortho exam normal except:           Integument: inspection of skin, no rash, lesions; and, palpation, no induration, no tenderness.          Neurologic mental status, overall: alert and oriented; gait, no ataxia, no unsteadiness; coordination, no tremors; cranial nerves, overall: normal motor, overall: normal bulk, tone.          Psychiatric: orientation/consciousness, overall: oriented to person, place and time; behavior/psychomotor activity, no tics, normal psychomotor activity; mood and affect, overall: normal mood and affect; appearance, overall: well-groomed, good eye contact; speech, overall: normal quality, no aphasia and normal quality, quantity, intact.      NORMAL CIRCULATION MOTION AND SENSATION FEET  AND MONOFILAMENT TESTING       Diagnostic Test Results:  Results for orders placed or performed in visit on 06/01/18   HEMOGLOBIN A1C   Result Value Ref Range    Hemoglobin A1C 5.6 0 - 5.6 %           ICD-10-CM    1. Screening for diabetic peripheral neuropathy Z13.89 FOOT EXAM  NO CHARGE [54986.114]   2. Other chest pain R07.89 EKG 12-lead complete w/read - Clinics     Albumin Random Urine Quantitative with Creat Ratio   3. Type 2 diabetes mellitus without complication, without long-term current use of insulin (H) E11.9 HEMOGLOBIN A1C   4. Hyperlipidemia LDL goal <100 E78.5 Lipid panel reflex to direct LDL  Fasting     ALT     atorvastatin (LIPITOR) 20 MG tablet   5. Hypertension goal BP (blood pressure) < 140/80 I10 Basic metabolic panel   6. Nasal furuncle J34.0 mupirocin (BACTROBAN) 2 % nasal ointment   7. Wheezing R06.2 albuterol (PROAIR HFA/PROVENTIL HFA/VENTOLIN HFA) 108 (90 Base) MCG/ACT Inhaler   8. Gastroesophageal reflux disease without esophagitis K21.9 omeprazole (PRILOSEC) 20 MG CR capsule   9. Dry eyes, bilateral H04.123 polyethylene glycol 0.4%- propylene glycol 0.3% (SYSTANE) 0.4-0.3 % SOLN ophthalmic solution   10. Dry eyes H04.123 polyvinyl alcohol (ARTIFICIAL TEARS) 1.4 % ophthalmic solution   11. Dysuria R30.0 tamsulosin (FLOMAX) 0.4 MG capsule   12. Blister of leg, left, initial encounter S80.822A triamcinolone (KENALOG) 0.1 % cream   13. Vitamin D deficiency E55.9 DISCONTINUED: vitamin D (ERGOCALCIFEROL) 51924 UNIT capsule   14. Otalgia of left ear H92.02 neomycin-polymyxin-hydrocortisone (CORTISPORIN) 3.5-44300-2 otic suspension   15. Essential hypertension, benign I10 metoprolol succinate (TOPROL-XL) 50 MG 24 hr tablet   16. Chronic low back pain with sciatica, sciatica laterality unspecified, unspecified back pain laterality M54.40 acetaminophen (MAPAP) 500 MG tablet    G89.29    17. Acute pain of right knee M25.561 acetaminophen (MAPAP) 500 MG tablet   18. Chronic right-sided low back pain with sciatica, sciatica laterality unspecified M54.40 lidocaine (XYLOCAINE) 5 % ointment    G89.29    19. Cervicalgia M54.2 gabapentin (NEURONTIN) 100 MG capsule   20. Hesitancy of micturition R39.11 finasteride (PROSCAR) 5 MG tablet   21. Tremor R25.1 carbidopa-levodopa (SINEMET)  MG per tablet              .    Side effects benefits and risks thoroughly discussed. .he may come in early if unimproved or getting worse          Please drink 2 glasses of water prior to meals and walk 15-30 minutes after meals        I spent  25 MINUTES SPENT  with patient discussing the following issues   The primary  encounter diagnosis was Screening for diabetic peripheral neuropathy. Diagnoses of Other chest pain, Type 2 diabetes mellitus without complication, without long-term current use of insulin (H), Hyperlipidemia LDL goal <100, Hypertension goal BP (blood pressure) < 140/80, Nasal furuncle, Wheezing, Gastroesophageal reflux disease without esophagitis, Dry eyes, bilateral, Dry eyes, Dysuria, Blister of leg, left, initial encounter, Vitamin D deficiency, Otalgia of left ear, Essential hypertension, benign, Chronic low back pain with sciatica, sciatica laterality unspecified, unspecified back pain laterality, Acute pain of right knee, Chronic right-sided low back pain with sciatica, sciatica laterality unspecified, Cervicalgia, Hesitancy of micturition, and Tremor were also pertinent to this visit. over half of which involved counseling and coordination of care.      Patient Instructions   (Z13.89) Screening for diabetic peripheral neuropathy  (primary encounter diagnosis)  Comment:    Plan: FOOT EXAM  NO CHARGE [74142.114]             (R07.89) Other chest pain  Comment:    Plan: EKG 12-lead complete w/read - Clinics, Albumin         Random Urine Quantitative with Creat Ratio             (E11.9) Type 2 diabetes mellitus without complication, without long-term current use of insulin (H)  Comment:    Plan: HEMOGLOBIN A1C             (E78.5) Hyperlipidemia LDL goal <100  Comment:    Plan: Lipid panel reflex to direct LDL Fasting, ALT,         atorvastatin (LIPITOR) 20 MG tablet             (I10) Hypertension goal BP (blood pressure) < 140/80  Comment:    Plan: Basic metabolic panel             (J34.0) Nasal furuncle  Comment:    Plan: mupirocin (BACTROBAN) 2 % nasal ointment             (R06.2) Wheezing  Comment:    Plan: albuterol (PROAIR HFA/PROVENTIL HFA/VENTOLIN         HFA) 108 (90 Base) MCG/ACT Inhaler             (K21.9) Gastroesophageal reflux disease without esophagitis  Comment:    Plan: omeprazole (PRILOSEC) 20 MG  CR capsule             (H04.123) Dry eyes, bilateral  Comment:    Plan: polyethylene glycol 0.4%- propylene glycol 0.3%        (SYSTANE) 0.4-0.3 % SOLN ophthalmic solution             (H04.123) Dry eyes  Comment:    Plan: polyvinyl alcohol (ARTIFICIAL TEARS) 1.4 %         ophthalmic solution             (R30.0) Dysuria  Comment:    Plan: tamsulosin (FLOMAX) 0.4 MG capsule             (S80.822A) Blister of leg, left, initial encounter  Comment:    Plan: triamcinolone (KENALOG) 0.1 % cream             (E55.9) Vitamin D deficiency  Comment:    Plan: vitamin D (ERGOCALCIFEROL) 38268 UNIT capsule             (H92.02) Otalgia of left ear  Comment:    Plan: neomycin-polymyxin-hydrocortisone (CORTISPORIN)        3.5-93613-4 otic suspension             (I10) Essential hypertension, benign  Comment:    Plan: metoprolol succinate (TOPROL-XL) 50 MG 24 hr         tablet             (M54.40,  G89.29) Chronic low back pain with sciatica, sciatica laterality unspecified, unspecified back pain laterality  Comment:    Plan: acetaminophen (MAPAP) 500 MG tablet             (M25.561) Acute pain of right knee  Comment:    Plan: acetaminophen (MAPAP) 500 MG tablet             (M54.40,  G89.29) Chronic right-sided low back pain with sciatica, sciatica laterality unspecified  Comment:    Plan: lidocaine (XYLOCAINE) 5 % ointment             (M54.2) Cervicalgia  Comment:    Plan: gabapentin (NEURONTIN) 100 MG capsule             (R39.11) Hesitancy of micturition  Comment:     Plan: finasteride (PROSCAR) 5 MG tablet                  (R25.1) Tremor  Comment:    Plan: carbidopa-levodopa (SINEMET)  MG per         tablet             \            ALL THE ABOVE PROBLEMS ARE STABLE AND MED CHANGES AS NOTED        Diet: MEDITERRANEAN DIET AND DIABETES         Exercise:  NECK AND LOWER BACK PAIN AND OSTEOARTHRITIS KNEE PAIN   Exercises Range of motion, balance, isometric, and strengthening exercises 30 repetitions twice daily of involved  joints            .AMY BURNS MD 6/1/2018 7:25 AM  Annette 3, 2018  E

## 2018-06-01 NOTE — MR AVS SNAPSHOT
After Visit Summary   6/1/2018    Asha Sanford    MRN: [id number]           Patient Information     Date Of Birth          1935        Visit Information        Provider Department      6/1/2018 3:00 PM Edwardo Moser MD; [employer] [facility]        Today's Diagnoses     Screening for diabetic peripheral neuropathy    -  1    Other chest pain        Type 2 diabetes mellitus without complication, without long-term current use of insulin (H)        Hyperlipidemia LDL goal <100        Hypertension goal BP (blood pressure) < 140/80        Nasal furuncle        Wheezing        Gastroesophageal reflux disease without esophagitis        Dry eyes, bilateral        Dry eyes        Dysuria        Blister of leg, left, initial encounter        Vitamin D deficiency        Otalgia of left ear        Essential hypertension, benign        Chronic low back pain with sciatica, sciatica laterality unspecified, unspecified back pain laterality        Acute pain of right knee        Chronic right-sided low back pain with sciatica, sciatica laterality unspecified        Cervicalgia        Hesitancy of micturition        Tremor          Care Instructions    (Z13.89) Screening for diabetic peripheral neuropathy  (primary encounter diagnosis)  Comment:    Plan: FOOT EXAM  NO CHARGE [19439.114]             (R07.89) Other chest pain  Comment:    Plan: EKG 12-lead complete w/read - Clinics, Albumin         Random Urine Quantitative with Creat Ratio             (E11.9) Type 2 diabetes mellitus without complication, without long-term current use of insulin (H)  Comment:    Plan: HEMOGLOBIN A1C             (E78.5) Hyperlipidemia LDL goal <100  Comment:    Plan: Lipid panel reflex to direct LDL Fasting, ALT,         atorvastatin (LIPITOR) 20 MG tablet             (I10) Hypertension goal BP (blood pressure) < 140/80  Comment:    Plan: Basic metabolic panel              (J34.0) Nasal furuncle  Comment:    Plan: mupirocin (BACTROBAN) 2 % nasal ointment             (R06.2) Wheezing  Comment:    Plan: albuterol (PROAIR HFA/PROVENTIL HFA/VENTOLIN         HFA) 108 (90 Base) MCG/ACT Inhaler             (K21.9) Gastroesophageal reflux disease without esophagitis  Comment:    Plan: omeprazole (PRILOSEC) 20 MG CR capsule             (H04.123) Dry eyes, bilateral  Comment:    Plan: polyethylene glycol 0.4%- propylene glycol 0.3%        (SYSTANE) 0.4-0.3 % SOLN ophthalmic solution             (H04.123) Dry eyes  Comment:    Plan: polyvinyl alcohol (ARTIFICIAL TEARS) 1.4 %         ophthalmic solution             (R30.0) Dysuria  Comment:    Plan: tamsulosin (FLOMAX) 0.4 MG capsule             (S80.822A) Blister of leg, left, initial encounter  Comment:    Plan: triamcinolone (KENALOG) 0.1 % cream             (E55.9) Vitamin D deficiency  Comment:    Plan: vitamin D (ERGOCALCIFEROL) 60514 UNIT capsule             (H92.02) Otalgia of left ear  Comment:    Plan: neomycin-polymyxin-hydrocortisone (CORTISPORIN)        3.5-32162-8 otic suspension             (I10) Essential hypertension, benign  Comment:    Plan: metoprolol succinate (TOPROL-XL) 50 MG 24 hr         tablet             (M54.40,  G89.29) Chronic low back pain with sciatica, sciatica laterality unspecified, unspecified back pain laterality  Comment:    Plan: acetaminophen (MAPAP) 500 MG tablet             (M25.561) Acute pain of right knee  Comment:    Plan: acetaminophen (MAPAP) 500 MG tablet             (M54.40,  G89.29) Chronic right-sided low back pain with sciatica, sciatica laterality unspecified  Comment:    Plan: lidocaine (XYLOCAINE) 5 % ointment             (M54.2) Cervicalgia  Comment:    Plan: gabapentin (NEURONTIN) 100 MG capsule             (R39.11) Hesitancy of micturition  Comment:     Plan: finasteride (PROSCAR) 5 MG tablet                  (R25.1) Tremor  Comment:    Plan: carbidopa-levodopa (SINEMET)  MG  per         tablet             \          Follow-ups after your visit        Follow-up notes from your care team     Return in about 6 months (around 12/1/2018) for Physical Exam.      Who to contact     If you have questions or need follow up information about today's clinic visit or your schedule please contact [facility] directly at [phone].  Normal or non-critical lab and imaging results will be communicated to you by MyChart, letter or phone within 4 business days after the clinic has received the results. If you do not hear from us within 7 days, please contact the clinic through MyChart or phone. If you have a critical or abnormal lab result, we will notify you by phone as soon as possible.  Submit refill requests through Caregivers or call your pharmacy and they will forward the refill request to us. Please allow 3 business days for your refill to be completed.          Additional Information About Your Visit        Care EveryWhere ID     This is your Care EveryWhere ID. This could be used by other organizations to access your [facility] medical records  [id number]        Your Vitals Were     Pulse Temperature Respirations Pulse Oximetry BMI (Body Mass Index)       67 97.9  F (36.6  C) (Tympanic) 16 96% 26.26 kg/m2        Blood Pressure from Last 3 Encounters:   06/01/18 126/74   01/25/18 118/64   07/18/17 112/62    Weight from Last 3 Encounters:   06/01/18 153 lb (69.4 kg)   01/25/18 153 lb (69.4 kg)   07/18/17 153 lb (69.4 kg)              We Performed the Following     Albumin Random Urine Quantitative with Creat Ratio     ALT     Basic metabolic panel     EKG 12-lead complete w/read - Clinics     FOOT EXAM  NO CHARGE [45242.114]     HEMOGLOBIN A1C     Lipid panel reflex to direct LDL Fasting          Today's Medication Changes          These changes are accurate as of 6/1/18  4:00 PM.  If you have any questions, ask your nurse or doctor.               Start taking  these medicines.        Dose/Directions    mupirocin 2 % nasal ointment   Commonly known as:  BACTROBAN   Used for:  Nasal furuncle   Started by:  Edwardo Moser MD        Dose:  1 g   Spray 1 g into both nostrils 2 times daily   Quantity:  10 g   Refills:  0         These medicines have changed or have updated prescriptions.        Dose/Directions    atorvastatin 20 MG tablet   Commonly known as:  LIPITOR   This may have changed:    - medication strength  - how much to take   Used for:  Hyperlipidemia LDL goal <100   Changed by:  Edwardo Moser MD        Dose:  20 mg   Take 1 tablet (20 mg) by mouth daily   Quantity:  90 tablet   Refills:  3       metoprolol succinate 50 MG 24 hr tablet   Commonly known as:  TOPROL-XL   This may have changed:  See the new instructions.   Used for:  Essential hypertension, benign   Changed by:  Edwardo Moser MD        Dose:  50 mg   Take 1 tablet (50 mg) by mouth daily   Quantity:  90 tablet   Refills:  3       neomycin-polymyxin-hydrocortisone 3.5-55767-5 otic suspension   Commonly known as:  CORTISPORIN   This may have changed:  how to take this   Used for:  Otalgia of left ear   Changed by:  Edwardo Moser MD        Dose:  4 drop   Place 4 drops into both ears 4 times daily   Quantity:  10 mL   Refills:  0            Where to get your medicines      These medications were sent to [facility] [address] [place] [address] [facility] & [address] [address]     Phone:  [phone]     acetaminophen 500 MG tablet    albuterol 108 (90 Base) MCG/ACT Inhaler    atorvastatin 20 MG tablet    carbidopa-levodopa  MG per tablet    finasteride 5 MG tablet    gabapentin 100 MG capsule    lidocaine 5 % ointment    metoprolol succinate 50 MG 24 hr tablet    mupirocin 2 % nasal ointment    neomycin-polymyxin-hydrocortisone 3.5-02967-8 otic suspension    omeprazole 20 MG CR capsule     polyethylene glycol 0.4%- propylene glycol 0.3% 0.4-0.3 % Soln ophthalmic solution    tamsulosin 0.4 MG capsule    triamcinolone 0.1 % cream    vitamin D 29872 UNIT capsule         Some of these will need a paper prescription and others can be bought over the counter.  Ask your nurse if you have questions.     Bring a paper prescription for each of these medications     polyvinyl alcohol 1.4 % ophthalmic solution                Primary Care Provider Office Phone # Fax #    Edwardo Linda Moser [phone] [address] [facility] [address]        Equal Access to Services     [facility] HospitalZENAIDA : Hadii aad ku hadasho Soomaali, waaxda luqadaha, qaybta kaalmada adeegyada, waxdillan barker . So [facility] [phone].    ATENCIÓN: Si habla español, tiene a norton disposición servicios gratuitos de asistencia lingüística. Llame al [phone].    We comply with applicable federal civil rights laws and [place] laws. We do not discriminate on the basis of race, color, national origin, age, disability, sex, sexual orientation, or gender identity.            Thank you!     Thank you for choosing [facility]  for your care. Our goal is always to provide you with excellent care. Hearing back from our patients is one way we can continue to improve our services. Please take a few minutes to complete the written survey that you may receive in the mail after your visit with us. Thank you!             Your Updated Medication List - Protect others around you: Learn how to safely use, store and throw away your medicines at www.disposemymeds.org.          This list is accurate as of 6/1/18  4:00 PM.  Always use your most recent med list.                   Brand Name Dispense Instructions for use Diagnosis    ACE/ARB/ARNI NOT PRESCRIBED (INTENTIONAL)      ACE & ARB not prescribed due to Intolerance and Other: hypokalemia x 2 episodes    Type 2 diabetes  mellitus without complications (H), Hyponatremia       acetaminophen 500 MG tablet    MAPAP    100 tablet    TAKE 1 TABLET BY MOUTH EVERY 6 HOURS AS NEEDED    Chronic low back pain with sciatica, sciatica laterality unspecified, unspecified back pain laterality, Acute pain of right knee       albuterol 108 (90 Base) MCG/ACT Inhaler    PROAIR HFA/PROVENTIL HFA/VENTOLIN HFA    1 Inhaler    Inhale 2 puffs into the lungs every 6 hours as needed for shortness of breath / dyspnea or wheezing    Wheezing       aspirin 81 MG tablet     30 tablet    Take 1 tablet (81 mg) by mouth daily Correct ASA    Hyperlipidemia LDL goal <100, Hypertension goal BP (blood pressure) < 140/80       aspirin-acetaminophen-caffeine 250-250-65 MG per tablet    EXCEDRIN MIGRAINE    80 tablet    Take 1 tablet by mouth every 6 hours as needed for headaches    Headache(784.0), Cervicalgia       atorvastatin 20 MG tablet    LIPITOR    90 tablet    Take 1 tablet (20 mg) by mouth daily    Hyperlipidemia LDL goal <100       blood glucose calibration solution    no brand specified    1 each    Use to calibrate blood glucose monitor as directed.    Type 2 diabetes mellitus without complication, without long-term current use of insulin (H)       blood glucose lancets standard    no brand specified    1 Box    Use to test blood sugar  dialy times daily or as directed.    Type 2 diabetes mellitus without complication, without long-term current use of insulin (H)       blood glucose monitoring meter device kit    no brand specified    1 kit    Use to test blood sugar  Twice daily  times daily or as directed.    Type 2 diabetes mellitus without complication, without long-term current use of insulin (H)       blood glucose monitoring test strip    no brand specified    100 strip    Use to test blood sugars  Daily  times daily or as directed    Type 2 diabetes mellitus without complication, without long-term current use of insulin (H)       carbidopa-levodopa   MG per tablet    SINEMET    135 tablet    TAKE 1/2 TABLET BY MOUTH THREE TIMES DAILY    Tremor       finasteride 5 MG tablet    PROSCAR    90 tablet    Take 1 tablet (5 mg) by mouth daily    Hesitancy of micturition       gabapentin 100 MG capsule    NEURONTIN    180 capsule    Take 1 capsule (100 mg) by mouth 2 times daily    Cervicalgia       lidocaine 5 % ointment    XYLOCAINE    142 g    One application 4 x daily to affected areas lower back and knees if necessary    Chronic right-sided low back pain with sciatica, sciatica laterality unspecified       metoprolol succinate 50 MG 24 hr tablet    TOPROL-XL    90 tablet    Take 1 tablet (50 mg) by mouth daily    Essential hypertension, benign       mupirocin 2 % nasal ointment    BACTROBAN    10 g    Spray 1 g into both nostrils 2 times daily    Nasal furuncle       neomycin-polymyxin-hydrocortisone 3.5-25496-8 otic suspension    CORTISPORIN    10 mL    Place 4 drops into both ears 4 times daily    Otalgia of left ear       omeprazole 20 MG CR capsule    priLOSEC    90 capsule    TAKE ONE CAPSULE BY MOUTH ONCE DAILY EVERY MORNING ONE-HALF HOUR BEFORE A MEAL    Gastroesophageal reflux disease without esophagitis       polyethylene glycol 0.4%- propylene glycol 0.3% 0.4-0.3 % Soln ophthalmic solution    SYSTANE    1 Bottle    Place 1 drop into both eyes 4 times daily as needed for dry eyes    Dry eyes, bilateral       polyvinyl alcohol 1.4 % ophthalmic solution    ARTIFICIAL TEARS    15 mL    Place 1 drop into both eyes as needed for dry eyes    Dry eyes       tamsulosin 0.4 MG capsule    FLOMAX    90 capsule    TAKE 1 CAPSULE BY MOUTH EVERY DAY    Dysuria       triamcinolone 0.1 % cream    KENALOG    80 g    Apply sparingly to affected area three times daily as needed    Blister of leg, left, initial encounter       vitamin D 08434 UNIT capsule    ERGOCALCIFEROL    4 capsule    TAKE 1 CAPSULE BY MOUTH EVERY WEEK    Vitamin D deficiency

## 2018-06-02 ENCOUNTER — TELEPHONE (OUTPATIENT)
Dept: FAMILY MEDICINE | Facility: CLINIC | Age: 83
End: 2018-06-02

## 2018-06-02 NOTE — TELEPHONE ENCOUNTER
MESSAGE: DRUG NOT COVERED BY PATIENT PLAN. THE PREFERRED ALTERNATIVE IS PROAIRHFA, PROAIRRESP. PLEASE CALL/FAX PHARMACY TO CHANGE MEDICATION ALONG WITH STRENGTH, DIRECTIONS, QUANTITY AND REFILLS.

## 2018-06-03 RX ORDER — ALBUTEROL SULFATE 90 UG/1
2 AEROSOL, METERED RESPIRATORY (INHALATION) EVERY 6 HOURS PRN
Qty: 1 INHALER | Refills: 0 | Status: SHIPPED | OUTPATIENT
Start: 2018-06-03 | End: 2018-09-18

## 2018-06-04 LAB
ALT SERPL W P-5'-P-CCNC: 26 U/L (ref 0–70)
ANION GAP SERPL CALCULATED.3IONS-SCNC: 9 MMOL/L (ref 3–14)
BUN SERPL-MCNC: 15 MG/DL (ref 7–30)
CALCIUM SERPL-MCNC: 9.4 MG/DL (ref 8.5–10.1)
CHLORIDE SERPL-SCNC: 103 MMOL/L (ref 94–109)
CHOLEST SERPL-MCNC: 237 MG/DL
CO2 SERPL-SCNC: 28 MMOL/L (ref 20–32)
CREAT SERPL-MCNC: 1.02 MG/DL (ref 0.66–1.25)
CREAT UR-MCNC: 111 MG/DL
GFR SERPL CREATININE-BSD FRML MDRD: 70 ML/MIN/1.7M2
GLUCOSE SERPL-MCNC: 72 MG/DL (ref 70–99)
HDLC SERPL-MCNC: 45 MG/DL
LDLC SERPL CALC-MCNC: 156 MG/DL
MICROALBUMIN UR-MCNC: 6 MG/L
MICROALBUMIN/CREAT UR: 5.75 MG/G CR (ref 0–17)
NONHDLC SERPL-MCNC: 192 MG/DL
POTASSIUM SERPL-SCNC: 4.3 MMOL/L (ref 3.4–5.3)
SODIUM SERPL-SCNC: 140 MMOL/L (ref 133–144)
TRIGL SERPL-MCNC: 180 MG/DL

## 2018-06-04 NOTE — TELEPHONE ENCOUNTER
proair hfa already ordered     As an alternative     Also green checked in [facility]     Edwardo Moser Jr., MD

## 2018-09-04 ENCOUNTER — PATIENT OUTREACH (OUTPATIENT)
Dept: GERIATRIC MEDICINE | Facility: CLINIC | Age: 83
End: 2018-09-04

## 2018-09-04 ASSESSMENT — ACTIVITIES OF DAILY LIVING (ADL)
DEPENDENT_IADLS:: CLEANING;COOKING;LAUNDRY;SHOPPING;MEAL PREPARATION;MEDICATION MANAGEMENT;MONEY MANAGEMENT;TRANSPORTATION

## 2018-09-04 NOTE — PROGRESS NOTES
[facility] Care Coordination Contact    [facility] Home Visit Assessment     Home visit for Health Risk Assessment with Asha Sanford completed on September 4, 2018    Type of residence:: Apartment - handicap accessible  Current living arrangement:: I live alone     Assessment completed with:: Patient    Current Care Plan  Member currently receiving the following home care services:     Member currently receiving the following community resources: PCA, Lifeline, Day Care, Transportation Services      Medication Review  Medication reconciliation completed in Epic: Yes  Medication set-up & administration: Independent-does not set up.  Self-administers medications.  Medication understanding concerns (by member, family or CC): No  Medication adherence concerns (by member, family or CC): No    Mental/Behavioral Health   Depression Screening: See PHQ assessment flowsheet.   Mental health DX:: No      No current MH services-will place referral for N/A    Falls Assessment:   Fallen 2 or more times in the past year?: No   Any fall with injury in the past year?: No    ADL/IADL Dependencies:   Dependent ADLs:: Grooming, Dressing, Bathing, Transfers, Ambulation-cane  Dependent IADLs:: Cleaning, Cooking, Laundry, Shopping, Meal Preparation, Medication Management, Money Management, Transportation    [facility] Health Plan sponsored benefits: Shared information re: Silver Sneakers/gym memberships, ASA, Calcium +D.    PCA Assessment completed at visit: Yes     Elderly Waiver Eligibility: Yes-will continue on EW    Care Plan & Recommendations:  Continue current services.     See CC for detailed assessment information.    Follow-Up Plan: Member informed of future contact, plan to f/u with member with a 6 month telephone assessment.  Contact information shared with member and family, encouraged member to call with any questions or concerns at any time.    [place] care continuum providers: Please refer to Health Care  Home on the Epic Problem List to view this patient's [facility] Care Plan Summary.    Michelle Melchor RN BSN PHN  [facility] Care Coordinator  [phone]  Fax:[phone]

## 2018-09-13 ENCOUNTER — PATIENT OUTREACH (OUTPATIENT)
Dept: GERIATRIC MEDICINE | Facility: CLINIC | Age: 83
End: 2018-09-13

## 2018-09-13 NOTE — LETTER
September 13, 2018    ASHA ALEMAN  [address]  [place] [address]    Dear Asha,     At [facility], we re dedicated to improving the health and well-being of our members.  Enclosed you will find the Comprehensive Care Plan that was developed with you on 9/4/18. Please review the Care Plan carefully.    As a reminder, some of the things we discussed at your visit include:    Ways to improve or maintain your physical health such as walking 20 minutes a day.     Ways to reduce the risk of falls.     Health care needs you may have.     Don t forget to contact your care coordinator if you:    Have been hospitalized or plan to be hospitalized.     Have experienced a fall.      Have experienced a change in physical health, which may include bladder control and pain issues.      Are experiencing emotional problems.     If you do not agree with your Care Plan, have questions about it, or have experienced a change in your needs, please contact me, your care coordinator, at [phone]. If you are hearing impaired, please call the [place] Relay at 677 or [phone] ([id number] relay service).    Sincerely,      Michelle Melchor RN, [facility]     [facility] is a health plan that contracts with both Medicare and the [place] Medical Assistance (Medicaid) program to provide benefits of both programs to enrollees. Enrollment in [facility] depends on contract renewal.    [employer] [id number] IA ([id number])                                                  (12/16)

## 2018-09-13 NOTE — PROGRESS NOTES
[facility] Care Coordination Contact  Received after visit chart from care coordinator.  Completed following tasks: Mailed copy of care plan to client, Updated services in access, Submitted referrals/auths for ADC and Entered MMIS   [facility]:  Emailed completed PCA assessment to [facility].  Faxed copy of PCA assessment to PCA Agency and mailed copy to member.  Faxed MD Communication to PCP.   Provider Signature - No POC Shared:  Member indicates that they do not want their POC shared with any EW providers.  Chart was returned to ANITA.   Allyson Henry  Case Management Specialist  [facility]  [phone]

## 2018-09-18 ENCOUNTER — OFFICE VISIT (OUTPATIENT)
Dept: FAMILY MEDICINE | Facility: CLINIC | Age: 83
End: 2018-09-18
Payer: COMMERCIAL

## 2018-09-18 VITALS
BODY MASS INDEX: 26.95 KG/M2 | OXYGEN SATURATION: 96 % | HEART RATE: 69 BPM | TEMPERATURE: 96.6 F | DIASTOLIC BLOOD PRESSURE: 70 MMHG | SYSTOLIC BLOOD PRESSURE: 124 MMHG | WEIGHT: 157 LBS | RESPIRATION RATE: 16 BRPM

## 2018-09-18 DIAGNOSIS — G89.29 CHRONIC RIGHT-SIDED LOW BACK PAIN WITH SCIATICA, SCIATICA LATERALITY UNSPECIFIED: ICD-10-CM

## 2018-09-18 DIAGNOSIS — S80.822A: ICD-10-CM

## 2018-09-18 DIAGNOSIS — R06.2 WHEEZING: ICD-10-CM

## 2018-09-18 DIAGNOSIS — E78.5 HYPERLIPIDEMIA LDL GOAL <100: Chronic | ICD-10-CM

## 2018-09-18 DIAGNOSIS — H04.123 DRY EYES: ICD-10-CM

## 2018-09-18 DIAGNOSIS — H04.123 DRY EYES, BILATERAL: ICD-10-CM

## 2018-09-18 DIAGNOSIS — R30.0 DYSURIA: ICD-10-CM

## 2018-09-18 DIAGNOSIS — M54.40 CHRONIC RIGHT-SIDED LOW BACK PAIN WITH SCIATICA, SCIATICA LATERALITY UNSPECIFIED: ICD-10-CM

## 2018-09-18 DIAGNOSIS — Z23 NEED FOR INFLUENZA VACCINATION: ICD-10-CM

## 2018-09-18 DIAGNOSIS — H92.02 OTALGIA OF LEFT EAR: ICD-10-CM

## 2018-09-18 DIAGNOSIS — R39.11 HESITANCY OF MICTURITION: ICD-10-CM

## 2018-09-18 DIAGNOSIS — I10 HYPERTENSION GOAL BP (BLOOD PRESSURE) < 140/80: Chronic | ICD-10-CM

## 2018-09-18 DIAGNOSIS — E11.9 TYPE 2 DIABETES MELLITUS WITHOUT COMPLICATION, WITHOUT LONG-TERM CURRENT USE OF INSULIN (H): Chronic | ICD-10-CM

## 2018-09-18 DIAGNOSIS — G89.29 CHRONIC LOW BACK PAIN WITH SCIATICA, SCIATICA LATERALITY UNSPECIFIED, UNSPECIFIED BACK PAIN LATERALITY: Primary | Chronic | ICD-10-CM

## 2018-09-18 DIAGNOSIS — J34.0 NASAL FURUNCLE: ICD-10-CM

## 2018-09-18 DIAGNOSIS — M54.2 CERVICALGIA: ICD-10-CM

## 2018-09-18 DIAGNOSIS — M54.40 CHRONIC LOW BACK PAIN WITH SCIATICA, SCIATICA LATERALITY UNSPECIFIED, UNSPECIFIED BACK PAIN LATERALITY: Primary | Chronic | ICD-10-CM

## 2018-09-18 DIAGNOSIS — R25.1 TREMOR: ICD-10-CM

## 2018-09-18 DIAGNOSIS — I10 ESSENTIAL HYPERTENSION, BENIGN: ICD-10-CM

## 2018-09-18 DIAGNOSIS — K21.9 GASTROESOPHAGEAL REFLUX DISEASE WITHOUT ESOPHAGITIS: Chronic | ICD-10-CM

## 2018-09-18 DIAGNOSIS — E55.9 VITAMIN D DEFICIENCY: ICD-10-CM

## 2018-09-18 DIAGNOSIS — M25.561 ACUTE PAIN OF RIGHT KNEE: Chronic | ICD-10-CM

## 2018-09-18 LAB — HBA1C MFR BLD: 5.4 % (ref 0–5.6)

## 2018-09-18 PROCEDURE — 90686 IIV4 VACC NO PRSV 0.5 ML IM: CPT | Performed by: FAMILY MEDICINE

## 2018-09-18 PROCEDURE — 36416 COLLJ CAPILLARY BLOOD SPEC: CPT | Performed by: FAMILY MEDICINE

## 2018-09-18 PROCEDURE — G0008 ADMIN INFLUENZA VIRUS VAC: HCPCS | Performed by: FAMILY MEDICINE

## 2018-09-18 PROCEDURE — 83036 HEMOGLOBIN GLYCOSYLATED A1C: CPT | Performed by: FAMILY MEDICINE

## 2018-09-18 PROCEDURE — 99214 OFFICE O/P EST MOD 30 MIN: CPT | Mod: 25 | Performed by: FAMILY MEDICINE

## 2018-09-18 RX ORDER — POLYVINYL ALCOHOL 14 MG/ML
1 SOLUTION/ DROPS OPHTHALMIC PRN
Qty: 15 ML | Refills: 11 | Status: SHIPPED | OUTPATIENT
Start: 2018-09-18

## 2018-09-18 RX ORDER — FINASTERIDE 5 MG/1
5 TABLET, FILM COATED ORAL DAILY
Qty: 90 TABLET | Refills: 11 | Status: SHIPPED | OUTPATIENT
Start: 2018-09-18 | End: 2019-10-08

## 2018-09-18 RX ORDER — GABAPENTIN 100 MG/1
100 CAPSULE ORAL 2 TIMES DAILY
Qty: 180 CAPSULE | Refills: 11 | Status: SHIPPED | OUTPATIENT
Start: 2018-09-18 | End: 2019-10-08

## 2018-09-18 RX ORDER — METOPROLOL SUCCINATE 50 MG/1
50 TABLET, EXTENDED RELEASE ORAL DAILY
Qty: 90 TABLET | Refills: 3 | Status: SHIPPED | OUTPATIENT
Start: 2018-09-18 | End: 2019-07-05

## 2018-09-18 RX ORDER — ACETAMINOPHEN 500 MG
TABLET ORAL
Qty: 100 TABLET | Refills: 11 | Status: SHIPPED | OUTPATIENT
Start: 2018-09-18 | End: 2019-06-05

## 2018-09-18 RX ORDER — ATORVASTATIN CALCIUM 20 MG/1
20 TABLET, FILM COATED ORAL DAILY
Qty: 90 TABLET | Refills: 3 | Status: SHIPPED | OUTPATIENT
Start: 2018-09-18 | End: 2019-10-08

## 2018-09-18 RX ORDER — LIDOCAINE 50 MG/G
OINTMENT TOPICAL
Qty: 142 G | Refills: 11 | Status: SHIPPED | OUTPATIENT
Start: 2018-09-18 | End: 2019-10-08

## 2018-09-18 RX ORDER — NEOMYCIN SULFATE, POLYMYXIN B SULFATE AND HYDROCORTISONE 10; 3.5; 1 MG/ML; MG/ML; [USP'U]/ML
4 SUSPENSION/ DROPS AURICULAR (OTIC) 4 TIMES DAILY
Qty: 10 ML | Refills: 3 | Status: SHIPPED | OUTPATIENT
Start: 2018-09-18 | End: 2019-10-08

## 2018-09-18 RX ORDER — TAMSULOSIN HYDROCHLORIDE 0.4 MG/1
CAPSULE ORAL
Qty: 90 CAPSULE | Refills: 11 | Status: SHIPPED | OUTPATIENT
Start: 2018-09-18 | End: 2019-10-08

## 2018-09-18 RX ORDER — CARBIDOPA AND LEVODOPA 25; 100 MG/1; MG/1
TABLET ORAL
Qty: 135 TABLET | Refills: 3 | Status: SHIPPED | OUTPATIENT
Start: 2018-09-18 | End: 2019-06-05

## 2018-09-18 RX ORDER — ALBUTEROL SULFATE 90 UG/1
2 AEROSOL, METERED RESPIRATORY (INHALATION) EVERY 6 HOURS PRN
Qty: 1 INHALER | Refills: 0 | Status: SHIPPED | OUTPATIENT
Start: 2018-09-18 | End: 2019-10-08

## 2018-09-18 RX ORDER — TRIAMCINOLONE ACETONIDE 1 MG/G
CREAM TOPICAL
Qty: 80 G | Refills: 0 | Status: SHIPPED | OUTPATIENT
Start: 2018-09-18 | End: 2019-10-08

## 2018-09-18 NOTE — LETTER
September 19, 2018      Asha Sanford  [address]  [place] [address]        Dear ,    We are writing to inform you of your test results.    NORMAL THREE MONTH GLUCOSE AVERAGE   IN NORMAL RANGE!     Resulted Orders   Hemoglobin A1c   Result Value Ref Range    Hemoglobin A1C 5.4 0 - 5.6 %      Comment:      Normal <5.7% Prediabetes 5.7-6.4%  Diabetes 6.5% or higher - adopted from ADA   consensus guidelines.         If you have any questions or concerns, please call the clinic at the number listed above.       Sincerely,        AMY BURNS MD

## 2018-09-18 NOTE — PROGRESS NOTES
SUBJECTIVE:   Asha Sanford is a 83 year old male who presents to clinic today for the following health issues:      Eye(s) Problem      Duration: since June    Description:  Location: bilateral  Pain: YES  Redness: YES  Discharge: YES    Accompanying signs and symptoms: itchy ears    History (Trauma, foreign body exposure,): None    Precipitating or alleviating factors (contact use): None    Therapies tried and outcome: None        (M54.40,  G89.29) Chronic low back pain with sciatica, sciatica laterality unspecified, unspecified back pain laterality  Comment:    Plan: acetaminophen (MAPAP) 500 MG tablet             (M25.561) Acute pain of right knee  Comment:     Plan: acetaminophen (MAPAP) 500 MG tablet             (K21.9) Gastroesophageal reflux disease without esophagitis  Comment:    Plan: omeprazole (PRILOSEC) 20 MG CR capsule             (R30.0) Dysuria  Comment:    Plan: tamsulosin (FLOMAX) 0.4 MG capsule              (H92.02) Otalgia of left ear  Comment:    Plan: neomycin-polymyxin-hydrocortisone (CORTISPORIN)        3.5-95922-1 otic suspension             (J34.0) Nasal furuncle  Comment:     Plan: mupirocin (BACTROBAN) 2 % nasal ointment             (I10) Essential hypertension, benign  Comment:    Plan: metoprolol succinate (TOPROL-XL) 50 MG 24 hr         tablet             (M54.40,  G89.29) Chronic right-sided low back pain with sciatica, sciatica laterality unspecified  Comment:    Plan: lidocaine (XYLOCAINE) 5 % ointment             (M54.2) Cervicalgia  Comment:     Plan: gabapentin (NEURONTIN) 100 MG capsule,         aspirin-acetaminophen-caffeine (EXCEDRIN         MIGRAINE) 250-250-65 MG per tablet              Topic Date Due     DIABETIC EDUCATION Q1 YEAR  01/01/1936     EYE EXAM Q1 YEAR  09/11/2015     PHQ-9 Q1YR  01/09/2018               .  Current Outpatient Prescriptions   Medication Sig Dispense Refill     acetaminophen (MAPAP) 500 MG tablet TAKE 1 TABLET BY MOUTH EVERY 6 HOURS AS  NEEDED 100 tablet 11     albuterol (PROAIR HFA/PROVENTIL HFA/VENTOLIN HFA) 108 (90 Base) MCG/ACT inhaler Inhale 2 puffs into the lungs every 6 hours as needed for shortness of breath / dyspnea or wheezing 1 Inhaler 0     aspirin 81 MG tablet Take 1 tablet (81 mg) by mouth daily Correct ASA 30 tablet 11     aspirin-acetaminophen-caffeine (EXCEDRIN MIGRAINE) 250-250-65 MG per tablet Take 1 tablet by mouth every 6 hours as needed for headaches 80 tablet 1     atorvastatin (LIPITOR) 20 MG tablet Take 1 tablet (20 mg) by mouth daily 90 tablet 3     blood glucose (NO BRAND SPECIFIED) lancets standard Use to test blood sugar  dialy times daily or as directed. 1 Box 0     blood glucose calibration (NO BRAND SPECIFIED) solution Use to calibrate blood glucose monitor as directed. 1 each 11     blood glucose monitoring (NO BRAND SPECIFIED) meter device kit Use to test blood sugar  Twice daily  times daily or as directed. 1 kit 0     blood glucose monitoring (NO BRAND SPECIFIED) test strip Use to test blood sugars  Daily  times daily or as directed 100 strip 0     carbidopa-levodopa (SINEMET)  MG per tablet TAKE 1/2 TABLET BY MOUTH THREE TIMES DAILY 135 tablet 3     finasteride (PROSCAR) 5 MG tablet Take 1 tablet (5 mg) by mouth daily 90 tablet 11     gabapentin (NEURONTIN) 100 MG capsule Take 1 capsule (100 mg) by mouth 2 times daily 180 capsule 11     lidocaine (XYLOCAINE) 5 % ointment One application 4 x daily to affected areas lower back and knees if necessary 142 g 11     metoprolol succinate (TOPROL-XL) 50 MG 24 hr tablet Take 1 tablet (50 mg) by mouth daily 90 tablet 3     mupirocin (BACTROBAN) 2 % nasal ointment Spray 1 g into both nostrils 2 times daily 10 g 3     neomycin-polymyxin-hydrocortisone (CORTISPORIN) 3.5-19277-5 otic suspension Place 4 drops into both ears 4 times daily 10 mL 3     omeprazole (PRILOSEC) 20 MG CR capsule TAKE ONE CAPSULE BY MOUTH ONCE DAILY EVERY MORNING ONE-HALF HOUR BEFORE A MEAL 90  capsule 3     polyethylene glycol 0.4%- propylene glycol 0.3% (SYSTANE) 0.4-0.3 % SOLN ophthalmic solution Place 1 drop into both eyes 4 times daily as needed for dry eyes 1 Bottle 11     polyvinyl alcohol (ARTIFICIAL TEARS) 1.4 % ophthalmic solution Place 1 drop into both eyes as needed for dry eyes 15 mL 11     tamsulosin (FLOMAX) 0.4 MG capsule TAKE 1 CAPSULE BY MOUTH EVERY DAY 90 capsule 11     triamcinolone (KENALOG) 0.1 % cream Apply sparingly to affected area three times daily as needed 80 g 0     vitamin D (ERGOCALCIFEROL) 56395 UNIT capsule TAKE 1 CAPSULE BY MOUTH EVERY WEEK 12 capsule 11     ACE/ARB NOT PRESCRIBED, INTENTIONAL, ACE & ARB not prescribed due to Intolerance and Other: hypokalemia x 2 episodes (Patient not taking: Reported on 2017)              No Known Allergies      Immunization History   Administered Date(s) Administered     Influenza (High Dose) 3 valent vaccine 10/18/2013, 09/15/2015, 10/18/2016     Influenza (IIV3) PF 2007, 10/21/2008, 10/11/2014     Influenza Vaccine IM 3yrs+ 4 Valent IIV4 2018     Pneumo Conj 13-V (2010&after) 2017     Pneumococcal 23 valent 2007, 10/11/2014     Zoster vaccine, live 10/11/2014               reports that he does not drink alcohol.        reports that he does not use illicit drugs.      family history includes Unknown/Adopted in his father and mother.      indicated that his mother is . He indicated that his father is .        has a past surgical history that includes SURGICAL PATHOLOGY (2010); Eye surgery (); and shoulder surgery.       reports that he does not engage in sexual activity.    .  Pediatric History   Patient Guardian Status     Not on file.     Other Topics Concern     Parent/Sibling W/ Cabg, Mi Or Angioplasty Before 65f 55m? No     unknown     Social History Narrative             reports that he has never smoked. He has never used smokeless tobacco.        Medical, social, surgical, and  family histories reviewed.        Labs reviewed in EPIC  Patient Active Problem List   Diagnosis     Health Care Home     Dysuria     Essential and other specified forms of tremor     Abnormal involuntary movement     Lipoma     Cervical spondylosis without myelopathy     Degeneration of cervical intervertebral disc     Vitamin D deficiency     Memory loss     Obesity     Insomnia     Gastroesophageal reflux disease without esophagitis     Hyperlipidemia LDL goal <100     Pain in joint involving lower leg     Hypertension goal BP (blood pressure) < 140/80     Type 2 diabetes mellitus without complications (H)     Lumbago     Chronic pain of both knees     Headache     Nausea     UTI (urinary tract infection)     Lung nodule seen on imaging study     Bladder stones     ACP (advance care planning)     Hyponatremia     Tension headache       Past Surgical History:   Procedure Laterality Date     CL AFF SURGICAL PATHOLOGY  7/1/2010     EYE SURGERY  2003    retinal detatch     SHOULDER SURGERY           Social History   Substance Use Topics     Smoking status: Never Smoker     Smokeless tobacco: Never Used     Alcohol use No       Family History   Problem Relation Age of Onset     Unknown/Adopted Mother      Unknown/Adopted Father              Current Outpatient Prescriptions   Medication Sig Dispense Refill     acetaminophen (MAPAP) 500 MG tablet TAKE 1 TABLET BY MOUTH EVERY 6 HOURS AS NEEDED 100 tablet 11     albuterol (PROAIR HFA/PROVENTIL HFA/VENTOLIN HFA) 108 (90 Base) MCG/ACT inhaler Inhale 2 puffs into the lungs every 6 hours as needed for shortness of breath / dyspnea or wheezing 1 Inhaler 0     aspirin 81 MG tablet Take 1 tablet (81 mg) by mouth daily Correct ASA 30 tablet 11     aspirin-acetaminophen-caffeine (EXCEDRIN MIGRAINE) 250-250-65 MG per tablet Take 1 tablet by mouth every 6 hours as needed for headaches 80 tablet 1     atorvastatin (LIPITOR) 20 MG tablet Take 1 tablet (20 mg) by mouth daily 90 tablet  3     blood glucose (NO BRAND SPECIFIED) lancets standard Use to test blood sugar  dialy times daily or as directed. 1 Box 0     blood glucose calibration (NO BRAND SPECIFIED) solution Use to calibrate blood glucose monitor as directed. 1 each 11     blood glucose monitoring (NO BRAND SPECIFIED) meter device kit Use to test blood sugar  Twice daily  times daily or as directed. 1 kit 0     blood glucose monitoring (NO BRAND SPECIFIED) test strip Use to test blood sugars  Daily  times daily or as directed 100 strip 0     carbidopa-levodopa (SINEMET)  MG per tablet TAKE 1/2 TABLET BY MOUTH THREE TIMES DAILY 135 tablet 3     finasteride (PROSCAR) 5 MG tablet Take 1 tablet (5 mg) by mouth daily 90 tablet 11     gabapentin (NEURONTIN) 100 MG capsule Take 1 capsule (100 mg) by mouth 2 times daily 180 capsule 11     lidocaine (XYLOCAINE) 5 % ointment One application 4 x daily to affected areas lower back and knees if necessary 142 g 11     metoprolol succinate (TOPROL-XL) 50 MG 24 hr tablet Take 1 tablet (50 mg) by mouth daily 90 tablet 3     mupirocin (BACTROBAN) 2 % nasal ointment Spray 1 g into both nostrils 2 times daily 10 g 3     neomycin-polymyxin-hydrocortisone (CORTISPORIN) 3.5-29396-9 otic suspension Place 4 drops into both ears 4 times daily 10 mL 3     omeprazole (PRILOSEC) 20 MG CR capsule TAKE ONE CAPSULE BY MOUTH ONCE DAILY EVERY MORNING ONE-HALF HOUR BEFORE A MEAL 90 capsule 3     polyethylene glycol 0.4%- propylene glycol 0.3% (SYSTANE) 0.4-0.3 % SOLN ophthalmic solution Place 1 drop into both eyes 4 times daily as needed for dry eyes 1 Bottle 11     polyvinyl alcohol (ARTIFICIAL TEARS) 1.4 % ophthalmic solution Place 1 drop into both eyes as needed for dry eyes 15 mL 11     tamsulosin (FLOMAX) 0.4 MG capsule TAKE 1 CAPSULE BY MOUTH EVERY DAY 90 capsule 11     triamcinolone (KENALOG) 0.1 % cream Apply sparingly to affected area three times daily as needed 80 g 0     vitamin D (ERGOCALCIFEROL) 85098  UNIT capsule TAKE 1 CAPSULE BY MOUTH EVERY WEEK 12 capsule 11     ACE/ARB NOT PRESCRIBED, INTENTIONAL, ACE & ARB not prescribed due to Intolerance and Other: hypokalemia x 2 episodes (Patient not taking: Reported on 7/18/2017)           Recent Labs   Lab Test  09/18/18   1636  06/01/18   1551  07/18/17   1049  08/22/16   1427   05/05/15   0818   A1C  5.4  5.6  5.4  5.7   < >   --    LDL   --   156*  128*  147*   < >   --    HDL   --   45  55  52   < >   --    TRIG   --   180*  123  144   < >   --    ALT   --   26  22  19   < >  26   CR   --   1.02  0.95  0.99   < >  0.86   GFRESTIMATED   --   70  76  73   < >  87   GFRESTBLACK   --   85  >90   GFR Calc    88   < >  >90   GFR Calc     POTASSIUM   --   4.3  4.3  4.8   < >  3.6   TSH   --    --   0.89   --    --   0.56    < > = values in this interval not displayed.            BP Readings from Last 6 Encounters:   09/18/18 124/70   06/01/18 126/74   01/25/18 118/64   07/18/17 112/62   01/09/17 118/62   12/12/16 118/64         Wt Readings from Last 3 Encounters:   09/18/18 157 lb (71.2 kg)   06/01/18 153 lb (69.4 kg)   01/25/18 153 lb (69.4 kg)               Positive symptoms or findings indicated by bold designation:         ROS: 10 point ROS neg other than the symptoms noted above in the HPI.except  has Health Care Home; Dysuria; Essential and other specified forms of tremor; Abnormal involuntary movement; Lipoma; Cervical spondylosis without myelopathy; Degeneration of cervical intervertebral disc; Vitamin D deficiency; Memory loss; Obesity; Insomnia; Gastroesophageal reflux disease without esophagitis; Hyperlipidemia LDL goal <100; Pain in joint involving lower leg; Hypertension goal BP (blood pressure) < 140/80; Type 2 diabetes mellitus without complications (H); Lumbago; Chronic pain of both knees; Headache; Nausea; UTI (urinary tract infection); Lung nodule seen on imaging study; Bladder stones; ACP (advance care planning);  Hyponatremia; and Tension headache on his problem list.  Review Of Systems    GASTROESOPHAGEAL REFLUX DISEASE WITHOUT ESOPHAGITIS     HYPERTENSION WITH GOAL OF LESS THAN 140/80     VITAMIN D REPLACEMENT      TREMOR     MILD INTERMITTENT MEMORY LOSS    MILD OBESITY IMPROVED     Skin: negative    Eyes: negative DRY EYES     Ears/Nose/Throat: negative    Respiratory: No shortness of breath, dyspnea on exertion, cough, or hemoptysis    Cardiovascular: negative    Gastrointestinal: heartburn    Genitourinary: negative    Musculoskeletal: negative    Neurologic: negative    Psychiatric: negative    Hematologic/Lymphatic/Immunologic: negative    Endocrine:  DIABETES 2 GOAL 8%                 PE:  /70 (Cuff Size: Adult Large)  Pulse 69  Temp 96.6  F (35.9  C) (Tympanic)  Resp 16  Wt 157 lb (71.2 kg)  SpO2 96%  BMI 26.95 kg/m2 Body mass index is 26.95 kg/(m^2).        Constitutional: general appearance, well nourished, well developed, in no acute distress, well developed, appears stated age, normal body habitus,          Eyes:; The patient has normal eyelids sclerae and conjunctivae :          Ears/Nose/Throat: external ear, overall: normal appearance; external nose, overall: benign appearance, normal moujth gums and lips           Neck: thyroid, overall: normal size, normal consistency, nontender,          Respiratory:  palpation of chest, overall: normal excursion,    Clear to percussion and auscultation     NO Tachypnea    NORMAL  Color          Cardiovascular:  Good color with no peripheral edema    Regular sinus rhythm without murmur.  Physiologic heart sounds   Heart is unelarged    .     Chest/Breast: normal shape           Abdominal exam,  Liver and spleen are  unenlarged        Tenderness    Scars              Urogenital; no renal, flank or bladder  tenderness;          Lymphatic: neck nodes,     Other nodes         Musculoskeletal:  Brief ortho exam normal except:   OSTEOARTHRITIS      BILATERAL KNEE  PAIN     DECREASE RANGE OF MOTION OF BACK         Integument: inspection of skin, no rash, lesions; and, palpation, no induration, no tenderness.          Neurologic mental status, overall: alert and oriented; gait, no ataxia, no unsteadiness; coordination, no tremors; cranial nerves, overall: normal motor, overall: normal bulk, tone.  TREMOR CONTROLLED         Psychiatric: orientation/consciousness, overall: oriented to person, place and time; behavior/psychomotor activity, no tics, normal psychomotor activity; mood and affect, overall: normal mood and affect; appearance, overall: well-groomed, good eye contact; speech, overall: normal quality, no aphasia and normal quality, quantity, intact.        Diagnostic Test Results:  Results for orders placed or performed in visit on 09/18/18   Hemoglobin A1c   Result Value Ref Range    Hemoglobin A1C 5.4 0 - 5.6 %           ICD-10-CM    1. Chronic low back pain with sciatica, sciatica laterality unspecified, unspecified back pain laterality M54.40 acetaminophen (MAPAP) 500 MG tablet    G89.29    2. Acute pain of right knee M25.561 acetaminophen (MAPAP) 500 MG tablet   3. Gastroesophageal reflux disease without esophagitis K21.9 omeprazole (PRILOSEC) 20 MG CR capsule   4. Dysuria R30.0 tamsulosin (FLOMAX) 0.4 MG capsule   5. Otalgia of left ear H92.02 neomycin-polymyxin-hydrocortisone (CORTISPORIN) 3.5-94543-7 otic suspension   6. Nasal furuncle J34.0 mupirocin (BACTROBAN) 2 % nasal ointment   7. Essential hypertension, benign I10 metoprolol succinate (TOPROL-XL) 50 MG 24 hr tablet   8. Chronic right-sided low back pain with sciatica, sciatica laterality unspecified M54.40 lidocaine (XYLOCAINE) 5 % ointment    G89.29    9. Cervicalgia M54.2 gabapentin (NEURONTIN) 100 MG capsule     aspirin-acetaminophen-caffeine (EXCEDRIN MIGRAINE) 250-250-65 MG per tablet   10. Hesitancy of micturition R39.11 finasteride (PROSCAR) 5 MG tablet   11. Tremor R25.1 carbidopa-levodopa (SINEMET)   MG per tablet   12. Type 2 diabetes mellitus without complication, without long-term current use of insulin (H) E11.9 blood glucose monitoring (NO BRAND SPECIFIED) test strip     Hemoglobin A1c   13. Hyperlipidemia LDL goal <100 E78.5 atorvastatin (LIPITOR) 20 MG tablet     aspirin 81 MG tablet   14. Wheezing R06.2 albuterol (PROAIR HFA/PROVENTIL HFA/VENTOLIN HFA) 108 (90 Base) MCG/ACT inhaler   15. Blister of leg, left, initial encounter S80.822A triamcinolone (KENALOG) 0.1 % cream   16. Vitamin D deficiency E55.9    17. Hypertension goal BP (blood pressure) < 140/80 I10 aspirin 81 MG tablet   18. Dry eyes, bilateral H04.123 polyethylene glycol 0.4%- propylene glycol 0.3% (SYSTANE) 0.4-0.3 % SOLN ophthalmic solution   19. Dry eyes H04.123 polyvinyl alcohol (ARTIFICIAL TEARS) 1.4 % ophthalmic solution   20. Need for influenza vaccination Z23 FLU VACCINE, 3 YRS +, IM (FLUZONE)     CANCELED: ADMIN INFLUENZA VIRUS VAC              .    Side effects benefits and risks thoroughly discussed. .he may come in early if unimproved or getting worse          Please drink 2 glasses of water prior to meals and walk 15-30 minutes after meals        I spent 25 MINUTES SPENT  with patient discussing the following issues   The primary encounter diagnosis was Chronic low back pain with sciatica, sciatica laterality unspecified, unspecified back pain laterality. Diagnoses of Acute pain of right knee, Gastroesophageal reflux disease without esophagitis, Dysuria, Otalgia of left ear, Nasal furuncle, Essential hypertension, benign, Chronic right-sided low back pain with sciatica, sciatica laterality unspecified, Cervicalgia, Hesitancy of micturition, Tremor, Type 2 diabetes mellitus without complication, without long-term current use of insulin (H), Hyperlipidemia LDL goal <100, Wheezing, Blister of leg, left, initial encounter, Vitamin D deficiency, Hypertension goal BP (blood pressure) < 140/80, Dry eyes, bilateral, Dry eyes, and  Need for influenza vaccination were also pertinent to this visit. over half of which involved counseling and coordination of care.      Patient Instructions     (R25.1) Tremor  Comment:    Plan: carbidopa-levodopa (SINEMET)  MG per         tablet             (E11.9) Type 2 diabetes mellitus without complication, without long-term current use of insulin (H)  Comment:    Plan: blood glucose monitoring (NO BRAND SPECIFIED)         test strip, Hemoglobin A1c             (E78.5) Hyperlipidemia LDL goal <100  Comment:    Plan: atorvastatin (LIPITOR) 20 MG tablet, aspirin 81        MG tablet             (R06.2) Wheezing  Comment:    Plan: albuterol (PROAIR HFA/PROVENTIL HFA/VENTOLIN         HFA) 108 (90 Base) MCG/ACT inhaler             (S80.822A) Blister of leg, left, initial encounter  Comment:    Plan: triamcinolone (KENALOG) 0.1 % cream             (E55.9) Vitamin D deficiency  Comment:    Plan:      (I10) Hypertension goal BP (blood pressure) < 140/80  Comment:    Plan: aspirin 81 MG tablet             (H04.123) Dry eyes, bilateral  Comment:    Plan: polyethylene glycol 0.4%- propylene glycol 0.3%        (SYSTANE) 0.4-0.3 % SOLN ophthalmic solution             (H04.123) Dry eyes  Comment:    Plan: polyvinyl alcohol (ARTIFICIAL TEARS) 1.4 %         ophthalmic solution                     ALL THE ABOVE PROBLEMS ARE STABLE AND MED CHANGES AS NOTED        Diet: MEDITERRANEAN DIET DIABETES DIET      Exercise:    Exercises Range of motion, balance, isometric, and strengthening exercises 30 repetitions twice daily of involved joints            .AMY BURNS MD 9/18/2018 8:03 AM  September 19, 2018      \

## 2018-09-18 NOTE — MR AVS SNAPSHOT
After Visit Summary   9/18/2018    Asha Sanford    MRN: [id number]           Patient Information     Date Of Birth          1935        Visit Information        Provider Department      9/18/2018 3:30 PM Edwardo Moser MD; [employer] [facility]        Today's Diagnoses     Chronic low back pain with sciatica, sciatica laterality unspecified, unspecified back pain laterality    -  1    Acute pain of right knee        Gastroesophageal reflux disease without esophagitis        Dysuria        Otalgia of left ear        Nasal furuncle        Essential hypertension, benign        Chronic right-sided low back pain with sciatica, sciatica laterality unspecified        Cervicalgia        Hesitancy of micturition        Tremor        Type 2 diabetes mellitus without complication, without long-term current use of insulin (H)        Hyperlipidemia LDL goal <100        Wheezing        Blister of leg, left, initial encounter        Vitamin D deficiency        Hypertension goal BP (blood pressure) < 140/80        Dry eyes, bilateral        Dry eyes        Need for influenza vaccination          Care Instructions      (R25.1) Tremor  Comment:    Plan: carbidopa-levodopa (SINEMET)  MG per         tablet             (E11.9) Type 2 diabetes mellitus without complication, without long-term current use of insulin (H)  Comment:    Plan: blood glucose monitoring (NO BRAND SPECIFIED)         test strip, Hemoglobin A1c             (E78.5) Hyperlipidemia LDL goal <100  Comment:    Plan: atorvastatin (LIPITOR) 20 MG tablet, aspirin 81        MG tablet             (R06.2) Wheezing  Comment:    Plan: albuterol (PROAIR HFA/PROVENTIL HFA/VENTOLIN         HFA) 108 (90 Base) MCG/ACT inhaler             (S80.822A) Blister of leg, left, initial encounter  Comment:    Plan: triamcinolone (KENALOG) 0.1 % cream             (E55.9) Vitamin D deficiency  Comment:     Plan:      (I10) Hypertension goal BP (blood pressure) < 140/80  Comment:    Plan: aspirin 81 MG tablet             (H04.123) Dry eyes, bilateral  Comment:    Plan: polyethylene glycol 0.4%- propylene glycol 0.3%        (SYSTANE) 0.4-0.3 % SOLN ophthalmic solution             (H04.123) Dry eyes  Comment:    Plan: polyvinyl alcohol (ARTIFICIAL TEARS) 1.4 %         ophthalmic solution                     Follow-ups after your visit        Who to contact     If you have questions or need follow up information about today's clinic visit or your schedule please contact [facility] directly at [phone].  Normal or non-critical lab and imaging results will be communicated to you by MyChart, letter or phone within 4 business days after the clinic has received the results. If you do not hear from us within 7 days, please contact the clinic through MyChart or phone. If you have a critical or abnormal lab result, we will notify you by phone as soon as possible.  Submit refill requests through [employer] or call your pharmacy and they will forward the refill request to us. Please allow 3 business days for your refill to be completed.          Additional Information About Your Visit        Care EveryWhere ID     This is your Care EveryWhere ID. This could be used by other organizations to access your [facility] medical records  [id number]        Your Vitals Were     Pulse Temperature Respirations Pulse Oximetry BMI (Body Mass Index)       69 96.6  F (35.9  C) (Tympanic) 16 96% 26.95 kg/m2        Blood Pressure from Last 3 Encounters:   09/18/18 124/70   06/01/18 126/74   01/25/18 118/64    Weight from Last 3 Encounters:   09/18/18 157 lb (71.2 kg)   06/01/18 153 lb (69.4 kg)   01/25/18 153 lb (69.4 kg)              We Performed the Following     FLU VACCINE, 3 YRS +, IM (FLUZONE)     Hemoglobin A1c          Where to get your medicines      These medications were sent to [facility]  [address] - [address] & [address] [address]     Phone:  [phone]     acetaminophen 500 MG tablet    albuterol 108 (90 Base) MCG/ACT inhaler    aspirin 81 MG tablet    aspirin-acetaminophen-caffeine 250-250-65 MG per tablet    atorvastatin 20 MG tablet    blood glucose monitoring test strip    carbidopa-levodopa  MG per tablet    finasteride 5 MG tablet    gabapentin 100 MG capsule    lidocaine 5 % ointment    metoprolol succinate 50 MG 24 hr tablet    mupirocin 2 % nasal ointment    neomycin-polymyxin-hydrocortisone 3.5-33934-5 otic suspension    omeprazole 20 MG CR capsule    polyethylene glycol 0.4%- propylene glycol 0.3% 0.4-0.3 % Soln ophthalmic solution    polyvinyl alcohol 1.4 % ophthalmic solution    tamsulosin 0.4 MG capsule    triamcinolone 0.1 % cream          Primary Care Provider Office Phone # Fax #    Edwardo Linda Moser [phone] [address] [facility] [id number]        Equal Access to Services     [facility]: Hadii aad ku hadasho Soomaali, waaxda luqadaha, qaybta kaalmada adeegyada, waxay nelli barker . So [facility] [phone].    ATENCIÓN: Si habla español, tiene a norton disposición servicios gratkeilyos de asistencia lingüística. Yuliana al [phone].    We comply with applicable federal civil rights laws and [place] laws. We do not discriminate on the basis of race, color, national origin, age, disability, sex, sexual orientation, or gender identity.            Thank you!     Thank you for choosing [facility]  for your care. Our goal is always to provide you with excellent care. Hearing back from our patients is one way we can continue to improve our services. Please take a few minutes to complete the written survey that you may receive in the mail after your visit with us. Thank you!             Your Updated Medication List -  Protect others around you: Learn how to safely use, store and throw away your medicines at www.disposemymeds.org.          This list is accurate as of 9/18/18 11:59 PM.  Always use your most recent med list.                   Brand Name Dispense Instructions for use Diagnosis    ACE/ARB/ARNI NOT PRESCRIBED (INTENTIONAL)      ACE & ARB not prescribed due to Intolerance and Other: hypokalemia x 2 episodes    Type 2 diabetes mellitus without complications (H), Hyponatremia       acetaminophen 500 MG tablet    MAPAP    100 tablet    TAKE 1 TABLET BY MOUTH EVERY 6 HOURS AS NEEDED    Chronic low back pain with sciatica, sciatica laterality unspecified, unspecified back pain laterality, Acute pain of right knee       albuterol 108 (90 Base) MCG/ACT inhaler    PROAIR HFA/PROVENTIL HFA/VENTOLIN HFA    1 Inhaler    Inhale 2 puffs into the lungs every 6 hours as needed for shortness of breath / dyspnea or wheezing    Wheezing       aspirin 81 MG tablet     30 tablet    Take 1 tablet (81 mg) by mouth daily Correct ASA    Hyperlipidemia LDL goal <100, Hypertension goal BP (blood pressure) < 140/80       aspirin-acetaminophen-caffeine 250-250-65 MG per tablet    EXCEDRIN MIGRAINE    80 tablet    Take 1 tablet by mouth every 6 hours as needed for headaches    Cervicalgia       atorvastatin 20 MG tablet    LIPITOR    90 tablet    Take 1 tablet (20 mg) by mouth daily    Hyperlipidemia LDL goal <100       blood glucose calibration solution    no brand specified    1 each    Use to calibrate blood glucose monitor as directed.    Type 2 diabetes mellitus without complication, without long-term current use of insulin (H)       blood glucose lancets standard    no brand specified    1 Box    Use to test blood sugar  dialy times daily or as directed.    Type 2 diabetes mellitus without complication, without long-term current use of insulin (H)       blood glucose monitoring meter device kit    no brand specified    1 kit    Use to test  blood sugar  Twice daily  times daily or as directed.    Type 2 diabetes mellitus without complication, without long-term current use of insulin (H)       blood glucose monitoring test strip    no brand specified    100 strip    Use to test blood sugars  Daily  times daily or as directed    Type 2 diabetes mellitus without complication, without long-term current use of insulin (H)       carbidopa-levodopa  MG per tablet    SINEMET    135 tablet    TAKE 1/2 TABLET BY MOUTH THREE TIMES DAILY    Tremor       finasteride 5 MG tablet    PROSCAR    90 tablet    Take 1 tablet (5 mg) by mouth daily    Hesitancy of micturition       gabapentin 100 MG capsule    NEURONTIN    180 capsule    Take 1 capsule (100 mg) by mouth 2 times daily    Cervicalgia       lidocaine 5 % ointment    XYLOCAINE    142 g    One application 4 x daily to affected areas lower back and knees if necessary    Chronic right-sided low back pain with sciatica, sciatica laterality unspecified       metoprolol succinate 50 MG 24 hr tablet    TOPROL-XL    90 tablet    Take 1 tablet (50 mg) by mouth daily    Essential hypertension, benign       mupirocin 2 % nasal ointment    BACTROBAN    10 g    Spray 1 g into both nostrils 2 times daily    Nasal furuncle       neomycin-polymyxin-hydrocortisone 3.5-78600-3 otic suspension    CORTISPORIN    10 mL    Place 4 drops into both ears 4 times daily    Otalgia of left ear       omeprazole 20 MG CR capsule    priLOSEC    90 capsule    TAKE ONE CAPSULE BY MOUTH ONCE DAILY EVERY MORNING ONE-HALF HOUR BEFORE A MEAL    Gastroesophageal reflux disease without esophagitis       polyethylene glycol 0.4%- propylene glycol 0.3% 0.4-0.3 % Soln ophthalmic solution    SYSTANE    1 Bottle    Place 1 drop into both eyes 4 times daily as needed for dry eyes    Dry eyes, bilateral       polyvinyl alcohol 1.4 % ophthalmic solution    ARTIFICIAL TEARS    15 mL    Place 1 drop into both eyes as needed for dry eyes    Dry eyes        tamsulosin 0.4 MG capsule    FLOMAX    90 capsule    TAKE 1 CAPSULE BY MOUTH EVERY DAY    Dysuria       triamcinolone 0.1 % cream    KENALOG    80 g    Apply sparingly to affected area three times daily as needed    Blister of leg, left, initial encounter       vitamin D 07529 UNIT capsule    ERGOCALCIFEROL    12 capsule    TAKE 1 CAPSULE BY MOUTH EVERY WEEK    Vitamin D deficiency

## 2018-09-19 NOTE — PATIENT INSTRUCTIONS
(R25.1) Tremor  Comment:    Plan: carbidopa-levodopa (SINEMET)  MG per         tablet             (E11.9) Type 2 diabetes mellitus without complication, without long-term current use of insulin (H)  Comment:    Plan: blood glucose monitoring (NO BRAND SPECIFIED)         test strip, Hemoglobin A1c             (E78.5) Hyperlipidemia LDL goal <100  Comment:    Plan: atorvastatin (LIPITOR) 20 MG tablet, aspirin 81        MG tablet             (R06.2) Wheezing  Comment:    Plan: albuterol (PROAIR HFA/PROVENTIL HFA/VENTOLIN         HFA) 108 (90 Base) MCG/ACT inhaler             (S80.822A) Blister of leg, left, initial encounter  Comment:    Plan: triamcinolone (KENALOG) 0.1 % cream             (E55.9) Vitamin D deficiency  Comment:    Plan:      (I10) Hypertension goal BP (blood pressure) < 140/80  Comment:    Plan: aspirin 81 MG tablet             (H04.123) Dry eyes, bilateral  Comment:    Plan: polyethylene glycol 0.4%- propylene glycol 0.3%        (SYSTANE) 0.4-0.3 % SOLN ophthalmic solution             (H04.123) Dry eyes  Comment:    Plan: polyvinyl alcohol (ARTIFICIAL TEARS) 1.4 %         ophthalmic solution

## 2018-11-29 ENCOUNTER — RADIANT APPOINTMENT (OUTPATIENT)
Dept: GENERAL RADIOLOGY | Facility: CLINIC | Age: 83
End: 2018-11-29
Attending: FAMILY MEDICINE
Payer: COMMERCIAL

## 2018-11-29 ENCOUNTER — OFFICE VISIT (OUTPATIENT)
Dept: FAMILY MEDICINE | Facility: CLINIC | Age: 83
End: 2018-11-29
Payer: COMMERCIAL

## 2018-11-29 VITALS
OXYGEN SATURATION: 98 % | HEART RATE: 88 BPM | WEIGHT: 162 LBS | TEMPERATURE: 97.8 F | BODY MASS INDEX: 27.81 KG/M2 | RESPIRATION RATE: 16 BRPM | DIASTOLIC BLOOD PRESSURE: 70 MMHG | SYSTOLIC BLOOD PRESSURE: 138 MMHG

## 2018-11-29 DIAGNOSIS — E78.5 HYPERLIPIDEMIA LDL GOAL <100: Chronic | ICD-10-CM

## 2018-11-29 DIAGNOSIS — M47.812 CERVICAL SPONDYLOSIS WITHOUT MYELOPATHY: ICD-10-CM

## 2018-11-29 DIAGNOSIS — K21.9 GASTROESOPHAGEAL REFLUX DISEASE WITHOUT ESOPHAGITIS: Chronic | ICD-10-CM

## 2018-11-29 DIAGNOSIS — I10 HYPERTENSION GOAL BP (BLOOD PRESSURE) < 140/80: Chronic | ICD-10-CM

## 2018-11-29 DIAGNOSIS — Z11.1 SCREENING EXAMINATION FOR PULMONARY TUBERCULOSIS: ICD-10-CM

## 2018-11-29 DIAGNOSIS — R41.3 MEMORY LOSS: ICD-10-CM

## 2018-11-29 DIAGNOSIS — E11.9 ENCOUNTER FOR DIABETIC FOOT EXAM (H): ICD-10-CM

## 2018-11-29 DIAGNOSIS — E11.9 TYPE 2 DIABETES MELLITUS WITHOUT COMPLICATION, WITHOUT LONG-TERM CURRENT USE OF INSULIN (H): Primary | Chronic | ICD-10-CM

## 2018-11-29 DIAGNOSIS — F51.01 PRIMARY INSOMNIA: ICD-10-CM

## 2018-11-29 DIAGNOSIS — E55.9 VITAMIN D DEFICIENCY: ICD-10-CM

## 2018-11-29 PROCEDURE — 99214 OFFICE O/P EST MOD 30 MIN: CPT | Performed by: FAMILY MEDICINE

## 2018-11-29 PROCEDURE — 71045 X-RAY EXAM CHEST 1 VIEW: CPT | Mod: FY

## 2018-11-29 PROCEDURE — 99207 C PAF COMPLETED  NO CHARGE: CPT | Performed by: FAMILY MEDICINE

## 2018-11-29 ASSESSMENT — PATIENT HEALTH QUESTIONNAIRE - PHQ9
5. POOR APPETITE OR OVEREATING: NOT AT ALL
SUM OF ALL RESPONSES TO PHQ QUESTIONS 1-9: 7

## 2018-11-29 ASSESSMENT — ANXIETY QUESTIONNAIRES
7. FEELING AFRAID AS IF SOMETHING AWFUL MIGHT HAPPEN: SEVERAL DAYS
GAD7 TOTAL SCORE: 5
2. NOT BEING ABLE TO STOP OR CONTROL WORRYING: NOT AT ALL
5. BEING SO RESTLESS THAT IT IS HARD TO SIT STILL: NOT AT ALL
1. FEELING NERVOUS, ANXIOUS, OR ON EDGE: NOT AT ALL
6. BECOMING EASILY ANNOYED OR IRRITABLE: NEARLY EVERY DAY
3. WORRYING TOO MUCH ABOUT DIFFERENT THINGS: SEVERAL DAYS

## 2018-11-29 NOTE — PATIENT INSTRUCTIONS
Current Outpatient Prescriptions   Medication     acetaminophen (MAPAP) 500 MG tablet     albuterol (PROAIR HFA/PROVENTIL HFA/VENTOLIN HFA) 108 (90 Base) MCG/ACT inhaler     aspirin 81 MG tablet     aspirin-acetaminophen-caffeine (EXCEDRIN MIGRAINE) 250-250-65 MG per tablet     atorvastatin (LIPITOR) 20 MG tablet     blood glucose (NO BRAND SPECIFIED) lancets standard     blood glucose calibration (NO BRAND SPECIFIED) solution     blood glucose monitoring (NO BRAND SPECIFIED) meter device kit     blood glucose monitoring (NO BRAND SPECIFIED) test strip     carbidopa-levodopa (SINEMET)  MG per tablet     finasteride (PROSCAR) 5 MG tablet     gabapentin (NEURONTIN) 100 MG capsule     lidocaine (XYLOCAINE) 5 % ointment     metoprolol succinate (TOPROL-XL) 50 MG 24 hr tablet     mupirocin (BACTROBAN) 2 % nasal ointment     neomycin-polymyxin-hydrocortisone (CORTISPORIN) 3.5-00098-4 otic suspension     omeprazole (PRILOSEC) 20 MG CR capsule     polyethylene glycol 0.4%- propylene glycol 0.3% (SYSTANE) 0.4-0.3 % SOLN ophthalmic solution     polyvinyl alcohol (ARTIFICIAL TEARS) 1.4 % ophthalmic solution     tamsulosin (FLOMAX) 0.4 MG capsule     triamcinolone (KENALOG) 0.1 % cream     vitamin D (ERGOCALCIFEROL) 46053 UNIT capsule     ACE/ARB NOT PRESCRIBED, INTENTIONAL,     No current facility-administered medications for this visit.      Patient Active Problem List   Diagnosis     Health Care Home     Dysuria     Essential and other specified forms of tremor     Abnormal involuntary movement     Lipoma     Cervical spondylosis without myelopathy     Degeneration of cervical intervertebral disc     Vitamin D deficiency     Memory loss     Obesity     Insomnia     Gastroesophageal reflux disease without esophagitis     Hyperlipidemia LDL goal <100     Pain in joint involving lower leg     Hypertension goal BP (blood pressure) < 140/80     Type 2 diabetes mellitus without complications (H)     Lumbago     Chronic pain  of both knees     Headache     Nausea     UTI (urinary tract infection)     Lung nodule seen on imaging study     Bladder stones     ACP (advance care planning)     Hyponatremia     Tension headache     (E11.9) Type 2 diabetes mellitus without complication, without long-term current use of insulin (H)  (primary encounter diagnosis)  Comment:    Plan:      (I10) Hypertension goal BP (blood pressure) < 140/80  Comment:    Plan:      (E78.5) Hyperlipidemia LDL goal <100  Comment:    Plan:      (K21.9) Gastroesophageal reflux disease without esophagitis  Comment:    Plan:      (E55.9) Vitamin D deficiency  Comment:    Plan:      (M47.812) Cervical spondylosis without myelopathy  Comment:    Plan:      (R41.3) Memory loss  Comment:    Plan:      (F51.01) Primary insomnia  Comment:    Plan:      (Z11.1) Screening examination for pulmonary tuberculosis  Comment:    Plan: XR Chest 1 View             (E11.9) Encounter for diabetic foot exam (H)  Comment:    Plan:

## 2018-11-29 NOTE — MR AVS SNAPSHOT
After Visit Summary   11/29/2018    Asha Sanford    MRN: [id number]           Patient Information     Date Of Birth          1935        Visit Information        Provider Department      11/29/2018 1:00 PM Edwardo Moser MD; [employer] [facility]        Today's Diagnoses     Type 2 diabetes mellitus without complication, without long-term current use of insulin (H)    -  1    Hypertension goal BP (blood pressure) < 140/80        Hyperlipidemia LDL goal <100        Gastroesophageal reflux disease without esophagitis        Vitamin D deficiency        Cervical spondylosis without myelopathy        Memory loss        Primary insomnia        Screening examination for pulmonary tuberculosis        Encounter for diabetic foot exam (H)          Care Instructions    Current Outpatient Prescriptions   Medication     acetaminophen (MAPAP) 500 MG tablet     albuterol (PROAIR HFA/PROVENTIL HFA/VENTOLIN HFA) 108 (90 Base) MCG/ACT inhaler     aspirin 81 MG tablet     aspirin-acetaminophen-caffeine (EXCEDRIN MIGRAINE) 250-250-65 MG per tablet     atorvastatin (LIPITOR) 20 MG tablet     blood glucose (NO BRAND SPECIFIED) lancets standard     blood glucose calibration (NO BRAND SPECIFIED) solution     blood glucose monitoring (NO BRAND SPECIFIED) meter device kit     blood glucose monitoring (NO BRAND SPECIFIED) test strip     carbidopa-levodopa (SINEMET)  MG per tablet     finasteride (PROSCAR) 5 MG tablet     gabapentin (NEURONTIN) 100 MG capsule     lidocaine (XYLOCAINE) 5 % ointment     metoprolol succinate (TOPROL-XL) 50 MG 24 hr tablet     mupirocin (BACTROBAN) 2 % nasal ointment     neomycin-polymyxin-hydrocortisone (CORTISPORIN) 3.5-56046-5 otic suspension     omeprazole (PRILOSEC) 20 MG CR capsule     polyethylene glycol 0.4%- propylene glycol 0.3% (SYSTANE) 0.4-0.3 % SOLN ophthalmic solution     polyvinyl alcohol (ARTIFICIAL  TEARS) 1.4 % ophthalmic solution     tamsulosin (FLOMAX) 0.4 MG capsule     triamcinolone (KENALOG) 0.1 % cream     vitamin D (ERGOCALCIFEROL) 12724 UNIT capsule     ACE/ARB NOT PRESCRIBED, INTENTIONAL,     No current facility-administered medications for this visit.      Patient Active Problem List   Diagnosis     Health Care Home     Dysuria     Essential and other specified forms of tremor     Abnormal involuntary movement     Lipoma     Cervical spondylosis without myelopathy     Degeneration of cervical intervertebral disc     Vitamin D deficiency     Memory loss     Obesity     Insomnia     Gastroesophageal reflux disease without esophagitis     Hyperlipidemia LDL goal <100     Pain in joint involving lower leg     Hypertension goal BP (blood pressure) < 140/80     Type 2 diabetes mellitus without complications (H)     Lumbago     Chronic pain of both knees     Headache     Nausea     UTI (urinary tract infection)     Lung nodule seen on imaging study     Bladder stones     ACP (advance care planning)     Hyponatremia     Tension headache     (E11.9) Type 2 diabetes mellitus without complication, without long-term current use of insulin (H)  (primary encounter diagnosis)  Comment:    Plan:      (I10) Hypertension goal BP (blood pressure) < 140/80  Comment:    Plan:      (E78.5) Hyperlipidemia LDL goal <100  Comment:    Plan:      (K21.9) Gastroesophageal reflux disease without esophagitis  Comment:    Plan:      (E55.9) Vitamin D deficiency  Comment:    Plan:      (M47.812) Cervical spondylosis without myelopathy  Comment:    Plan:      (R41.3) Memory loss  Comment:    Plan:      (F51.01) Primary insomnia  Comment:    Plan:      (Z11.1) Screening examination for pulmonary tuberculosis  Comment:    Plan: XR Chest 1 View             (E11.9) Encounter for diabetic foot exam (H)  Comment:    Plan:                    Follow-ups after your visit        Follow-up notes from your care team     Return in about 3 months  (around 2/28/2019) for Diabetes visit.      Who to contact     If you have questions or need follow up information about today's clinic visit or your schedule please contact [facility] [place] directly at [phone].  Normal or non-critical lab and imaging results will be communicated to you by MyChart, letter or phone within 4 business days after the clinic has received the results. If you do not hear from us within 7 days, please contact the clinic through MyChart or phone. If you have a critical or abnormal lab result, we will notify you by phone as soon as possible.  Submit refill requests through [employer] or call your pharmacy and they will forward the refill request to us. Please allow 3 business days for your refill to be completed.          Additional Information About Your Visit        Care EveryWhere ID     This is your Care EveryWhere ID. This could be used by other organizations to access your [facility] medical records  [id number]        Your Vitals Were     Pulse Temperature Respirations Pulse Oximetry BMI (Body Mass Index)       88 97.8  F (36.6  C) 16 98% 27.81 kg/m2        Blood Pressure from Last 3 Encounters:   11/29/18 138/70   09/18/18 124/70   06/01/18 126/74    Weight from Last 3 Encounters:   11/29/18 162 lb (73.5 kg)   09/18/18 157 lb (71.2 kg)   06/01/18 153 lb (69.4 kg)              We Performed the Following     PAF COMPLETED        Primary Care Provider Office Phone # Fax #    Edwardo Linda Moser [phone] [address] [facility] [address]        Equal Access to Services     [facility]: Hadii aad jose hadasho Sosteffany, waaxda luqadaha, qaybta kaalmada garcia rodriguez . So [facility] [phone].    ATENCIÓN: Si habla español, tiene a norton disposición servicios gratuitos de asistencia lingüística. Llame al [phone].    We comply with applicable federal civil rights laws and [place] laws. We  do not discriminate on the basis of race, color, national origin, age, disability, sex, sexual orientation, or gender identity.            Thank you!     Thank you for choosing [facility]  for your care. Our goal is always to provide you with excellent care. Hearing back from our patients is one way we can continue to improve our services. Please take a few minutes to complete the written survey that you may receive in the mail after your visit with us. Thank you!             Your Updated Medication List - Protect others around you: Learn how to safely use, store and throw away your medicines at www.disposemymeds.org.          This list is accurate as of 11/29/18  1:38 PM.  Always use your most recent med list.                   Brand Name Dispense Instructions for use Diagnosis    ACE/ARB/ARNI NOT PRESCRIBED    INTENTIONAL     ACE & ARB not prescribed due to Intolerance and Other: hypokalemia x 2 episodes    Type 2 diabetes mellitus without complications (H), Hyponatremia       acetaminophen 500 MG tablet    MAPAP    100 tablet    TAKE 1 TABLET BY MOUTH EVERY 6 HOURS AS NEEDED    Chronic low back pain with sciatica, sciatica laterality unspecified, unspecified back pain laterality, Acute pain of right knee       albuterol 108 (90 Base) MCG/ACT inhaler    PROAIR HFA/PROVENTIL HFA/VENTOLIN HFA    1 Inhaler    Inhale 2 puffs into the lungs every 6 hours as needed for shortness of breath / dyspnea or wheezing    Wheezing       aspirin 81 MG tablet    ASA    30 tablet    Take 1 tablet (81 mg) by mouth daily Correct ASA    Hyperlipidemia LDL goal <100, Hypertension goal BP (blood pressure) < 140/80       aspirin-acetaminophen-caffeine 250-250-65 MG tablet    EXCEDRIN MIGRAINE    80 tablet    Take 1 tablet by mouth every 6 hours as needed for headaches    Cervicalgia       atorvastatin 20 MG tablet    LIPITOR    90 tablet    Take 1 tablet (20 mg) by mouth daily    Hyperlipidemia LDL goal  <100       blood glucose calibration solution    NO BRAND SPECIFIED    1 each    Use to calibrate blood glucose monitor as directed.    Type 2 diabetes mellitus without complication, without long-term current use of insulin (H)       blood glucose lancets standard    NO BRAND SPECIFIED    1 Box    Use to test blood sugar  dialy times daily or as directed.    Type 2 diabetes mellitus without complication, without long-term current use of insulin (H)       blood glucose monitoring meter device kit    NO BRAND SPECIFIED    1 kit    Use to test blood sugar  Twice daily  times daily or as directed.    Type 2 diabetes mellitus without complication, without long-term current use of insulin (H)       blood glucose monitoring test strip    NO BRAND SPECIFIED    100 strip    Use to test blood sugars  Daily  times daily or as directed    Type 2 diabetes mellitus without complication, without long-term current use of insulin (H)       carbidopa-levodopa  MG tablet    SINEMET    135 tablet    TAKE 1/2 TABLET BY MOUTH THREE TIMES DAILY    Tremor       finasteride 5 MG tablet    PROSCAR    90 tablet    Take 1 tablet (5 mg) by mouth daily    Hesitancy of micturition       gabapentin 100 MG capsule    NEURONTIN    180 capsule    Take 1 capsule (100 mg) by mouth 2 times daily    Cervicalgia       lidocaine 5 % external ointment    XYLOCAINE    142 g    One application 4 x daily to affected areas lower back and knees if necessary    Chronic right-sided low back pain with sciatica, sciatica laterality unspecified       metoprolol succinate ER 50 MG 24 hr tablet    TOPROL-XL    90 tablet    Take 1 tablet (50 mg) by mouth daily    Essential hypertension, benign       mupirocin 2 % nasal ointment    BACTROBAN    10 g    Spray 1 g into both nostrils 2 times daily    Nasal furuncle       neomycin-polymyxin-hydrocortisone 3.5-49640-3 otic suspension    CORTISPORIN    10 mL    Place 4 drops into both ears 4 times daily    Otalgia of  left ear       omeprazole 20 MG DR capsule    priLOSEC    90 capsule    TAKE ONE CAPSULE BY MOUTH ONCE DAILY EVERY MORNING ONE-HALF HOUR BEFORE A MEAL    Gastroesophageal reflux disease without esophagitis       polyethylene glycol 0.4%- propylene glycol 0.3% 0.4-0.3 % Soln ophthalmic solution    SYSTANE    1 Bottle    Place 1 drop into both eyes 4 times daily as needed for dry eyes    Dry eyes, bilateral       polyvinyl alcohol 1.4 % ophthalmic solution    ARTIFICIAL TEARS    15 mL    Place 1 drop into both eyes as needed for dry eyes    Dry eyes       tamsulosin 0.4 MG capsule    FLOMAX    90 capsule    TAKE 1 CAPSULE BY MOUTH EVERY DAY    Dysuria       triamcinolone 0.1 % external cream    KENALOG    80 g    Apply sparingly to affected area three times daily as needed    Blister of leg, left, initial encounter       vitamin D2 34168 units (1250 mcg) capsule    ERGOCALCIFEROL    12 capsule    TAKE 1 CAPSULE BY MOUTH EVERY WEEK    Vitamin D deficiency

## 2018-11-29 NOTE — PROGRESS NOTES
SUBJECTIVE:     Asha Sanford is a 83 year old male who presents to clinic today for the following health issues:            2 forms    Special Diet Request faxed to [phone]    [facility] Adult  faxed to [phone]    (E11.9) Type 2 diabetes mellitus without complication, without long-term current use of insulin (H)  (primary encounter diagnosis)    Comment:      Plan:          (I10) Hypertension goal BP (blood pressure) < 140/80    Comment:      Plan:  WELL CONTROLLED CURRENT MEDICATION         (E78.5) Hyperlipidemia LDL goal <100    Comment:      Plan:  ATORVASTATIN 20MG DAILY         (K21.9) Gastroesophageal reflux disease without esophagitis    Comment:      Plan:  PROTON PUMP INHIBITOR PRN         (E55.9) Vitamin D deficiency    Comment:      Plan:  VITAMIN D REPLACEMENT          (M47.812) Cervical spondylosis without myelopathy    Comment:      Plan:  CERVICAL DISC         (R41.3) Memory loss    Comment:      Plan:  MILD INTERMITTNE         (F51.01) Primary insomnia    Comment:       Plan:          (Z11.1) Screening examination for pulmonary tuberculosis    Comment:      Plan: XR Chest 1 View                   (E11.9) Encounter for diabetic foot exam (H)    Comment:      Plan:  DECREASE DORSAL PEDAL AND TIBIIAL PULSES BILATERAL     NORMAL MONOFILAMENT TESTING                 Problem list and histories reviewed & adjusted, as indicated.    Additional history: as documented          Patient Active Problem List   Diagnosis     Health Care Home     Dysuria     Essential and other specified forms of tremor     Abnormal involuntary movement     Lipoma     Cervical spondylosis without myelopathy     Degeneration of cervical intervertebral disc     Vitamin D deficiency     Memory loss     Obesity     Insomnia     Gastroesophageal reflux disease without esophagitis     Hyperlipidemia LDL goal <100     Pain in joint involving lower leg     Hypertension goal BP (blood pressure) < 140/80     Type  2 diabetes mellitus without complications (H)     Lumbago     Chronic pain of both knees     Headache     Nausea     UTI (urinary tract infection)     Lung nodule seen on imaging study     Bladder stones     ACP (advance care planning)     Hyponatremia     Tension headache     Encounter for diabetic foot exam (H)       Past Surgical History:   Procedure Laterality Date     CL AFF SURGICAL PATHOLOGY  7/1/2010     EYE SURGERY  2003    retinal detatch     SHOULDER SURGERY           Social History   Substance Use Topics     Smoking status: Never Smoker     Smokeless tobacco: Never Used     Alcohol use No       Family History   Problem Relation Age of Onset     Unknown/Adopted Mother      Unknown/Adopted Father              Current Outpatient Prescriptions   Medication Sig Dispense Refill     acetaminophen (MAPAP) 500 MG tablet TAKE 1 TABLET BY MOUTH EVERY 6 HOURS AS NEEDED 100 tablet 11     albuterol (PROAIR HFA/PROVENTIL HFA/VENTOLIN HFA) 108 (90 Base) MCG/ACT inhaler Inhale 2 puffs into the lungs every 6 hours as needed for shortness of breath / dyspnea or wheezing 1 Inhaler 0     aspirin 81 MG tablet Take 1 tablet (81 mg) by mouth daily Correct ASA 30 tablet 11     aspirin-acetaminophen-caffeine (EXCEDRIN MIGRAINE) 250-250-65 MG per tablet Take 1 tablet by mouth every 6 hours as needed for headaches 80 tablet 1     atorvastatin (LIPITOR) 20 MG tablet Take 1 tablet (20 mg) by mouth daily 90 tablet 3     blood glucose (NO BRAND SPECIFIED) lancets standard Use to test blood sugar  dialy times daily or as directed. 1 Box 0     blood glucose calibration (NO BRAND SPECIFIED) solution Use to calibrate blood glucose monitor as directed. 1 each 11     blood glucose monitoring (NO BRAND SPECIFIED) meter device kit Use to test blood sugar  Twice daily  times daily or as directed. 1 kit 0     blood glucose monitoring (NO BRAND SPECIFIED) test strip Use to test blood sugars  Daily  times daily or as directed 100 strip 0      carbidopa-levodopa (SINEMET)  MG per tablet TAKE 1/2 TABLET BY MOUTH THREE TIMES DAILY 135 tablet 3     finasteride (PROSCAR) 5 MG tablet Take 1 tablet (5 mg) by mouth daily 90 tablet 11     gabapentin (NEURONTIN) 100 MG capsule Take 1 capsule (100 mg) by mouth 2 times daily 180 capsule 11     lidocaine (XYLOCAINE) 5 % ointment One application 4 x daily to affected areas lower back and knees if necessary 142 g 11     metoprolol succinate (TOPROL-XL) 50 MG 24 hr tablet Take 1 tablet (50 mg) by mouth daily 90 tablet 3     mupirocin (BACTROBAN) 2 % nasal ointment Spray 1 g into both nostrils 2 times daily 10 g 3     neomycin-polymyxin-hydrocortisone (CORTISPORIN) 3.5-93250-1 otic suspension Place 4 drops into both ears 4 times daily 10 mL 3     omeprazole (PRILOSEC) 20 MG CR capsule TAKE ONE CAPSULE BY MOUTH ONCE DAILY EVERY MORNING ONE-HALF HOUR BEFORE A MEAL 90 capsule 3     polyethylene glycol 0.4%- propylene glycol 0.3% (SYSTANE) 0.4-0.3 % SOLN ophthalmic solution Place 1 drop into both eyes 4 times daily as needed for dry eyes 1 Bottle 11     polyvinyl alcohol (ARTIFICIAL TEARS) 1.4 % ophthalmic solution Place 1 drop into both eyes as needed for dry eyes 15 mL 11     tamsulosin (FLOMAX) 0.4 MG capsule TAKE 1 CAPSULE BY MOUTH EVERY DAY 90 capsule 11     triamcinolone (KENALOG) 0.1 % cream Apply sparingly to affected area three times daily as needed 80 g 0     vitamin D (ERGOCALCIFEROL) 96758 UNIT capsule TAKE 1 CAPSULE BY MOUTH EVERY WEEK 12 capsule 11     ACE/ARB NOT PRESCRIBED, INTENTIONAL, ACE & ARB not prescribed due to Intolerance and Other: hypokalemia x 2 episodes (Patient not taking: Reported on 7/18/2017)       No Known Allergies  Recent Labs   Lab Test  09/18/18   1636  06/01/18   1551  07/18/17   1049  08/22/16   1427   05/05/15   0818   A1C  5.4  5.6  5.4  5.7   < >   --    LDL   --   156*  128*  147*   < >   --    HDL   --   45  55  52   < >   --    TRIG   --   180*  123  144   < >   --    ALT    --   26  22  19   < >  26   CR   --   1.02  0.95  0.99   < >  0.86   GFRESTIMATED   --   70  76  73   < >  87   GFRESTBLACK   --   85  >90   GFR Calc    88   < >  >90   GFR Calc     POTASSIUM   --   4.3  4.3  4.8   < >  3.6   TSH   --    --   0.89   --    --   0.56    < > = values in this interval not displayed.        BP Readings from Last 3 Encounters:   11/29/18 138/70   09/18/18 124/70   06/01/18 126/74    Wt Readings from Last 3 Encounters:   11/29/18 162 lb (73.5 kg)   09/18/18 157 lb (71.2 kg)   06/01/18 153 lb (69.4 kg)                            Labs reviewed in EPIC        Reviewed and updated as needed this visit by clinical staff  Tobacco  Allergies  Meds  Problems  Med Hx  Surg Hx  Fam Hx  Soc Hx          Reviewed and updated as needed this visit by Provider  Allergies  Meds  Problems  Med Hx             ROS: has Health Care Home; Dysuria; Essential and other specified forms of tremor; Abnormal involuntary movement; Lipoma; Cervical spondylosis without myelopathy; Degeneration of cervical intervertebral disc; Vitamin D deficiency; Memory loss; Obesity; Insomnia; Gastroesophageal reflux disease without esophagitis; Hyperlipidemia LDL goal <100; Pain in joint involving lower leg; Hypertension goal BP (blood pressure) < 140/80; Type 2 diabetes mellitus without complications (H); Lumbago; Chronic pain of both knees; Headache; Nausea; UTI (urinary tract infection); Lung nodule seen on imaging study; Bladder stones; ACP (advance care planning); Hyponatremia; Tension headache; and Encounter for diabetic foot exam (H) on his problem list.        Constitutional, HEENT, cardiovascular, pulmonary, gi and gu systems are negative, except as otherwise noted.        OBJECTIVE:       /70  Pulse 88  Temp 97.8  F (36.6  C)  Resp 16  Wt 162 lb (73.5 kg)  SpO2 98%  BMI 27.81 kg/m2  Body mass index is 27.81 kg/(m^2).    GENERAL: healthy, alert and no distress    EYES:  Eyes grossly normal to inspection, PERRL and conjunctivae and sclerae normal    HENT: ear canals and TM's normal, nose and mouth without ulcers or lesions    NECK: no adenopathy, no asymmetry, masses, or scars and thyroid normal to palpation    RESP: lungs clear to auscultation - no rales, rhonchi or wheezes    CV: regular rate and rhythm, normal S1 S2, no S3 or S4, no murmur, click or rub, no peripheral edema and peripheral pulses strong    ABDOMEN: soft, nontender, no hepatosplenomegaly, no masses and bowel sounds normal    MS: no gross musculoskeletal defects noted, no edema    SKIN: no suspicious lesions or rashes    NEURO: Normal strength and tone, mentation intact and speech normal    PSYCH: mentation appears normal, affect normal/bright    Diabetic foot exam: no trophic changes or ulcerative lesions, normal sensory exam and  DECREASE POSTERIOR TIBIAL AND DORSAL PEDAL PULSES     NORMAL CIRCULATION MOTION AND SENSATION OTHERWISE     NORMAL MONOFILAMENT TESTING                 ASSESSMENT/PLAN:                 ICD-10-CM    1. Type 2 diabetes mellitus without complication, without long-term current use of insulin (H) E11.9    2. Hypertension goal BP (blood pressure) < 140/80 I10    3. Hyperlipidemia LDL goal <100 E78.5    4. Gastroesophageal reflux disease without esophagitis K21.9    5. Vitamin D deficiency E55.9    6. Cervical spondylosis without myelopathy M47.812    7. Memory loss R41.3    8. Primary insomnia F51.01    9. Screening examination for pulmonary tuberculosis Z11.1 XR Chest 1 View   10. Encounter for diabetic foot exam (H) E11.9          Patient Instructions     Current Outpatient Prescriptions   Medication     acetaminophen (MAPAP) 500 MG tablet     albuterol (PROAIR HFA/PROVENTIL HFA/VENTOLIN HFA) 108 (90 Base) MCG/ACT inhaler     aspirin 81 MG tablet     aspirin-acetaminophen-caffeine (EXCEDRIN MIGRAINE) 250-250-65 MG per tablet     atorvastatin (LIPITOR) 20 MG tablet     blood glucose (NO BRAND  SPECIFIED) lancets standard     blood glucose calibration (NO BRAND SPECIFIED) solution     blood glucose monitoring (NO BRAND SPECIFIED) meter device kit     blood glucose monitoring (NO BRAND SPECIFIED) test strip     carbidopa-levodopa (SINEMET)  MG per tablet     finasteride (PROSCAR) 5 MG tablet     gabapentin (NEURONTIN) 100 MG capsule     lidocaine (XYLOCAINE) 5 % ointment     metoprolol succinate (TOPROL-XL) 50 MG 24 hr tablet     mupirocin (BACTROBAN) 2 % nasal ointment     neomycin-polymyxin-hydrocortisone (CORTISPORIN) 3.5-73567-7 otic suspension     omeprazole (PRILOSEC) 20 MG CR capsule     polyethylene glycol 0.4%- propylene glycol 0.3% (SYSTANE) 0.4-0.3 % SOLN ophthalmic solution     polyvinyl alcohol (ARTIFICIAL TEARS) 1.4 % ophthalmic solution     tamsulosin (FLOMAX) 0.4 MG capsule     triamcinolone (KENALOG) 0.1 % cream     vitamin D (ERGOCALCIFEROL) 29951 UNIT capsule     ACE/ARB NOT PRESCRIBED, INTENTIONAL,     No current facility-administered medications for this visit.      Patient Active Problem List   Diagnosis     Health Care Home     Dysuria     Essential and other specified forms of tremor     Abnormal involuntary movement     Lipoma     Cervical spondylosis without myelopathy     Degeneration of cervical intervertebral disc     Vitamin D deficiency     Memory loss     Obesity     Insomnia     Gastroesophageal reflux disease without esophagitis     Hyperlipidemia LDL goal <100     Pain in joint involving lower leg     Hypertension goal BP (blood pressure) < 140/80     Type 2 diabetes mellitus without complications (H)     Lumbago     Chronic pain of both knees     Headache     Nausea     UTI (urinary tract infection)     Lung nodule seen on imaging study     Bladder stones     ACP (advance care planning)     Hyponatremia     Tension headache               AMY BURNS MD    [facility]

## 2018-11-30 ASSESSMENT — ANXIETY QUESTIONNAIRES: GAD7 TOTAL SCORE: 5

## 2019-01-29 ENCOUNTER — OFFICE VISIT (OUTPATIENT)
Dept: FAMILY MEDICINE | Facility: CLINIC | Age: 84
End: 2019-01-29
Payer: COMMERCIAL

## 2019-01-29 VITALS
SYSTOLIC BLOOD PRESSURE: 136 MMHG | HEART RATE: 71 BPM | OXYGEN SATURATION: 99 % | BODY MASS INDEX: 27.81 KG/M2 | DIASTOLIC BLOOD PRESSURE: 66 MMHG | TEMPERATURE: 96.7 F | WEIGHT: 162 LBS | RESPIRATION RATE: 16 BRPM

## 2019-01-29 DIAGNOSIS — M25.561 CHRONIC PAIN OF BOTH KNEES: ICD-10-CM

## 2019-01-29 DIAGNOSIS — J98.4 CALCIFIED GRANULOMA OF LUNG: ICD-10-CM

## 2019-01-29 DIAGNOSIS — E78.5 HYPERLIPIDEMIA LDL GOAL <100: ICD-10-CM

## 2019-01-29 DIAGNOSIS — E11.9 TYPE 2 DIABETES MELLITUS WITHOUT COMPLICATION, WITHOUT LONG-TERM CURRENT USE OF INSULIN (H): Primary | ICD-10-CM

## 2019-01-29 DIAGNOSIS — E87.1 HYPONATREMIA: ICD-10-CM

## 2019-01-29 DIAGNOSIS — M25.562 CHRONIC PAIN OF BOTH KNEES: ICD-10-CM

## 2019-01-29 DIAGNOSIS — G89.29 CHRONIC PAIN OF BOTH KNEES: ICD-10-CM

## 2019-01-29 PROCEDURE — 99213 OFFICE O/P EST LOW 20 MIN: CPT | Performed by: FAMILY MEDICINE

## 2019-01-29 NOTE — PATIENT INSTRUCTIONS
(E11.9) Type 2 diabetes mellitus without complication, without long-term current use of insulin (H)  (primary encounter diagnosis)  Comment:    Plan:       (E78.5) Hyperlipidemia LDL goal <100  Comment:    Plan:      (J84.10) Calcified granuloma of lung (H)  Comment:    Plan:  MONITORING NEEDED     (E87.1) Hyponatremia  Comment:    Plan:  Resolved      (M25.561,  M25.562,  G89.29) Chronic pain of both knees  Comment:    Plan:   OSTEOARTHRITIS   DIABETIC NEUROPATHY

## 2019-01-29 NOTE — PROGRESS NOTES
SUBJECTIVE:   Asha Sanford is a 84 year old male who presents to clinic today for the following health issues:      Special diet request                   2 forms    Special Diet Request faxed to [phone]    [facility]  faxed to [phone]    (E11.9) Type 2 diabetes mellitus without complication, without long-term current use of insulin (H)  (primary encounter diagnosis)    Comment:      Plan:          (I10) Hypertension goal BP (blood pressure) < 140/80    Comment:      Plan:  WELL CONTROLLED CURRENT MEDICATION         (E78.5) Hyperlipidemia LDL goal <100    Comment:      Plan:  ATORVASTATIN 20MG DAILY         (K21.9) Gastroesophageal reflux disease without esophagitis    Comment:      Plan:  PROTON PUMP INHIBITOR PRN         (E55.9) Vitamin D deficiency    Comment:      Plan:  VITAMIN D REPLACEMENT          (M47.812) Cervical spondylosis without myelopathy    Comment:      Plan:  CERVICAL DISC         (R41.3) Memory loss    Comment:      Plan:  MILD INTERMITTNE         (F51.01) Primary insomnia    Comment:       Plan:          (Z11.1) Screening examination for pulmonary tuberculosis    Comment:      Plan: XR Chest 1 View                   (E11.9) Encounter for diabetic foot exam (H)    Comment:      Plan:  DECREASE DORSAL PEDAL AND TIBIIAL PULSES BILATERAL     NORMAL MONOFILAMENT TESTING                 Problem list and histories reviewed & adjusted, as indicated.    Additional history: as documented          Patient Active Problem List   Diagnosis     Health Care Home     Dysuria     Essential and other specified forms of tremor     Abnormal involuntary movement     Lipoma     Cervical spondylosis without myelopathy     Degeneration of cervical intervertebral disc     Vitamin D deficiency     Memory loss     Obesity     Insomnia     Gastroesophageal reflux disease without esophagitis     Hyperlipidemia LDL goal <100     Pain in joint involving lower leg     Hypertension goal BP (blood  pressure) < 140/80     Type 2 diabetes mellitus without complications (H)     Lumbago     Chronic pain of both knees     Headache     Nausea     UTI (urinary tract infection)     Lung nodule seen on imaging study     Bladder stones     ACP (advance care planning)     Hyponatremia     Tension headache     Encounter for diabetic foot exam (H)     Calcified granuloma of lung (H)       Past Surgical History:   Procedure Laterality Date     CL AFF SURGICAL PATHOLOGY  7/1/2010     EYE SURGERY  2003    retinal detatch     SHOULDER SURGERY           Social History     Tobacco Use     Smoking status: Never Smoker     Smokeless tobacco: Never Used   Substance Use Topics     Alcohol use: No       Family History   Problem Relation Age of Onset     Unknown/Adopted Mother      Unknown/Adopted Father              Current Outpatient Medications   Medication Sig Dispense Refill     acetaminophen (MAPAP) 500 MG tablet TAKE 1 TABLET BY MOUTH EVERY 6 HOURS AS NEEDED 100 tablet 11     albuterol (PROAIR HFA/PROVENTIL HFA/VENTOLIN HFA) 108 (90 Base) MCG/ACT inhaler Inhale 2 puffs into the lungs every 6 hours as needed for shortness of breath / dyspnea or wheezing 1 Inhaler 0     aspirin 81 MG tablet Take 1 tablet (81 mg) by mouth daily Correct ASA 30 tablet 11     aspirin-acetaminophen-caffeine (EXCEDRIN MIGRAINE) 250-250-65 MG per tablet Take 1 tablet by mouth every 6 hours as needed for headaches 80 tablet 1     atorvastatin (LIPITOR) 20 MG tablet Take 1 tablet (20 mg) by mouth daily 90 tablet 3     blood glucose (NO BRAND SPECIFIED) lancets standard Use to test blood sugar  dialy times daily or as directed. 1 Box 0     blood glucose calibration (NO BRAND SPECIFIED) solution Use to calibrate blood glucose monitor as directed. 1 each 11     blood glucose monitoring (NO BRAND SPECIFIED) meter device kit Use to test blood sugar  Twice daily  times daily or as directed. 1 kit 0     blood glucose monitoring (NO BRAND SPECIFIED) test strip  Use to test blood sugars  Daily  times daily or as directed 100 strip 0     carbidopa-levodopa (SINEMET)  MG per tablet TAKE 1/2 TABLET BY MOUTH THREE TIMES DAILY 135 tablet 3     finasteride (PROSCAR) 5 MG tablet Take 1 tablet (5 mg) by mouth daily 90 tablet 11     gabapentin (NEURONTIN) 100 MG capsule Take 1 capsule (100 mg) by mouth 2 times daily 180 capsule 11     lidocaine (XYLOCAINE) 5 % ointment One application 4 x daily to affected areas lower back and knees if necessary 142 g 11     metoprolol succinate (TOPROL-XL) 50 MG 24 hr tablet Take 1 tablet (50 mg) by mouth daily 90 tablet 3     mupirocin (BACTROBAN) 2 % nasal ointment Spray 1 g into both nostrils 2 times daily 10 g 3     neomycin-polymyxin-hydrocortisone (CORTISPORIN) 3.5-57228-0 otic suspension Place 4 drops into both ears 4 times daily 10 mL 3     omeprazole (PRILOSEC) 20 MG CR capsule TAKE ONE CAPSULE BY MOUTH ONCE DAILY EVERY MORNING ONE-HALF HOUR BEFORE A MEAL 90 capsule 3     polyethylene glycol 0.4%- propylene glycol 0.3% (SYSTANE) 0.4-0.3 % SOLN ophthalmic solution Place 1 drop into both eyes 4 times daily as needed for dry eyes 1 Bottle 11     polyvinyl alcohol (ARTIFICIAL TEARS) 1.4 % ophthalmic solution Place 1 drop into both eyes as needed for dry eyes 15 mL 11     tamsulosin (FLOMAX) 0.4 MG capsule TAKE 1 CAPSULE BY MOUTH EVERY DAY 90 capsule 11     triamcinolone (KENALOG) 0.1 % cream Apply sparingly to affected area three times daily as needed 80 g 0     vitamin D (ERGOCALCIFEROL) 90873 UNIT capsule TAKE 1 CAPSULE BY MOUTH EVERY WEEK 12 capsule 11     ACE/ARB NOT PRESCRIBED, INTENTIONAL, ACE & ARB not prescribed due to Intolerance and Other: hypokalemia x 2 episodes (Patient not taking: Reported on 7/18/2017)       No Known Allergies  Recent Labs   Lab Test 09/18/18  1636 06/01/18  1551 07/18/17  1049 08/22/16  1427  05/05/15  0818   A1C 5.4 5.6 5.4 5.7   < >  --    LDL  --  156* 128* 147*   < >  --    HDL  --  45 55 52   < >  --     TRIG  --  180* 123 144   < >  --    ALT  --  26 22 19   < > 26   CR  --  1.02 0.95 0.99   < > 0.86   GFRESTIMATED  --  70 76 73   < > 87   GFRESTBLACK  --  85 >90   GFR Calc   88   < > >90   GFR Calc     POTASSIUM  --  4.3 4.3 4.8   < > 3.6   TSH  --   --  0.89  --   --  0.56    < > = values in this interval not displayed.        BP Readings from Last 3 Encounters:   01/29/19 136/66   11/29/18 138/70   09/18/18 124/70    Wt Readings from Last 3 Encounters:   01/29/19 73.5 kg (162 lb)   11/29/18 73.5 kg (162 lb)   09/18/18 71.2 kg (157 lb)                            Labs reviewed in EPIC        Reviewed and updated as needed this visit by clinical staff  Tobacco  Allergies  Meds         Reviewed and updated as needed this visit by Provider             ROS: has Health Care Home; Dysuria; Essential and other specified forms of tremor; Abnormal involuntary movement; Lipoma; Cervical spondylosis without myelopathy; Degeneration of cervical intervertebral disc; Vitamin D deficiency; Memory loss; Obesity; Insomnia; Gastroesophageal reflux disease without esophagitis; Hyperlipidemia LDL goal <100; Pain in joint involving lower leg; Hypertension goal BP (blood pressure) < 140/80; Type 2 diabetes mellitus without complications (H); Lumbago; Chronic pain of both knees; Headache; Nausea; UTI (urinary tract infection); Lung nodule seen on imaging study; Bladder stones; ACP (advance care planning); Hyponatremia; Tension headache; and Encounter for diabetic foot exam (H) on his problem list.        Constitutional, HEENT, cardiovascular, pulmonary, gi and gu systems are negative, except as otherwise noted.        OBJECTIVE:       /66   Pulse 71   Temp 96.7  F (35.9  C) (Tympanic)   Resp 16   Wt 73.5 kg (162 lb)   SpO2 99%   BMI 27.81 kg/m    Body mass index is 27.81 kg/m .    GENERAL: healthy, alert and no distress    EYES: Eyes grossly normal to inspection, PERRL and conjunctivae  and sclerae normal    HENT: ear canals and TM's normal, nose and mouth without ulcers or lesions    NECK: no adenopathy, no asymmetry, masses, or scars and thyroid normal to palpation    RESP: lungs clear to auscultation - no rales, rhonchi or wheezes    CV: regular rate and rhythm, normal S1 S2, no S3 or S4, no murmur, click or rub, no peripheral edema and peripheral pulses strong    ABDOMEN: soft, nontender, no hepatosplenomegaly, no masses and bowel sounds normal    MS: no gross musculoskeletal defects noted, no edema    SKIN: no suspicious lesions or rashes    NEURO: Normal strength and tone, mentation intact and speech normal    PSYCH: mentation appears normal, affect normal/bright    Diabetic foot exam: no trophic changes or ulcerative lesions, normal sensory exam and  DECREASE POSTERIOR TIBIAL AND DORSAL PEDAL PULSES     NORMAL CIRCULATION MOTION AND SENSATION OTHERWISE     NORMAL MONOFILAMENT TESTING                 ASSESSMENT/PLAN:                 ICD-10-CM    1. Type 2 diabetes mellitus without complication, without long-term current use of insulin (H) E11.9    2. Hyperlipidemia LDL goal <100 E78.5    3. Calcified granuloma of lung (H) J84.10    4. Hyponatremia E87.1    5. Chronic pain of both knees M25.561     M25.562     G89.29          Patient Instructions   (E11.9) Type 2 diabetes mellitus without complication, without long-term current use of insulin (H)  (primary encounter diagnosis)  Comment:    Plan:       (E78.5) Hyperlipidemia LDL goal <100  Comment:    Plan:      (J84.10) Calcified granuloma of lung (H)  Comment:    Plan:  MONITORING NEEDED     (E87.1) Hyponatremia  Comment:    Plan:  Resolved      (M25.561,  M25.562,  G89.29) Chronic pain of both knees  Comment:    Plan:   OSTEOARTHRITIS   DIABETIC NEUROPATHY                AMY BURNS MD    [facility]   i

## 2019-03-06 ENCOUNTER — PATIENT OUTREACH (OUTPATIENT)
Dept: GERIATRIC MEDICINE | Facility: CLINIC | Age: 84
End: 2019-03-06

## 2019-03-06 NOTE — PROGRESS NOTES
[facility] Care Coordination Contact      [facility] Six-Month Telephone Assessment    6 month telephone assessment completed on 3/6/19.    ER visits: No  Hospitalizations: No  TCU stays: No  Significant health status changes: No  Falls/Injuries: No  ADL/IADL changes: No  Changes in services: No    Caregiver Assessment follow up:  N/A.  Has paid caregiver.     Goals: See POC in chart for goal progress documentation.      Will see member in 6 months for an annual health risk assessment.   Encouraged member to call CC with any questions or concerns in the meantime.     Michelle Melchor RN BSN PHN  [facility] Care Coordinator  [phone]  Fax:[phone]

## 2019-05-28 ENCOUNTER — PATIENT OUTREACH (OUTPATIENT)
Dept: GERIATRIC MEDICINE | Facility: CLINIC | Age: 84
End: 2019-05-28

## 2019-05-28 NOTE — LETTER
May 28, 2019    ALFREDO ALEMAN  [address]  [address]  [place] [address]      Dear Alfredo:    As a member of [facility] ([facility]) ([facility]), you are provided a care coordinator. I will be your new care coordinator as of 6/1/19. I will be calling you soon to see how you are doing and determine your needs.    If you have any questions, please feel free to call me at [phone]. If you reach my voice mail, please leave a message and your phone number. If you are hearing impaired, please call the [place] [employer] at 154 or [phone] ([id number] relay service).    I look forward to speaking with you soon.    Sincerely,    Daphne Ochoa, CRISTY, RN, PHN    E-mail: [email or web address]  Phone: [phone]      [facility] is a health plan that contracts with both Medicare and the [place] Medical Assistance (Medicaid) program to provide benefits of both programs to enrollees. Enrollment in [facility] depends on contract renewal.      [facility]  [id number] DHS Approved ([id number])  [id number] (11/18)

## 2019-05-28 NOTE — PROGRESS NOTES
[facility] Care Coordination Contact    6/1/19 Internal CC change.  CC change letter sent.  Allyson Henry  Case Management Specialist  [facility]  [phone]

## 2019-06-04 DIAGNOSIS — R25.1 TREMOR: ICD-10-CM

## 2019-06-04 DIAGNOSIS — E55.9 VITAMIN D DEFICIENCY: ICD-10-CM

## 2019-06-04 NOTE — TELEPHONE ENCOUNTER
"Requested Prescriptions   Pending Prescriptions Disp Refills     carbidopa-levodopa (SINEMET)  MG tablet  Last Written Prescription Date:  9/18/2018  Last Fill Quantity: 135 tablet,  # refills: 3   Last office visit: 1/29/2019 with prescribing provider:  VAHID Moser   Future Office Visit:     135 tablet 3     Sig: TAKE 1/2 TABLET BY MOUTH THREE TIMES DAILY       Antiparkinson's Agents Protocol Passed - 6/4/2019  2:20 PM        Passed - Recent (12 mo) or future (30 days) visit within the authorizing provider's specialty     Patient had office visit in the last 12 months or has a visit in the next 30 days with authorizing provider or within the authorizing provider's specialty.  See \"Patient Info\" tab in inbasket, or \"Choose Columns\" in Meds & Orders section of the refill encounter.              Passed - Medication is active on med list        Passed - Patient is age 18 or older        vitamin D (ERGOCALCIFEROL) 40665 UNIT capsule   Last Written Prescription Date:  6/1/2018  Last Fill Quantity: 12 capsule,  # refills: 11   Last office visit: 1/29/2019 with prescribing provider:  VAHID Moser   Future Office Visit:        Routing refill request to provider for review/approval because:  Drug not on the G, P or  Health refill protocol or controlled substance                           "

## 2019-06-05 DIAGNOSIS — M54.40 CHRONIC LOW BACK PAIN WITH SCIATICA, SCIATICA LATERALITY UNSPECIFIED, UNSPECIFIED BACK PAIN LATERALITY: Chronic | ICD-10-CM

## 2019-06-05 DIAGNOSIS — G89.29 CHRONIC LOW BACK PAIN WITH SCIATICA, SCIATICA LATERALITY UNSPECIFIED, UNSPECIFIED BACK PAIN LATERALITY: Chronic | ICD-10-CM

## 2019-06-05 DIAGNOSIS — M25.561 ACUTE PAIN OF RIGHT KNEE: Chronic | ICD-10-CM

## 2019-06-05 RX ORDER — ACETAMINOPHEN 500 MG
TABLET ORAL
Qty: 100 TABLET | Refills: 5 | Status: SHIPPED | OUTPATIENT
Start: 2019-06-05 | End: 2019-10-08

## 2019-06-05 RX ORDER — CARBIDOPA AND LEVODOPA 25; 100 MG/1; MG/1
TABLET ORAL
Qty: 135 TABLET | Refills: 1 | Status: SHIPPED | OUTPATIENT
Start: 2019-06-05 | End: 2019-10-08

## 2019-06-05 RX ORDER — ERGOCALCIFEROL 1.25 MG/1
CAPSULE, LIQUID FILLED ORAL
Qty: 12 CAPSULE | Refills: 11 | Status: SHIPPED | OUTPATIENT
Start: 2019-06-05 | End: 2023-11-01

## 2019-06-05 NOTE — TELEPHONE ENCOUNTER
"Requested Prescriptions   Pending Prescriptions Disp Refills     acetaminophen (MAPAP) 500 MG tablet  Last Written Prescription Date:  9/18/2018  Last Fill Quantity: 100 tablet,  # refills: 11   Last office visit: 1/29/2019 with prescribing provider:  VAHID Moser   Future Office Visit:     100 tablet 11     Sig: TAKE 1 TABLET BY MOUTH EVERY 6 HOURS AS NEEDED       Analgesics (Non-Narcotic Tylenol and ASA Only) Passed - 6/5/2019 10:01 AM        Passed - Recent (12 mo) or future (30 days) visit within the authorizing provider's specialty     Patient had office visit in the last 12 months or has a visit in the next 30 days with authorizing provider or within the authorizing provider's specialty.  See \"Patient Info\" tab in inbasket, or \"Choose Columns\" in Meds & Orders section of the refill encounter.              Passed - Patient is 7 months old or older     If patient is a peds patient of the age 7 mos -12 years, ok to refill using weight-based dosing.     If >3g daily and/or sig is not \"prn\", check for liver enzymes. If normal in the last year, ok to refill.  If not, refer to the provider.          Passed - Medication is active on med list           "

## 2019-06-18 ENCOUNTER — PATIENT OUTREACH (OUTPATIENT)
Dept: GERIATRIC MEDICINE | Facility: CLINIC | Age: 84
End: 2019-06-18

## 2019-06-18 NOTE — PROGRESS NOTES
[facility] Care Coordination Contact    Internal change from CC Michelle Melchor RN as of 06/01/2019.  Placed a call to member and introduced self as new CC.  Last assessment was on 09/08/2019, chart reviewed and no changes noted.   Contact information provided and encouraged to call with any questions/concerns.  Daphne Ochoa RN BSN PHN  [facility]   RN Care Coordinator   Phone:   [phone]  Fax:       [phone]

## 2019-07-03 DIAGNOSIS — I10 ESSENTIAL HYPERTENSION, BENIGN: ICD-10-CM

## 2019-07-03 NOTE — TELEPHONE ENCOUNTER
"Requested Prescriptions   Pending Prescriptions Disp Refills     metoprolol succinate ER (TOPROL-XL) 50 MG 24 hr tablet  Last Written Prescription Date:  9/18/2018  Last Fill Quantity: 90 tablet,  # refills: 3   Last office visit: 1/29/2019 with prescribing provider:  VAHID Moser   Future Office Visit:     90 tablet 3     Sig: Take 1 tablet (50 mg) by mouth daily       Beta-Blockers Protocol Passed - 7/3/2019  4:21 PM        Passed - Blood pressure under 140/90 in past 12 months     BP Readings from Last 3 Encounters:   01/29/19 136/66   11/29/18 138/70   09/18/18 124/70                 Passed - Patient is age 6 or older        Passed - Recent (12 mo) or future (30 days) visit within the authorizing provider's specialty     Patient had office visit in the last 12 months or has a visit in the next 30 days with authorizing provider or within the authorizing provider's specialty.  See \"Patient Info\" tab in inbasket, or \"Choose Columns\" in Meds & Orders section of the refill encounter.              Passed - Medication is active on med list           "

## 2019-07-05 RX ORDER — METOPROLOL SUCCINATE 50 MG/1
50 TABLET, EXTENDED RELEASE ORAL DAILY
Qty: 90 TABLET | Refills: 2 | Status: SHIPPED | OUTPATIENT
Start: 2019-07-05 | End: 2019-10-08

## 2019-07-05 NOTE — TELEPHONE ENCOUNTER
Prescription approved per [facility] Refill Protocol for 12 months of refills since last appointment, which was 1/19/2019.

## 2019-07-15 ENCOUNTER — OFFICE VISIT (OUTPATIENT)
Dept: FAMILY MEDICINE | Facility: CLINIC | Age: 84
End: 2019-07-15
Payer: COMMERCIAL

## 2019-07-15 VITALS
BODY MASS INDEX: 27.46 KG/M2 | HEART RATE: 63 BPM | TEMPERATURE: 98.1 F | SYSTOLIC BLOOD PRESSURE: 116 MMHG | DIASTOLIC BLOOD PRESSURE: 57 MMHG | WEIGHT: 160 LBS | OXYGEN SATURATION: 96 %

## 2019-07-15 DIAGNOSIS — M25.562 CHRONIC PAIN OF BOTH KNEES: ICD-10-CM

## 2019-07-15 DIAGNOSIS — G89.29 CHRONIC PAIN OF BOTH KNEES: ICD-10-CM

## 2019-07-15 DIAGNOSIS — E78.5 HYPERLIPIDEMIA LDL GOAL <100: Chronic | ICD-10-CM

## 2019-07-15 DIAGNOSIS — E11.9 TYPE 2 DIABETES MELLITUS WITHOUT COMPLICATION, WITHOUT LONG-TERM CURRENT USE OF INSULIN (H): Primary | Chronic | ICD-10-CM

## 2019-07-15 DIAGNOSIS — M25.561 CHRONIC PAIN OF BOTH KNEES: ICD-10-CM

## 2019-07-15 DIAGNOSIS — H53.9 VISUAL DISTURBANCE: ICD-10-CM

## 2019-07-15 LAB — HBA1C MFR BLD: 5.3 % (ref 0–5.6)

## 2019-07-15 PROCEDURE — 80053 COMPREHEN METABOLIC PANEL: CPT | Performed by: FAMILY MEDICINE

## 2019-07-15 PROCEDURE — 82043 UR ALBUMIN QUANTITATIVE: CPT | Performed by: FAMILY MEDICINE

## 2019-07-15 PROCEDURE — 99214 OFFICE O/P EST MOD 30 MIN: CPT | Performed by: FAMILY MEDICINE

## 2019-07-15 PROCEDURE — 36415 COLL VENOUS BLD VENIPUNCTURE: CPT | Performed by: FAMILY MEDICINE

## 2019-07-15 PROCEDURE — 80061 LIPID PANEL: CPT | Performed by: FAMILY MEDICINE

## 2019-07-15 PROCEDURE — 83036 HEMOGLOBIN GLYCOSYLATED A1C: CPT | Performed by: FAMILY MEDICINE

## 2019-07-15 NOTE — PROGRESS NOTES
Subjective     Asha Sanford is a 84 year old male who presents to clinic today for the following health issues:    HPI   Eye(s) Problem      Duration: 2 month    Description:  Location: right  Pain: YES- at evening, feels like something is moving around  Redness: YES  Discharge: no     Accompanying signs and symptoms: discomfort    History (Trauma, foreign body exposure,): None    Precipitating or alleviating factors (contact use): None, pt would like a referral to eye dr    Therapies tried and outcome: None    Pt has a form for adult  medical report    Needs form updated for Adult  [facility]    Diabetes Follow-up      How often are you checking your blood sugar? Not at all    What time of day are you checking your blood sugars (select all that apply)?  Before meals    Have you had any blood sugars above 200?  No    Have you had any blood sugars below 70?  No    What symptoms do you notice when your blood sugar is low?  None    What concerns do you have today about your diabetes? None     Do you have any of these symptoms? (Select all that apply)  No numbness or tingling in feet.  No redness, sores or blisters on feet.  No complaints of excessive thirst.  No reports of blurry vision.  No significant changes to weight.     Have you had a diabetic eye exam in the last 12 months? No    BP Readings from Last 2 Encounters:   07/15/19 116/57   01/29/19 136/66     Hemoglobin A1C (%)   Date Value   09/18/2018 5.4   06/01/2018 5.6     LDL Cholesterol Calculated (mg/dL)   Date Value   06/01/2018 156 (H)   07/18/2017 128 (H)       Diabetes Management Resources  Hyperlipidemia Follow-Up      Are you having any of the following symptoms? (Select all that apply)  No complaints of shortness of breath, chest pain or pressure.  No increased sweating or nausea with activity.  No left-sided neck or arm pain.  No complaints of pain in calves when walking 1-2 blocks.    Are you regularly taking  any medication or supplement to lower your cholesterol?   Yes- Atorvastatin    Are you having muscle aches or other side effects that you think could be caused by your cholesterol lowering medication?  No        Recent Labs   Lab Test 09/18/18  1636 06/01/18  1551 07/18/17  1049 08/22/16  1427  05/05/15  0818   A1C 5.4 5.6 5.4 5.7   < >  --    LDL  --  156* 128* 147*   < >  --    HDL  --  45 55 52   < >  --    TRIG  --  180* 123 144   < >  --    ALT  --  26 22 19   < > 26   CR  --  1.02 0.95 0.99   < > 0.86   GFRESTIMATED  --  70 76 73   < > 87   GFRESTBLACK  --  85 >90   GFR Calc   88   < > >90   GFR Calc     POTASSIUM  --  4.3 4.3 4.8   < > 3.6   TSH  --   --  0.89  --   --  0.56    < > = values in this interval not displayed.      BP Readings from Last 3 Encounters:   07/15/19 116/57   01/29/19 136/66   11/29/18 138/70    Wt Readings from Last 3 Encounters:   07/15/19 72.6 kg (160 lb)   01/29/19 73.5 kg (162 lb)   11/29/18 73.5 kg (162 lb)                      Reviewed and updated as needed this visit by Provider         Review of Systems   ROS COMP: CONSTITUTIONAL: NEGATIVE for fever, chills, change in weight  EYES: POSITIVE for blurred vision right  ENT/MOUTH: NEGATIVE for ear, mouth and throat problems  RESP: NEGATIVE for significant cough or SOB  CV: NEGATIVE for chest pain, palpitations or peripheral edema  NEURO: NEGATIVE for weakness, dizziness or paresthesias  ENDOCRINE: NEGATIVE for temperature intolerance, skin/hair changes and POSITIVE  for HX diabetes      Objective    /57 (Cuff Size: Adult Large)   Pulse 63   Temp 98.1  F (36.7  C) (Tympanic)   Wt 72.6 kg (160 lb)   SpO2 96%   BMI 27.46 kg/m    Body mass index is 27.46 kg/m .  Physical Exam   GENERAL: healthy, alert and no distress  EYES: Eyes grossly normal to inspection, PERRL and fundi benign-no diabetic or hypertensive changes seen  NECK: no adenopathy, no asymmetry, masses, or scars and thyroid normal  to palpation  RESP: lungs clear to auscultation - no rales, rhonchi or wheezes  CV: regular rate and rhythm, normal S1 S2, no S3 or S4, no murmur, click or rub, no peripheral edema and peripheral pulses strong  ABDOMEN: soft, nontender, no hepatosplenomegaly, no masses and bowel sounds normal  MS: no gross musculoskeletal defects noted, no edema  NEURO: Normal strength and tone, mentation intact and speech normal    Diagnostic Test Results:  Labs reviewed in Epic  Lab: see below, results pending        Assessment & Plan     Asha was seen today for eye pain and forms.    Diagnoses and all orders for this visit:    Type 2 diabetes mellitus without complication, without long-term current use of insulin (H)  -     Hemoglobin A1c  -     Comprehensive metabolic panel  -     Lipid panel reflex to direct LDL Non-fasting  -     Albumin Random Urine Quantitative with Creat Ratio    Chronic pain of both knees    Hyperlipidemia LDL goal <100  -     Lipid panel reflex to direct LDL Non-fasting    Visual disturbance  -     OPHTHALMOLOGY ADULT REFERRAL       Form for Adult Day Care completed    FUTURE APPOINTMENTS:       - Follow-up visit in 6 mo   Patient Instructions   Call to schedule eye exam at [facility]      Return in about 6 months (around 1/15/2020) for Diabetes, hypercholesterol.    Stefan Avina MD  [facility]

## 2019-07-15 NOTE — LETTER
July 17, 2019      Asha Sanford  [address]  [facility] [address]        Dear ,    We are writing to inform you of your test results.    Albumin level in urine is normal  Metabolic panel is normal  Cholesterol panel shows LDL improved from last test, below goal of 130 on Atorvastatin  Hemoglobin A1c shows diabetes in very good control      Resulted Orders   Hemoglobin A1c   Result Value Ref Range    Hemoglobin A1C 5.3 0 - 5.6 %      Comment:      Normal <5.7% Prediabetes 5.7-6.4%  Diabetes 6.5% or higher - adopted from ADA   consensus guidelines.     Comprehensive metabolic panel   Result Value Ref Range    Sodium 142 133 - 144 mmol/L    Potassium 4.5 3.4 - 5.3 mmol/L    Chloride 109 94 - 109 mmol/L    Carbon Dioxide 28 20 - 32 mmol/L    Anion Gap 5 3 - 14 mmol/L    Glucose 91 70 - 99 mg/dL    Urea Nitrogen 18 7 - 30 mg/dL    Creatinine 1.05 0.66 - 1.25 mg/dL    GFR Estimate 66 >60 mL/min/[1.73_m2]      Comment:      Non  GFR Calc  Starting 12/18/2018, serum creatinine based estimated GFR (eGFR) will be   calculated using the Chronic Kidney Disease Epidemiology Collaboration   (CKD-EPI) equation.      GFR Estimate If Black 76 >60 mL/min/[1.73_m2]      Comment:       GFR Calc  Starting 12/18/2018, serum creatinine based estimated GFR (eGFR) will be   calculated using the Chronic Kidney Disease Epidemiology Collaboration   (CKD-EPI) equation.      Calcium 9.2 8.5 - 10.1 mg/dL    Bilirubin Total 0.2 0.2 - 1.3 mg/dL    Albumin 3.5 3.4 - 5.0 g/dL    Protein Total 6.9 6.8 - 8.8 g/dL    Alkaline Phosphatase 79 40 - 150 U/L    ALT 21 0 - 70 U/L    AST 14 0 - 45 U/L   Lipid panel reflex to direct LDL Non-fasting   Result Value Ref Range    Cholesterol 201 (H) <200 mg/dL      Comment:      Desirable:       <200 mg/dl    Triglycerides 154 (H) <150 mg/dL      Comment:      Borderline high:  150-199 mg/dl  High:             200-499 mg/dl  Very high:        >499 mg/dl      HDL Cholesterol 48 >39 mg/dL    LDL Cholesterol Calculated 122 (H) <100 mg/dL      Comment:      Above desirable:  100-129 mg/dl  Borderline High:  130-159 mg/dL  High:             160-189 mg/dL  Very high:       >189 mg/dl      Non HDL Cholesterol 153 (H) <130 mg/dL      Comment:      Above Desirable:  130-159 mg/dl  Borderline high:  160-189 mg/dl  High:             190-219 mg/dl  Very high:       >219 mg/dl     Albumin Random Urine Quantitative with Creat Ratio   Result Value Ref Range    Creatinine Urine 135 mg/dL    Albumin Urine mg/L 10 mg/L    Albumin Urine mg/g Cr 7.40 0 - 17 mg/g Cr               If you have any questions or concerns, please call the clinic at the number listed above.       Sincerely,        Stefan Avina MD

## 2019-07-16 LAB
ALBUMIN SERPL-MCNC: 3.5 G/DL (ref 3.4–5)
ALP SERPL-CCNC: 79 U/L (ref 40–150)
ALT SERPL W P-5'-P-CCNC: 21 U/L (ref 0–70)
ANION GAP SERPL CALCULATED.3IONS-SCNC: 5 MMOL/L (ref 3–14)
AST SERPL W P-5'-P-CCNC: 14 U/L (ref 0–45)
BILIRUB SERPL-MCNC: 0.2 MG/DL (ref 0.2–1.3)
BUN SERPL-MCNC: 18 MG/DL (ref 7–30)
CALCIUM SERPL-MCNC: 9.2 MG/DL (ref 8.5–10.1)
CHLORIDE SERPL-SCNC: 109 MMOL/L (ref 94–109)
CHOLEST SERPL-MCNC: 201 MG/DL
CO2 SERPL-SCNC: 28 MMOL/L (ref 20–32)
CREAT SERPL-MCNC: 1.05 MG/DL (ref 0.66–1.25)
GFR SERPL CREATININE-BSD FRML MDRD: 66 ML/MIN/{1.73_M2}
GLUCOSE SERPL-MCNC: 91 MG/DL (ref 70–99)
HDLC SERPL-MCNC: 48 MG/DL
LDLC SERPL CALC-MCNC: 122 MG/DL
NONHDLC SERPL-MCNC: 153 MG/DL
POTASSIUM SERPL-SCNC: 4.5 MMOL/L (ref 3.4–5.3)
PROT SERPL-MCNC: 6.9 G/DL (ref 6.8–8.8)
SODIUM SERPL-SCNC: 142 MMOL/L (ref 133–144)
TRIGL SERPL-MCNC: 154 MG/DL

## 2019-07-17 LAB
CREAT UR-MCNC: 135 MG/DL
MICROALBUMIN UR-MCNC: 10 MG/L
MICROALBUMIN/CREAT UR: 7.4 MG/G CR (ref 0–17)

## 2019-08-14 DIAGNOSIS — K21.9 GASTROESOPHAGEAL REFLUX DISEASE WITHOUT ESOPHAGITIS: Chronic | ICD-10-CM

## 2019-08-14 NOTE — TELEPHONE ENCOUNTER
"Requested Prescriptions   Pending Prescriptions Disp Refills     omeprazole (PRILOSEC) 20 MG DR capsule  Last Written Prescription Date:  9/18/2018  Last Fill Quantity: 90 capsule,  # refills: 3   Last office visit: 7/15/2019 with prescribing provider:  Kailyn   Future Office Visit:     90 capsule 3     Sig: TAKE ONE CAPSULE BY MOUTH ONCE DAILY EVERY MORNING ONE-HALF HOUR BEFORE A MEAL       PPI Protocol Passed - 8/14/2019 10:50 AM        Passed - Not on Clopidogrel (unless Pantoprazole ordered)        Passed - No diagnosis of osteoporosis on record        Passed - Recent (12 mo) or future (30 days) visit within the authorizing provider's specialty     Patient had office visit in the last 12 months or has a visit in the next 30 days with authorizing provider or within the authorizing provider's specialty.  See \"Patient Info\" tab in inbasket, or \"Choose Columns\" in Meds & Orders section of the refill encounter.              Passed - Medication is active on med list        Passed - Patient is age 18 or older           "

## 2019-08-21 ENCOUNTER — TRANSFERRED RECORDS (OUTPATIENT)
Dept: HEALTH INFORMATION MANAGEMENT | Facility: CLINIC | Age: 84
End: 2019-08-21

## 2019-08-27 ENCOUNTER — PATIENT OUTREACH (OUTPATIENT)
Dept: GERIATRIC MEDICINE | Facility: CLINIC | Age: 84
End: 2019-08-27

## 2019-08-27 NOTE — PROGRESS NOTES
[facility] Care Coordination Contact    Called member to schedule annual HRA home visit. HRA has been scheduled for 08/29/2019.   Daphne Ochoa RN BSN PHN  [facility]   RN Care Coordinator   Phone:   [phone]  Fax:       [phone]

## 2019-08-29 ENCOUNTER — PATIENT OUTREACH (OUTPATIENT)
Dept: GERIATRIC MEDICINE | Facility: CLINIC | Age: 84
End: 2019-08-29

## 2019-08-29 ASSESSMENT — ACTIVITIES OF DAILY LIVING (ADL): DEPENDENT_IADLS:: CLEANING;COOKING;LAUNDRY;SHOPPING;MEAL PREPARATION;MEDICATION MANAGEMENT;TRANSPORTATION

## 2019-08-29 NOTE — PROGRESS NOTES
[facility] Care Coordination Contact    [facility] Home Visit Assessment     Home visit for Health Risk Assessment with Asha Sanford completed on  August 29, 2019    Type of residence:: Apartment  Current living arrangement:: I live alone     Assessment completed with:: Patient    Current Care Plan  Member currently receiving the following home care services:   N/A  Member currently receiving the following community resources: Day Care, PCA, Other (see comment)(Homemaking)      Medication Review  Medication reconciliation completed in Epic: Yes  Medication set-up & administration: Family/informal caregiver sets up daily.  Family caregiver administers medications.  Medication Risk Assessment Medication (1 or more, place referral to MTM): Taking 1 or more high-risk medications for adults >65 years  MTM Referral Placed: No: Member declined at this time.    Mental/Behavioral Health   Depression Screening: See PHQ assessment flowsheet.   Mental health DX:: Yes(Insomnia)      Mental Health Diagnosis: Yes: Insomnia  Mental Health Services: None: No further intervention needed at this time.    Falls Assessment:   Fallen 2 or more times in the past year?: No   Any fall with injury in the past year?: No    ADL/IADL Dependencies:   Dependent ADLs:: Ambulation-walker, Bathing, Dressing, Grooming, Transfers  Dependent IADLs:: Cleaning, Cooking, Laundry, Shopping, Meal Preparation, Medication Management, Transportation    [facility] sponsored benefits: Shared information re: Silver Sneakers/gym memberships, ASA, Calcium +D.    PCA Assessment completed at visit: Yes     Elderly Waiver Eligibility: Yes-will continue on EW    Care Plan & Recommendations: Member is a 84 yrs old male who lives alone in a one bedroom apartment.  Current medical diagnosis include:   Degeneration of cervical intervertebral disc, cervical spondylosis, Pain in joint involving lower legs, knee pain, Lumbago, abnormal involuntary  movement, essential tremors, DM type 2 and hypertension.   No recent hospitalization and ED visits reported.  Last PCP clinic appointment was on 07/15/2019. Member needs assist with ADLS due to chronic pain, unsteady gait and tremors.  Member  continues to meet access criteria for PCA.  Member to also continue with other POC including ADc and ADC transportation.      See [facility] for detailed assessment information.    Follow-Up Plan: Member informed of future contact, plan to f/u with member with a 6 month telephone assessment.  Contact information shared with member and family, encouraged member to call with any questions or concerns at any time.    [facility] care continuum providers: Please refer to Health Care Home on the Epic Problem List to view this patient's [facility] Care Plan Summary.    Daphne Ochoa RN BSN PHN  [facility]   RN Care Coordinator   Phone:   [phone]  Fax:       [phone]

## 2019-09-12 ENCOUNTER — PATIENT OUTREACH (OUTPATIENT)
Dept: GERIATRIC MEDICINE | Facility: CLINIC | Age: 84
End: 2019-09-12

## 2019-09-12 NOTE — LETTER
September 12, 2019      ASHA ALEMAN  [address]  [facility] [address]      Dear Asha:    At [facility], we are dedicated to improving your health and well-being. Enclosed is the Comprehensive Care Plan that we developed with you on 8/29/2019. Please review the Care Plan carefully.    As a reminder, some of the things we discussed at your visit include:    Your physical and mental health    Ways to reduce falls    Health care needs you may have    Don t forget to contact your care coordinator if you:    Have been hospitalized or plan to be hospitalized     Have had a fall     Have experienced a change in physical health    Are experiencing emotional problems     If you do not agree with your Care Plan, have questions about it, or have experienced a change in your needs, please call me at [phone]. If you are hearing impaired, please call the [place] [employer] at 471 or [phone] (zsldlf-hz-zvhbne relay service).    Sincerely,    CRISTY Middleton, RN, PHN    E-mail: [email or web address]  Phone: [phone]      [facility] ([facility]) is a health plan that contracts with both Medicare and the [place] Medical Assistance (Medicaid) program to provide benefits of both programs to enrollees. Enrollment in [facility] depends on contract renewal.    [id number])     [id number] (11/18)

## 2019-09-12 NOTE — PROGRESS NOTES
[facility] Care Coordination Contact    Received after visit chart from care coordinator.  Completed following tasks: Mailed copy of care plan to client, Updated services in access and Submitted referrals/auths for ADC and transportation   Chart was returned to CC.   , Provider Signature - No POC Shared:  Member indicates that they do not want their POC shared with any EW providers.    [facility]:  Emailed completed PCA assessment to [facility].  Faxed copy of PCA assessment to PCA Agency and mailed copy to member.  Faxed MD Communication to PCP.     Jennifer Ennis  Care Management Specialist  [facility]  [phone]

## 2019-09-30 ENCOUNTER — TELEPHONE (OUTPATIENT)
Dept: FAMILY MEDICINE | Facility: CLINIC | Age: 84
End: 2019-09-30

## 2019-09-30 ENCOUNTER — OFFICE VISIT (OUTPATIENT)
Dept: FAMILY MEDICINE | Facility: CLINIC | Age: 84
End: 2019-09-30
Payer: COMMERCIAL

## 2019-09-30 ENCOUNTER — ANCILLARY PROCEDURE (OUTPATIENT)
Dept: GENERAL RADIOLOGY | Facility: CLINIC | Age: 84
End: 2019-09-30
Attending: FAMILY MEDICINE
Payer: COMMERCIAL

## 2019-09-30 VITALS
BODY MASS INDEX: 27.46 KG/M2 | WEIGHT: 160 LBS | OXYGEN SATURATION: 98 % | HEART RATE: 72 BPM | TEMPERATURE: 97.2 F | DIASTOLIC BLOOD PRESSURE: 71 MMHG | RESPIRATION RATE: 16 BRPM | SYSTOLIC BLOOD PRESSURE: 140 MMHG

## 2019-09-30 DIAGNOSIS — R07.89 ATYPICAL CHEST PAIN: ICD-10-CM

## 2019-09-30 DIAGNOSIS — R07.89 ATYPICAL CHEST PAIN: Primary | ICD-10-CM

## 2019-09-30 DIAGNOSIS — J40 WHEEZY BRONCHITIS: ICD-10-CM

## 2019-09-30 PROCEDURE — 93000 ELECTROCARDIOGRAM COMPLETE: CPT | Performed by: FAMILY MEDICINE

## 2019-09-30 PROCEDURE — 99214 OFFICE O/P EST MOD 30 MIN: CPT | Mod: 25 | Performed by: FAMILY MEDICINE

## 2019-09-30 PROCEDURE — 71046 X-RAY EXAM CHEST 2 VIEWS: CPT | Mod: FY

## 2019-09-30 RX ORDER — GUAIFENESIN/DEXTROMETHORPHAN 100-10MG/5
5 SYRUP ORAL EVERY 4 HOURS PRN
Qty: 240 ML | Refills: 1 | Status: SHIPPED | OUTPATIENT
Start: 2019-09-30 | End: 2019-10-08

## 2019-09-30 RX ORDER — ALBUTEROL SULFATE 90 UG/1
2 AEROSOL, METERED RESPIRATORY (INHALATION) EVERY 6 HOURS
Qty: 18 G | Refills: 0 | Status: SHIPPED | OUTPATIENT
Start: 2019-09-30 | End: 2019-10-08

## 2019-09-30 RX ORDER — AZITHROMYCIN 500 MG/1
500 TABLET, FILM COATED ORAL DAILY
Qty: 3 TABLET | Refills: 0 | Status: SHIPPED | OUTPATIENT
Start: 2019-09-30 | End: 2019-10-08

## 2019-09-30 NOTE — TELEPHONE ENCOUNTER
Patient walked in and said he has a cough and chest pain. He said he had a fever. But today fever free.     Vitals: Temp  97.2 98% O2 Pulse 71  140/71    No history of lung problems.  I ? [place] crackles on upper right lobe. Sob when resting and with exercise.  Coughing at night.    appt with Dr Moser today    Marlena Crane RN- Triage [employer]

## 2019-09-30 NOTE — PROGRESS NOTES
Subjective     Asha Sanford is a 84 year old male who presents to clinic today for the following health issues:    HPI   RESPIRATORY SYMPTOMS      Duration: 15 days    Description  cough, wheezing and chills    Severity: moderate    Accompanying signs and symptoms: headache    History (predisposing factors):  none    Precipitating or alleviating factors: None    Therapies tried and outcome:  None     Presents with a 15-day history of cough congestion and wheezing    Objective findings    Mild nasal congestion no significant polyps or bleeding both nares are open    Both eardrums are normal patient's hearing is mildly decreased    Proximal minimally injected some posterior nasal drainage  No anterior posterior cervical adenopathy  Lungs are otherwise clear to percussion auscultation and palpation  No significant prolongation of expiratory phase  Abdomen is soft and nontender liver and spleen are unenlarged nontender  No abdominal masses no tenderness in bladder area as well  No significant peripheral edema noted  Assessment upper respiratory infection complicated by chest wall pain  Plan  Chest x-ray WITHIN NORMAL LIMITS   And electric cardiogram TECHNICAL ARTIFACT AND NONSPECIFIC CHANGES       Although is not apparently wheezing at this moment in time    Personal history    Recurrent urinary tract infection    Type 2 diabetes mellitus which is well controlled pretty minimal    Episodic tension headaches has not had any recently    Borderline obesity    Moderate intermittent intermittent memory loss    His personal history of lung nodule on imaging    Chronic intermittent low back pain    Episodic insomnia    Hyponatremia    Intermittent hypertension    And hyperlipidemia which is been well controlled            There are no preventive care reminders to display for this patient.          .  Current Outpatient Medications   Medication Sig Dispense Refill     ACE/ARB NOT PRESCRIBED, INTENTIONAL, ACE & ARB not  prescribed due to Intolerance and Other: hypokalemia x 2 episodes       acetaminophen (MAPAP) 500 MG tablet TAKE 1 TABLET BY MOUTH EVERY 6 HOURS AS NEEDED 100 tablet 5     albuterol (PROAIR HFA/PROVENTIL HFA/VENTOLIN HFA) 108 (90 Base) MCG/ACT inhaler Inhale 2 puffs into the lungs every 6 hours as needed for shortness of breath / dyspnea or wheezing 1 Inhaler 0     aspirin 81 MG tablet Take 1 tablet (81 mg) by mouth daily Correct ASA 30 tablet 11     aspirin-acetaminophen-caffeine (EXCEDRIN MIGRAINE) 250-250-65 MG per tablet Take 1 tablet by mouth every 6 hours as needed for headaches 80 tablet 1     atorvastatin (LIPITOR) 20 MG tablet Take 1 tablet (20 mg) by mouth daily 90 tablet 3     blood glucose (NO BRAND SPECIFIED) lancets standard Use to test blood sugar  dialy times daily or as directed. 1 Box 0     blood glucose calibration (NO BRAND SPECIFIED) solution Use to calibrate blood glucose monitor as directed. 1 each 11     blood glucose monitoring (NO BRAND SPECIFIED) meter device kit Use to test blood sugar  Twice daily  times daily or as directed. 1 kit 0     blood glucose monitoring (NO BRAND SPECIFIED) test strip Use to test blood sugars  Daily  times daily or as directed 100 strip 0     carbidopa-levodopa (SINEMET)  MG tablet TAKE 1/2 TABLET BY MOUTH THREE TIMES DAILY 135 tablet 1     finasteride (PROSCAR) 5 MG tablet Take 1 tablet (5 mg) by mouth daily 90 tablet 11     gabapentin (NEURONTIN) 100 MG capsule Take 1 capsule (100 mg) by mouth 2 times daily 180 capsule 11     lidocaine (XYLOCAINE) 5 % ointment One application 4 x daily to affected areas lower back and knees if necessary 142 g 11     metoprolol succinate ER (TOPROL-XL) 50 MG 24 hr tablet Take 1 tablet (50 mg) by mouth daily 90 tablet 2     mupirocin (BACTROBAN) 2 % nasal ointment Spray 1 g into both nostrils 2 times daily 10 g 3     neomycin-polymyxin-hydrocortisone (CORTISPORIN) 3.5-11073-9 otic suspension Place 4 drops into both ears 4  times daily 10 mL 3     omeprazole (PRILOSEC) 20 MG DR capsule TAKE ONE CAPSULE BY MOUTH ONCE DAILY EVERY MORNING ONE-HALF HOUR BEFORE A MEAL 90 capsule 3     polyethylene glycol 0.4%- propylene glycol 0.3% (SYSTANE) 0.4-0.3 % SOLN ophthalmic solution Place 1 drop into both eyes 4 times daily as needed for dry eyes 1 Bottle 11     polyvinyl alcohol (ARTIFICIAL TEARS) 1.4 % ophthalmic solution Place 1 drop into both eyes as needed for dry eyes 15 mL 11     tamsulosin (FLOMAX) 0.4 MG capsule TAKE 1 CAPSULE BY MOUTH EVERY DAY 90 capsule 11     triamcinolone (KENALOG) 0.1 % cream Apply sparingly to affected area three times daily as needed 80 g 0     vitamin D2 (ERGOCALCIFEROL) 30317 units (1250 mcg) capsule TAKE 1 CAPSULE BY MOUTH EVERY WEEK 12 capsule 11              No Known Allergies      Immunization History   Administered Date(s) Administered     Influenza (High Dose) 3 valent vaccine 10/18/2013, 09/15/2015, 10/18/2016     Influenza (IIV3) PF 2007, 10/21/2008, 10/11/2014     Influenza Vaccine IM > 6 months Valent IIV4 2018     Pneumo Conj 13-V (2010&after) 2017     Pneumococcal 23 valent 2007, 10/11/2014     Zoster vaccine, live 10/11/2014               reports no history of alcohol use.          reports no history of drug use.        family history includes Unknown/Adopted in his father and mother.        He indicated that his mother is . He indicated that his father is .             has a past surgical history that includes SURGICAL PATHOLOGY (2010); Eye surgery (); and shoulder surgery.         reports never being sexually active.    .  Pediatric History   Patient Guardians     Not on file     Patient does not qualify to have social determinant information on file (likely too young).   Other Topics Concern     Parent/sibling w/ CABG, MI or angioplasty before 65F 55M? No     Comment: unknown   Social History Narrative     Not on file               reports that he  has never smoked. He has never used smokeless tobacco.        Medical, social, surgical, and family histories reviewed.        Labs reviewed in EPIC  Patient Active Problem List   Diagnosis     Health Care Home     Dysuria     Essential and other specified forms of tremor     Abnormal involuntary movement     Lipoma     Cervical spondylosis without myelopathy     Degeneration of cervical intervertebral disc     Vitamin D deficiency     Memory loss     Obesity     Insomnia     Gastroesophageal reflux disease without esophagitis     Hyperlipidemia LDL goal <100     Pain in joint involving lower leg     Hypertension goal BP (blood pressure) < 140/80     Type 2 diabetes mellitus without complications (H)     Lumbago     Chronic pain of both knees     Headache     Nausea     UTI (urinary tract infection)     Lung nodule seen on imaging study     Bladder stones     ACP (advance care planning)     Hyponatremia     Tension headache     Encounter for diabetic foot exam (H)     Calcified granuloma of lung (H)       Past Surgical History:   Procedure Laterality Date     CL AFF SURGICAL PATHOLOGY  7/1/2010     EYE SURGERY  2003    retinal detatch     SHOULDER SURGERY           Social History     Tobacco Use     Smoking status: Never Smoker     Smokeless tobacco: Never Used   Substance Use Topics     Alcohol use: No       Family History   Problem Relation Age of Onset     Unknown/Adopted Mother      Unknown/Adopted Father              Current Outpatient Medications   Medication Sig Dispense Refill     ACE/ARB NOT PRESCRIBED, INTENTIONAL, ACE & ARB not prescribed due to Intolerance and Other: hypokalemia x 2 episodes       acetaminophen (MAPAP) 500 MG tablet TAKE 1 TABLET BY MOUTH EVERY 6 HOURS AS NEEDED 100 tablet 5     albuterol (PROAIR HFA/PROVENTIL HFA/VENTOLIN HFA) 108 (90 Base) MCG/ACT inhaler Inhale 2 puffs into the lungs every 6 hours as needed for shortness of breath / dyspnea or wheezing 1 Inhaler 0     aspirin 81 MG  tablet Take 1 tablet (81 mg) by mouth daily Correct ASA 30 tablet 11     aspirin-acetaminophen-caffeine (EXCEDRIN MIGRAINE) 250-250-65 MG per tablet Take 1 tablet by mouth every 6 hours as needed for headaches 80 tablet 1     atorvastatin (LIPITOR) 20 MG tablet Take 1 tablet (20 mg) by mouth daily 90 tablet 3     blood glucose (NO BRAND SPECIFIED) lancets standard Use to test blood sugar  dialy times daily or as directed. 1 Box 0     blood glucose calibration (NO BRAND SPECIFIED) solution Use to calibrate blood glucose monitor as directed. 1 each 11     blood glucose monitoring (NO BRAND SPECIFIED) meter device kit Use to test blood sugar  Twice daily  times daily or as directed. 1 kit 0     blood glucose monitoring (NO BRAND SPECIFIED) test strip Use to test blood sugars  Daily  times daily or as directed 100 strip 0     carbidopa-levodopa (SINEMET)  MG tablet TAKE 1/2 TABLET BY MOUTH THREE TIMES DAILY 135 tablet 1     finasteride (PROSCAR) 5 MG tablet Take 1 tablet (5 mg) by mouth daily 90 tablet 11     gabapentin (NEURONTIN) 100 MG capsule Take 1 capsule (100 mg) by mouth 2 times daily 180 capsule 11     lidocaine (XYLOCAINE) 5 % ointment One application 4 x daily to affected areas lower back and knees if necessary 142 g 11     metoprolol succinate ER (TOPROL-XL) 50 MG 24 hr tablet Take 1 tablet (50 mg) by mouth daily 90 tablet 2     mupirocin (BACTROBAN) 2 % nasal ointment Spray 1 g into both nostrils 2 times daily 10 g 3     neomycin-polymyxin-hydrocortisone (CORTISPORIN) 3.5-84135-8 otic suspension Place 4 drops into both ears 4 times daily 10 mL 3     omeprazole (PRILOSEC) 20 MG DR capsule TAKE ONE CAPSULE BY MOUTH ONCE DAILY EVERY MORNING ONE-HALF HOUR BEFORE A MEAL 90 capsule 3     polyethylene glycol 0.4%- propylene glycol 0.3% (SYSTANE) 0.4-0.3 % SOLN ophthalmic solution Place 1 drop into both eyes 4 times daily as needed for dry eyes 1 Bottle 11     polyvinyl alcohol (ARTIFICIAL TEARS) 1.4 %  ophthalmic solution Place 1 drop into both eyes as needed for dry eyes 15 mL 11     tamsulosin (FLOMAX) 0.4 MG capsule TAKE 1 CAPSULE BY MOUTH EVERY DAY 90 capsule 11     triamcinolone (KENALOG) 0.1 % cream Apply sparingly to affected area three times daily as needed 80 g 0     vitamin D2 (ERGOCALCIFEROL) 03526 units (1250 mcg) capsule TAKE 1 CAPSULE BY MOUTH EVERY WEEK 12 capsule 11           Recent Labs   Lab Test 07/15/19  1425 09/18/18  1636 06/01/18  1551 07/18/17  1049  05/05/15  0818   A1C 5.3 5.4 5.6 5.4   < >  --    *  --  156* 128*   < >  --    HDL 48  --  45 55   < >  --    TRIG 154*  --  180* 123   < >  --    ALT 21  --  26 22   < > 26   CR 1.05  --  1.02 0.95   < > 0.86   GFRESTIMATED 66  --  70 76   < > 87   GFRESTBLACK 76  --  85 >90   GFR Calc     < > >90   GFR Calc     POTASSIUM 4.5  --  4.3 4.3   < > 3.6   TSH  --   --   --  0.89  --  0.56    < > = values in this interval not displayed.            BP Readings from Last 6 Encounters:   09/30/19 (!) 140/71   07/15/19 116/57   01/29/19 136/66   11/29/18 138/70   09/18/18 124/70   06/01/18 126/74           Wt Readings from Last 3 Encounters:   09/30/19 72.6 kg (160 lb)   07/15/19 72.6 kg (160 lb)   01/29/19 73.5 kg (162 lb)                 Positive symptoms or findings indicated by bold designation:         ROS: 10 point ROS neg other than the symptoms noted above in the HPI.except  has Health Care Home; Dysuria; Essential and other specified forms of tremor; Abnormal involuntary movement; Lipoma; Cervical spondylosis without myelopathy; Degeneration of cervical intervertebral disc; Vitamin D deficiency; Memory loss; Obesity; Insomnia; Gastroesophageal reflux disease without esophagitis; Hyperlipidemia LDL goal <100; Pain in joint involving lower leg; Hypertension goal BP (blood pressure) < 140/80; Type 2 diabetes mellitus without complications (H); Lumbago; Chronic pain of both knees; Headache; Nausea; UTI  (urinary tract infection); Lung nodule seen on imaging study; Bladder stones; ACP (advance care planning); Hyponatremia; Tension headache; Encounter for diabetic foot exam (H); and Calcified granuloma of lung (H) on their problem list.  Review Of Systems    Skin: negative    Eyes: negative    Ears/Nose/Throat: negative    Respiratory: Coughing and chest wall pain        Cardiovascular: chest pain    Gastrointestinal: negative    Genitourinary: negative    Musculoskeletal: negative    Neurologic: negative    Psychiatric: negative    Hematologic/Lymphatic/Immunologic: negative    Endocrine: Borderline diabetes    Mildly overweight                PE:  BP (!) 140/71 (Cuff Size: Adult Regular)   Pulse 72   Temp 97.2  F (36.2  C) (Tympanic)   Resp 16   Wt 72.6 kg (160 lb)   SpO2 98%   BMI 27.46 kg/m   Body mass index is 27.46 kg/m .        Constitutional: general appearance, well nourished, well developed, in no acute distress, well developed, appears stated age, normal body habitus,          Eyes:; The patient has normal eyelids sclerae and conjunctivae :           Ears/Nose/Throat: external ear, overall: normal appearance; external nose, overall: benign appearance, normal moujth gums and lips           Neck: thyroid, overall: normal size, normal consistency, nontender,          Respiratory:  palpation of chest, overall: normal excursion,    Clear to percussion and auscultation     NO Tachypnea    NORMAL  Color          Cardiovascular:  Good color with no peripheral edema    Regular sinus rhythm without murmur.  Physiologic heart sounds   Heart is unelarged    .     Chest/Breast: normal shape           Abdominal exam,  Liver and spleen are  unenlarged        Tenderness     Scars              Urogenital; no renal, flank or bladder  tenderness;          Lymphatic: neck nodes,     Other nodes         Musculoskeletal:  Brief ortho exam normal except:           Integument: inspection of skin, no rash, lesions; and,  palpation, no induration, no tenderness.          Neurologic mental status, overall: alert and oriented; gait, no ataxia, no unsteadiness; coordination, no tremors; cranial nerves, overall: normal motor, overall: normal bulk, tone.          Psychiatric: orientation/consciousness, overall: oriented to person, place and time; behavior/psychomotor activity, no tics, normal psychomotor activity; mood and affect, overall: normal mood and affect; appearance, overall: well-groomed, good eye contact; speech, overall: normal quality, no aphasia and normal quality, quantity, intact.        Diagnostic Test Results:  Results for orders placed or performed in visit on 08/21/19   Eye Exam - HIM Scan    [facility]    [facility] EYE EXAM NOTE.           ICD-10-CM    1. Atypical chest pain R07.89 EKG 12-lead complete w/read - Clinics     XR Chest 2 Views              .    Side effects benefits and risks thoroughly discussed. .he may come in early if unimproved or getting worse          Please drink 2 glasses of water prior to meals and walk 15-30 minutes after meals        I spent  25 MINUTES SPENT  with patient discussing the following issues   The encounter diagnosis was Atypical chest pain. over half of which involved counseling and coordination of care.        There are no Patient Instructions on file for this visit.        ALL THE ABOVE PROBLEMS ARE STABLE AND MED CHANGES AS NOTED        Diet:          Exercise: AS TOLERATED    Exercises Range of motion, balance, isometric, and strengthening exercises 30 repetitions twice daily of involved joints            .AMY BURNS MD 9/30/2019 2:33 PM  September 30, 2019

## 2019-09-30 NOTE — PATIENT INSTRUCTIONS
01/01/1962  DTAP/TDAP/TD IMMUNIZATION (1 - Tdap)     01/01/2002  MEDICARE ANNUAL WELLNESS VISIT     12/06/2014  ZOSTER IMMUNIZATION (2 of 3)     07/18/2019  TSH W/FREE T4 REFLEX     09/01/2019  INFLUENZA VACCINE (1)      (R07.89) Atypical chest pain  (primary encounter diagnosis)  Comment:    Plan: EKG 12-lead complete w/read - Clinics, XR Chest        2 Views               (R07.89) Atypical chest pain  (primary encounter diagnosis)  Comment:    Plan: EKG 12-lead complete w/read - Clinics, XR Chest        2 Views             (J40) Wheezy bronchitis  Comment:    Plan: guaiFENesin-dextromethorphan (ROBITUSSIN DM)         100-10 MG/5ML syrup, albuterol (PROAIR         HFA/PROVENTIL HFA/VENTOLIN HFA) 108 (90 Base)         MCG/ACT inhaler, azithromycin (ZITHROMAX) 500         MG tablet

## 2019-10-02 ENCOUNTER — PATIENT OUTREACH (OUTPATIENT)
Dept: GERIATRIC MEDICINE | Facility: CLINIC | Age: 84
End: 2019-10-02

## 2019-10-02 NOTE — PROGRESS NOTES
[facility] Care Coordination Contact  CC received notification of Emergency Room visit.  María Teresa Halimo S RN   Asha Sanford had and ER visit on 10/1 to [facility] due to shakiness and lightheadedness.      CC contacted member and reviewed discharge summary.  Member has a follow-up appointment with PCP: Yes: scheduled on :  Member has had a change in condition: No  Future Appointments   Date Time Provider Department Center   10/8/2019  1:30 PM Edwardo Moser MD [facility]     New referrals placed: No  Home Visit Needed: No  Care plan reviewed and member to resume current POC.  PCP notified of ED visit via EMR.  Daphne Ochoa RN BSN PHN  [facility]   RN Care Coordinator   Phone:   [phone]  Fax:       [phone]

## 2019-10-08 ENCOUNTER — OFFICE VISIT (OUTPATIENT)
Dept: FAMILY MEDICINE | Facility: CLINIC | Age: 84
End: 2019-10-08
Payer: COMMERCIAL

## 2019-10-08 VITALS
SYSTOLIC BLOOD PRESSURE: 124 MMHG | BODY MASS INDEX: 27.46 KG/M2 | DIASTOLIC BLOOD PRESSURE: 70 MMHG | RESPIRATION RATE: 16 BRPM | WEIGHT: 160 LBS | HEART RATE: 61 BPM | TEMPERATURE: 97.9 F | OXYGEN SATURATION: 95 %

## 2019-10-08 DIAGNOSIS — R30.0 DYSURIA: ICD-10-CM

## 2019-10-08 DIAGNOSIS — R39.11 HESITANCY OF MICTURITION: ICD-10-CM

## 2019-10-08 DIAGNOSIS — M25.561 ACUTE PAIN OF RIGHT KNEE: Chronic | ICD-10-CM

## 2019-10-08 DIAGNOSIS — E78.5 HYPERLIPIDEMIA LDL GOAL <100: Chronic | ICD-10-CM

## 2019-10-08 DIAGNOSIS — H04.123 DRY EYES, BILATERAL: ICD-10-CM

## 2019-10-08 DIAGNOSIS — M54.2 CERVICALGIA: ICD-10-CM

## 2019-10-08 DIAGNOSIS — R25.1 TREMOR: ICD-10-CM

## 2019-10-08 DIAGNOSIS — J40 WHEEZY BRONCHITIS: ICD-10-CM

## 2019-10-08 DIAGNOSIS — M54.40 CHRONIC LOW BACK PAIN WITH SCIATICA, SCIATICA LATERALITY UNSPECIFIED, UNSPECIFIED BACK PAIN LATERALITY: Chronic | ICD-10-CM

## 2019-10-08 DIAGNOSIS — I10 ESSENTIAL HYPERTENSION, BENIGN: ICD-10-CM

## 2019-10-08 DIAGNOSIS — I10 HYPERTENSION GOAL BP (BLOOD PRESSURE) < 140/80: Chronic | ICD-10-CM

## 2019-10-08 DIAGNOSIS — G89.29 CHRONIC LOW BACK PAIN WITH SCIATICA, SCIATICA LATERALITY UNSPECIFIED, UNSPECIFIED BACK PAIN LATERALITY: Chronic | ICD-10-CM

## 2019-10-08 PROCEDURE — 99214 OFFICE O/P EST MOD 30 MIN: CPT | Performed by: FAMILY MEDICINE

## 2019-10-08 RX ORDER — METOPROLOL SUCCINATE 50 MG/1
50 TABLET, EXTENDED RELEASE ORAL DAILY
Qty: 90 TABLET | Refills: 2 | Status: SHIPPED | OUTPATIENT
Start: 2019-10-08 | End: 2021-07-23

## 2019-10-08 RX ORDER — FINASTERIDE 5 MG/1
5 TABLET, FILM COATED ORAL DAILY
Qty: 90 TABLET | Refills: 11 | Status: SHIPPED | OUTPATIENT
Start: 2019-10-08 | End: 2021-06-07

## 2019-10-08 RX ORDER — GABAPENTIN 100 MG/1
100 CAPSULE ORAL 2 TIMES DAILY
Qty: 180 CAPSULE | Refills: 11 | Status: SHIPPED | OUTPATIENT
Start: 2019-10-08 | End: 2021-01-26

## 2019-10-08 RX ORDER — CARBIDOPA AND LEVODOPA 25; 100 MG/1; MG/1
TABLET ORAL
Qty: 135 TABLET | Refills: 1 | Status: SHIPPED | OUTPATIENT
Start: 2019-10-08 | End: 2020-04-06

## 2019-10-08 RX ORDER — ATORVASTATIN CALCIUM 20 MG/1
20 TABLET, FILM COATED ORAL DAILY
Qty: 90 TABLET | Refills: 3 | Status: SHIPPED | OUTPATIENT
Start: 2019-10-08 | End: 2022-03-14

## 2019-10-08 RX ORDER — ACETAMINOPHEN 500 MG
TABLET ORAL
Qty: 100 TABLET | Refills: 5 | Status: SHIPPED | OUTPATIENT
Start: 2019-10-08 | End: 2023-04-24

## 2019-10-08 RX ORDER — ALBUTEROL SULFATE 90 UG/1
2 AEROSOL, METERED RESPIRATORY (INHALATION) EVERY 6 HOURS
Qty: 18 G | Refills: 0 | Status: SHIPPED | OUTPATIENT
Start: 2019-10-08

## 2019-10-08 RX ORDER — TAMSULOSIN HYDROCHLORIDE 0.4 MG/1
CAPSULE ORAL
Qty: 90 CAPSULE | Refills: 11 | Status: SHIPPED | OUTPATIENT
Start: 2019-10-08 | End: 2021-06-07

## 2019-10-08 NOTE — PATIENT INSTRUCTIONS
Having shakes with right hand     Occasional fever     Upper respiratory infection and cough     Recent negative workup at [facility] EMERGENCY ROOM     UPPER RESPIRATORY INFECTION     NO FURTHER WHEEZING    COMPLICATED with COUGH AND WHEEZING     HEADACHES     WEAKNESS AND LIGHTHEADEDNESS     WORKUP NEGATIVE     FOR MYOCARDIAL INFARCTION     SEEMS TO BE IMPROVING SLOWLY  Current Outpatient Medications   Medication     ACE/ARB NOT PRESCRIBED, INTENTIONAL,     acetaminophen (MAPAP) 500 MG tablet     albuterol (PROAIR HFA/PROVENTIL HFA/VENTOLIN HFA) 108 (90 Base) MCG/ACT inhaler     aspirin (ASA) 81 MG tablet     aspirin-acetaminophen-caffeine (EXCEDRIN MIGRAINE) 250-250-65 MG tablet     atorvastatin (LIPITOR) 20 MG tablet     blood glucose (NO BRAND SPECIFIED) lancets standard     blood glucose calibration (NO BRAND SPECIFIED) solution     blood glucose monitoring (NO BRAND SPECIFIED) meter device kit     blood glucose monitoring (NO BRAND SPECIFIED) test strip     carbidopa-levodopa (SINEMET)  MG tablet     finasteride (PROSCAR) 5 MG tablet     gabapentin (NEURONTIN) 100 MG capsule     metoprolol succinate ER (TOPROL-XL) 50 MG 24 hr tablet     omeprazole (PRILOSEC) 20 MG DR capsule     polyethylene glycol-propylene glycol (SYSTANE) 0.4-0.3 % SOLN ophthalmic solution     polyvinyl alcohol (ARTIFICIAL TEARS) 1.4 % ophthalmic solution     tamsulosin (FLOMAX) 0.4 MG capsule     vitamin D2 (ERGOCALCIFEROL) 55568 units (1250 mcg) capsule     No current facility-administered medications for this visit.          (M54.40,  G89.29) Chronic low back pain with sciatica, sciatica laterality unspecified, unspecified back pain laterality    Comment:      Plan: acetaminophen (MAPAP) 500 MG tablet                   (M25.561) Acute pain of right knee    Comment:      Plan: acetaminophen (MAPAP) 500 MG tablet                   (J40) Wheezy bronchitis    Comment:      Plan: albuterol (PROAIR HFA/PROVENTIL  HFA/VENTOLIN           HFA) 108 (90 Base) MCG/ACT inhaler                   (E78.5) Hyperlipidemia LDL goal <100    Comment:      Plan: aspirin (ASA) 81 MG tablet, atorvastatin           (LIPITOR) 20 MG tablet                   (I10) Hypertension goal BP (blood pressure) < 140/80    Comment:      Plan: aspirin (ASA) 81 MG tablet                   (M54.2) Cervicalgia    Comment:      Plan: aspirin-acetaminophen-caffeine (EXCEDRIN           MIGRAINE) 250-250-65 MG tablet, gabapentin           (NEURONTIN) 100 MG capsule                   (R25.1) Tremor    Comment:      Plan: carbidopa-levodopa (SINEMET)  MG tablet                   (R39.11) Hesitancy of micturition    Comment:      Plan: finasteride (PROSCAR) 5 MG tablet                   (I10) Essential hypertension, benign    Comment:      Plan: metoprolol succinate ER (TOPROL-XL) 50 MG 24 hr          tablet                   (H04.123) Dry eyes, bilateral    Comment:      Plan: polyethylene glycol-propylene glycol (SYSTANE)           0.4-0.3 % SOLN ophthalmic solution                   (R30.0) Dysuria    Comment:      Plan: tamsulosin (FLOMAX) 0.4 MG capsule

## 2019-10-08 NOTE — PROGRESS NOTES
Subjective     Asha Sanford is a 84 year old male who presents to clinic today for the following health issues:    HPI   Follow up  Improved breathing   Fever intermittent   Headaches improved   Improved coughing and wheezing   And improved headache       Duration:     Description (location/character/radiation): pt is here to recheck cough.  Pt states that cough is better but he is still having fever on and off    Intensity:  moderate    Accompanying signs and symptoms: none    History (similar episodes/previous evaluation): None    Precipitating or alleviating factors: None    Therapies tried and outcome: None    Objective findings    Heart and lungs are normal today    No frontal or maxillary sinus pain or tenderness both tympanic membranes are normal    All lungs are clear to percussion auscultation and palpation    Assessment    Upper respiratory infection complicated with episodes of dizziness and lightheadedness    No significant chest pains    Plan    Continue with symptomatic program     Asha Sanford will be discharged via Private Car With Responsible     Patient Verbalized understanding of discharge instructions and recommended follow up care as noted on discharge instructions. Written discharge instructions given, denies any further questions. Prescriptions: : None. . Barriers to learning identified and addressed: Language: Professional  present, giving patient teaching and discharge instruction allowing for questions and explanation.    Pain Level:  Acute Pain Intensity (0-10): 4  Functional Pain Scale (0-5): 1        Electronically signed by Loyda Crook RN at 10/01/2019 10:23 PM CDT    Back to top of Miscellaneous Notes  ED Provider Note - Farheen Brown MD - 10/01/2019 6:31 PM CDT  Formatting of this note might be different from the original.  Emergency Department Staff Physician Note    I had a face to face encounter with this patient seen by the Advanced  "Practice Provider (FRANSISCO). I have seen, examined, and discussed the patient with the FRANSISCO and agree with their assessment and plan of management.    I, Gisele Weber, am serving as a scribe to document services personally performed by Farheen Brown MD, based on my observations and the provider's statements to me.     Relevant HPI:   Asha Sanford is a 84 y.o. male who presents to the Emergency Department for evaluation of weakness    The patient was brought in by EMS. They state he reported chest pain but in the ED (with interpretor) the patient denies having, or ever having, chest pain today. He states he woke up at 4 pm feeling shaky, weak, and lightheaded. He reports his symptoms are similar to when he has low blood sugar but he ate and the symptoms persisted. He denies shortness of breath, lightheadedness, fever, abdominal pain, back pain, urinary symptoms, and trouble with passing stool. He feels better now, like his baseline.     I, Farheen Brown MD, attest that Gisele Weber was acting in a scribe capacity, has observed my performance of the services and has documented them in accordance with my direction.    Exam:  /87  Pulse 86  Temp 98.4  F (36.9  C)  Resp 18  Ht 1.651 m (5' 5\")  Wt 68 kg (150 lb)  SpO2 98%  BMI 24.96 kg/m      Physical Exam   Constitutional: He is oriented to person, place, and time and well-developed, well-nourished, and in no distress. No distress.   HENT:   Head: Normocephalic and atraumatic.   Eyes: Conjunctivae and EOM are normal.   Neck: Normal range of motion.   Cardiovascular:   No cyanosis   Pulmonary/Chest: Effort normal.   Abdominal: He exhibits no distension.   Musculoskeletal: Normal range of motion. He exhibits no deformity.   Neurological: He is alert and oriented to person, place, and time. GCS score is 15.   Skin: No rash noted. He is not diaphoretic.   Psychiatric: Affect normal.   Nursing note and vitals reviewed.    ECG (reviewed and " "interpreted):  Performed at 5:26 PM  Rate: 88 bpm  Sinus rhythm with sinus arrhythmia with 1st degree AV block  Left axis deviation   Minimal voltage criteria for LVH, may be normal variant  Abnormal ECG  No acute ischemia  1st degree AV block  I reviewed the patient's EKG, with comments made as listed above.  Please see scanned ECG for full report.     ED Course:  6:32 PM Discussed the case with Jian Stanley PA-C.  7:38 PM Met with patient for brief interview and exam.     Impression / ED Plan:  Asha Sanford is a 84 y.o. male that presented with shakiness and lightheadedness however he was asymptomatic during my evaluation and remaining ED course. He states this is very similar to other episodes of \"low blood sugar\" but this episode lasted longer. He denies ever having any chest pain as reported by EMS. Vital signs reviewed and grossly unremarkable. Differential diagnoses considered include but are not limited to hypoglycemia, dehydration, MI, PE, posterior circulation stroke. History and physical exam is very reassuring but 2x troponin rule out ordered due to concern for conflicting stories (reported chest pain to EMS via daughter interpreting, but denied to PA and myself via medical interpretor).    Labs reviewed and grossly unremarkable. Imaging personally reviewed and negative for acute cardiopulmonary pathology.     ENCOUNTER DIAGNOSES   ICD-10-CM   1. Lightheadedness R42   2. Shakiness R25.1    Component Name  10/1/2019 10/1/2019 1/13/2018 1/13/2018 4/20/2017 10/31/2015 10/31/2015 10/31/2015 10/31/2015             0.010 0.020 0.016 <0.010 <0.010   Negative Negative Negative Negative             TROPONIN I   TROPONIN I,QUAL,POC    HEMATOLOGY  Component Name  10/1/2019 1/13/2018 7/20/2017 4/20/2017 1/1/2017 3/15/2016 10/31/2015 3/4/2015     14.1 14.8 13.6 15.3   13.4 (L) 13.3 (L) 13.7   91 90 90 89   88 91 91   6.9 8.5 4.7 6.1   6.0 7.5     4.54 4.89 4.42 4.96   4.34 4.35     41.3 44.2 39.9 44.1   38.2 39.4 "     31.1 30.3 30.8 30.8   30.9 30.6     34.1 33.5 34.1 34.7   35.1 33.8     12.7 13.1 13.1 12.6   12.7 13.0     227 183 219 218   189 203     10.3 10.5 10.1 10.0   11.2 (H) 10.2       54.0 55.8 58.2             30.2 29.4 33.4             13.5 (H) 13.6 (H) 7.2             1.7 0.8 0.7             0.2 0.2 0.3             0.4 0.2 0.2             4.6 2.6 3.5             2.6 1.4 2.0             1.1 (H) 0.6 0.4             0.1 0.0 0.0             0.0 0.0 0.0             0.0 0.0 0.0                   45.0                 15.3         HEMOGLOBIN                  MCV                         WHITE BLOOD COUNT           RED BLOOD COUNT             HEMATOCRIT                  MCH                         MCHC                        RDW                         PLATELET COUNT              MPV                         NEUTROPHILS                 LYMPHOCYTES                 MONOCYTES                   EOSINOPHILS                 BASOPHILS                   IMMATURE GRANULOCYTES(METAS,MYELOS,PROS)   ABSOLUTE NEUTROPHILS        ABSOLUTE LYMPHOCYTES        ABSOLUTE MONOCYTES          ABSOLUTE EOSINOPHILS        ABSOLUTE BASOPHILS          ABSOLUTE IMMATURE GRANULOCYTES(METAS,MYELOS,PROS)   HEMATOCRIT,ISTAT   HEMOGLOBIN,ISTAT    URINALYSIS  Component Name  10/1/2019 1/14/2018 7/20/2017 4/20/2017 1/1/2017 3/15/2016 3/3/2015     Yellow Yellow Yellow Yellow Yellow Yellow Pink (A)   Clear Clear Clear Clear Clear Clear Clear   1.020 1.015 <=1.005 (A) <=1.005 (A) 1.010 1.010 <=1.005 (A)   5.5 6.0 7.5 7.5 6.0 6.5 6.5   Normal Normal Normal Normal Normal Normal Normal   Trace (A) Trace (A) Negative Negative Negative Negative 30 (A)   Negative Negative Negative Negative Negative Negative Negative   Negative 15 (A) Negative Negative Negative Negative Negative   Negative Negative Negative Negative Negative Negative Negative   Negative Moderate (A) Negative Negative Moderate (A) Negative Large (A)   Negative Negative Negative Negative Negative  Negative Negative   Small (A) Trace (A) Moderate (A) Small (A) Negative Negative Trace (A)   0 - 2 11 - 25 (A) 0 - 2 0 - 2 >100 (A)   >100 (A)   3 - 5 0 - 2 3 - 5 3 - 5 0 - 2   3 - 5   Rare Few Few Few Few   None Seen   Few Few Few Few Few   None Seen               Present   0 - 2 0 - 2 0 - 2 0 - 2 0 - 2       COLOR                       CLARITY                     SPECIFIC GRAVITY,URINE      PH,URINE                    UROBILINOGEN,QUALITATIVE    PROTEIN, URINE   GLUCOSE, URINE   KETONES,URINE               BILIRUBIN,URINE             OCCULT BLOOD,URINE          NITRITE                     LEUKOCYTE ESTERASE          RBC   WBC   BACTERIA                    EPITHELIAL CELLS            Mucus   HYALINE CASTS    CHEMISTRY, COMMON  Component Name  10/1/2019 1/14/2018 1/13/2018 7/20/2017 4/20/2017 1/1/2017 3/15/2016 10/31/2015     139 138 135 135 136   130 (L) 137   4.0 4.2 3.7 3.9 4.1   4.0 4.0   102 104 101 100 104   99 102   27 26 25 29 25   26 30   10 8 9 6 7   5 5   125 (H) 107 (H) 136 (H) 114 (H) 104 (H)   129 (H) 109 (H)   9.5 9.1 9.5 9.2 9.7   9.2 9.3   17 14 16 12 15   13 11   0.95 1.01 1.35 (H) 0.92 0.89   0.89 0.89   18 14 12 13 17   15 12   >60 >60 >60 >60 >60 >60 >60 >60   >60 >60 51 (L) >60 >60 >60 >60 >60             0.90                 102 (H)                 13                 137                 3.8                 98                 30                 14                 29         .AMY BURNS MD .10/8/2019 3:12 PM .October 8, 2019              There are no preventive care reminders to display for this patient.          .  Current Outpatient Medications   Medication Sig Dispense Refill     ACE/ARB NOT PRESCRIBED, INTENTIONAL, ACE & ARB not prescribed due to Intolerance and Other: hypokalemia x 2 episodes       acetaminophen (MAPAP) 500 MG tablet TAKE 1 TABLET BY MOUTH EVERY 6 HOURS AS NEEDED 100 tablet 5     albuterol (PROAIR HFA/PROVENTIL HFA/VENTOLIN HFA) 108 (90 Base) MCG/ACT inhaler Inhale 2  puffs into the lungs every 6 hours 18 g 0     aspirin (ASA) 81 MG tablet Take 1 tablet (81 mg) by mouth daily Correct ASA 30 tablet 11     aspirin-acetaminophen-caffeine (EXCEDRIN MIGRAINE) 250-250-65 MG tablet Take 1 tablet by mouth every 6 hours as needed for headaches 80 tablet 1     atorvastatin (LIPITOR) 20 MG tablet Take 1 tablet (20 mg) by mouth daily 90 tablet 3     blood glucose (NO BRAND SPECIFIED) lancets standard Use to test blood sugar  dialy times daily or as directed. 1 Box 0     blood glucose calibration (NO BRAND SPECIFIED) solution Use to calibrate blood glucose monitor as directed. 1 each 11     blood glucose monitoring (NO BRAND SPECIFIED) meter device kit Use to test blood sugar  Twice daily  times daily or as directed. 1 kit 0     blood glucose monitoring (NO BRAND SPECIFIED) test strip Use to test blood sugars  Daily  times daily or as directed 100 strip 0     carbidopa-levodopa (SINEMET)  MG tablet TAKE 1/2 TABLET BY MOUTH THREE TIMES DAILY 135 tablet 1     finasteride (PROSCAR) 5 MG tablet Take 1 tablet (5 mg) by mouth daily 90 tablet 11     gabapentin (NEURONTIN) 100 MG capsule Take 1 capsule (100 mg) by mouth 2 times daily 180 capsule 11     metoprolol succinate ER (TOPROL-XL) 50 MG 24 hr tablet Take 1 tablet (50 mg) by mouth daily 90 tablet 2     omeprazole (PRILOSEC) 20 MG DR capsule TAKE ONE CAPSULE BY MOUTH ONCE DAILY EVERY MORNING ONE-HALF HOUR BEFORE A MEAL 90 capsule 3     polyethylene glycol-propylene glycol (SYSTANE) 0.4-0.3 % SOLN ophthalmic solution Place 1 drop into both eyes 4 times daily as needed for dry eyes 1 Bottle 11     polyvinyl alcohol (ARTIFICIAL TEARS) 1.4 % ophthalmic solution Place 1 drop into both eyes as needed for dry eyes 15 mL 11     tamsulosin (FLOMAX) 0.4 MG capsule TAKE 1 CAPSULE BY MOUTH EVERY DAY 90 capsule 11     vitamin D2 (ERGOCALCIFEROL) 34417 units (1250 mcg) capsule TAKE 1 CAPSULE BY MOUTH EVERY WEEK 12 capsule 11              No Known  Allergies      Immunization History   Administered Date(s) Administered     Influenza (High Dose) 3 valent vaccine 10/18/2013, 09/15/2015, 10/18/2016     Influenza (IIV3) PF 2007, 10/21/2008, 10/11/2014     Influenza Vaccine IM > 6 months Valent IIV4 2018     Pneumo Conj 13-V (2010&after) 2017     Pneumococcal 23 valent 2007, 10/11/2014     Zoster vaccine, live 10/11/2014               reports no history of alcohol use.          reports no history of drug use.        family history includes Unknown/Adopted in his father and mother.        He indicated that his mother is . He indicated that his father is .             has a past surgical history that includes SURGICAL PATHOLOGY (2010); Eye surgery (); and shoulder surgery.         reports never being sexually active.    .  Pediatric History   Patient Guardians     Not on file     Patient does not qualify to have social determinant information on file (likely too young).   Other Topics Concern     Parent/sibling w/ CABG, MI or angioplasty before 65F 55M? No     Comment: unknown   Social History Narrative     Not on file               reports that he has never smoked. He has never used smokeless tobacco.        Medical, social, surgical, and family histories reviewed.        Labs reviewed in EPIC  Patient Active Problem List   Diagnosis     Health Care Home     Dysuria     Essential and other specified forms of tremor     Abnormal involuntary movement     Lipoma     Cervical spondylosis without myelopathy     Degeneration of cervical intervertebral disc     Vitamin D deficiency     Memory loss     Obesity     Insomnia     Gastroesophageal reflux disease without esophagitis     Hyperlipidemia LDL goal <100     Pain in joint involving lower leg     Hypertension goal BP (blood pressure) < 140/80     Type 2 diabetes mellitus without complications (H)     Lumbago     Chronic pain of both knees     Headache     Nausea     UTI  (urinary tract infection)     Lung nodule seen on imaging study     Bladder stones     ACP (advance care planning)     Hyponatremia     Tension headache     Encounter for diabetic foot exam (H)     Calcified granuloma of lung (H)       Past Surgical History:   Procedure Laterality Date     CL AFF SURGICAL PATHOLOGY  7/1/2010     EYE SURGERY  2003    retinal detatch     SHOULDER SURGERY           Social History     Tobacco Use     Smoking status: Never Smoker     Smokeless tobacco: Never Used   Substance Use Topics     Alcohol use: No       Family History   Problem Relation Age of Onset     Unknown/Adopted Mother      Unknown/Adopted Father              Current Outpatient Medications   Medication Sig Dispense Refill     ACE/ARB NOT PRESCRIBED, INTENTIONAL, ACE & ARB not prescribed due to Intolerance and Other: hypokalemia x 2 episodes       acetaminophen (MAPAP) 500 MG tablet TAKE 1 TABLET BY MOUTH EVERY 6 HOURS AS NEEDED 100 tablet 5     albuterol (PROAIR HFA/PROVENTIL HFA/VENTOLIN HFA) 108 (90 Base) MCG/ACT inhaler Inhale 2 puffs into the lungs every 6 hours 18 g 0     aspirin (ASA) 81 MG tablet Take 1 tablet (81 mg) by mouth daily Correct ASA 30 tablet 11     aspirin-acetaminophen-caffeine (EXCEDRIN MIGRAINE) 250-250-65 MG tablet Take 1 tablet by mouth every 6 hours as needed for headaches 80 tablet 1     atorvastatin (LIPITOR) 20 MG tablet Take 1 tablet (20 mg) by mouth daily 90 tablet 3     blood glucose (NO BRAND SPECIFIED) lancets standard Use to test blood sugar  dialy times daily or as directed. 1 Box 0     blood glucose calibration (NO BRAND SPECIFIED) solution Use to calibrate blood glucose monitor as directed. 1 each 11     blood glucose monitoring (NO BRAND SPECIFIED) meter device kit Use to test blood sugar  Twice daily  times daily or as directed. 1 kit 0     blood glucose monitoring (NO BRAND SPECIFIED) test strip Use to test blood sugars  Daily  times daily or as directed 100 strip 0      carbidopa-levodopa (SINEMET)  MG tablet TAKE 1/2 TABLET BY MOUTH THREE TIMES DAILY 135 tablet 1     finasteride (PROSCAR) 5 MG tablet Take 1 tablet (5 mg) by mouth daily 90 tablet 11     gabapentin (NEURONTIN) 100 MG capsule Take 1 capsule (100 mg) by mouth 2 times daily 180 capsule 11     metoprolol succinate ER (TOPROL-XL) 50 MG 24 hr tablet Take 1 tablet (50 mg) by mouth daily 90 tablet 2     omeprazole (PRILOSEC) 20 MG DR capsule TAKE ONE CAPSULE BY MOUTH ONCE DAILY EVERY MORNING ONE-HALF HOUR BEFORE A MEAL 90 capsule 3     polyethylene glycol-propylene glycol (SYSTANE) 0.4-0.3 % SOLN ophthalmic solution Place 1 drop into both eyes 4 times daily as needed for dry eyes 1 Bottle 11     polyvinyl alcohol (ARTIFICIAL TEARS) 1.4 % ophthalmic solution Place 1 drop into both eyes as needed for dry eyes 15 mL 11     tamsulosin (FLOMAX) 0.4 MG capsule TAKE 1 CAPSULE BY MOUTH EVERY DAY 90 capsule 11     vitamin D2 (ERGOCALCIFEROL) 72075 units (1250 mcg) capsule TAKE 1 CAPSULE BY MOUTH EVERY WEEK 12 capsule 11           Recent Labs   Lab Test 07/15/19  1425 09/18/18  1636 06/01/18  1551 07/18/17  1049  05/05/15  0818   A1C 5.3 5.4 5.6 5.4   < >  --    *  --  156* 128*   < >  --    HDL 48  --  45 55   < >  --    TRIG 154*  --  180* 123   < >  --    ALT 21  --  26 22   < > 26   CR 1.05  --  1.02 0.95   < > 0.86   GFRESTIMATED 66  --  70 76   < > 87   GFRESTBLACK 76  --  85 >90   GFR Calc     < > >90   GFR Calc     POTASSIUM 4.5  --  4.3 4.3   < > 3.6   TSH  --   --   --  0.89  --  0.56    < > = values in this interval not displayed.            BP Readings from Last 6 Encounters:   10/08/19 124/70   09/30/19 (!) 140/71   07/15/19 116/57   01/29/19 136/66   11/29/18 138/70   09/18/18 124/70           Wt Readings from Last 3 Encounters:   10/08/19 72.6 kg (160 lb)   09/30/19 72.6 kg (160 lb)   07/15/19 72.6 kg (160 lb)                 Positive symptoms or findings indicated by bold  designation:         ROS: 10 point ROS neg other than the symptoms noted above in the HPI.except  has Health Care Home; Dysuria; Essential and other specified forms of tremor; Abnormal involuntary movement; Lipoma; Cervical spondylosis without myelopathy; Degeneration of cervical intervertebral disc; Vitamin D deficiency; Memory loss; Obesity; Insomnia; Gastroesophageal reflux disease without esophagitis; Hyperlipidemia LDL goal <100; Pain in joint involving lower leg; Hypertension goal BP (blood pressure) < 140/80; Type 2 diabetes mellitus without complications (H); Lumbago; Chronic pain of both knees; Headache; Nausea; UTI (urinary tract infection); Lung nodule seen on imaging study; Bladder stones; ACP (advance care planning); Hyponatremia; Tension headache; Encounter for diabetic foot exam (H); and Calcified granuloma of lung (H) on their problem list.  Review Of Systems    Skin: negative    Eyes: negative    Ears/Nose/Throat: nasal congestion, sneezing,       Respiratory: Recent coughing and wheezing episodes recent coughing and wheezing episode improved    Cardiovascular: negative    Gastrointestinal: negative    Genitourinary: negative    Musculoskeletal: negative    Neurologic: negative    Psychiatric: negative    Hematologic/Lymphatic/Immunologic: negative    Endocrine: negative and diabetes                PE:  /70   Pulse 61   Temp 97.9  F (36.6  C)   Resp 16   Wt 72.6 kg (160 lb)   SpO2 95%   BMI 27.46 kg/m   Body mass index is 27.46 kg/m .        Constitutional: general appearance, well nourished, well developed, in no acute distress, well developed, appears stated age, normal body habitus, mildly overweight        Eyes:; The patient has normal eyelids sclerae and conjunctivae :          Ears/Nose/Throat: external ear, overall: normal appearance; external nose, overall: benign appearance, normal moujth gums and lips       Mild pressure sensation frontal and ocular sinuses        Neck:  thyroid, overall: normal size, normal consistency, nontender,           Respiratory:  palpation of chest, overall: normal excursion,    Clear to percussion and auscultation     NO Tachypnea    NORMAL  Color          Cardiovascular:  Good color with no peripheral edema     Regular sinus rhythm without murmur.  Physiologic heart sounds   Heart is unelarged    .     Chest/Breast: normal shape           Abdominal exam,  Liver and spleen are  unenlarged         Tenderness    Scars              Urogenital; no renal, flank or bladder  tenderness;          Lymphatic: neck nodes,     Other nodes          Musculoskeletal:  Brief ortho exam normal except: Decreased range of motion of the back negative straight leg raise test decreased range of motion of the back \    osteoarthritis both knees         Integument: inspection of skin, no rash, lesions; and, palpation, no induration, no tenderness.          Neurologic mental status, overall: alert and oriented; gait, no ataxia, no unsteadiness; coordination, no tremors; cranial nerves, overall: normal motor, overall: normal bulk, tone.          Psychiatric: orientation/consciousness, overall: oriented to person, place and time; behavior/psychomotor activity, no tics, normal psychomotor activity; mood and affect, overall: normal mood and affect; appearance, overall: well-groomed, good eye contact; speech, overall: normal quality, no aphasia and normal quality, quantity, intact.        Diagnostic Test Results:  Results for orders placed or performed in visit on 09/30/19   XR Chest 2 Views    Narrative    XR CHEST TWO VIEWS   9/30/2019 2:40 PM     HISTORY: Atypical chest pain.    COMPARISON: 11/29/2018.      Impression    IMPRESSION: No acute cardiopulmonary disease.    SHWETA CARTY MD           ICD-10-CM    1. Chronic low back pain with sciatica, sciatica laterality unspecified, unspecified back pain laterality M54.40 acetaminophen (MAPAP) 500 MG tablet    G89.29    2. Acute pain of  right knee M25.561 acetaminophen (MAPAP) 500 MG tablet   3. Wheezy bronchitis J40 albuterol (PROAIR HFA/PROVENTIL HFA/VENTOLIN HFA) 108 (90 Base) MCG/ACT inhaler   4. Hyperlipidemia LDL goal <100 E78.5 aspirin (ASA) 81 MG tablet     atorvastatin (LIPITOR) 20 MG tablet   5. Hypertension goal BP (blood pressure) < 140/80 I10 aspirin (ASA) 81 MG tablet   6. Cervicalgia M54.2 aspirin-acetaminophen-caffeine (EXCEDRIN MIGRAINE) 250-250-65 MG tablet     gabapentin (NEURONTIN) 100 MG capsule   7. Tremor R25.1 carbidopa-levodopa (SINEMET)  MG tablet   8. Hesitancy of micturition R39.11 finasteride (PROSCAR) 5 MG tablet   9. Essential hypertension, benign I10 metoprolol succinate ER (TOPROL-XL) 50 MG 24 hr tablet   10. Dry eyes, bilateral H04.123 polyethylene glycol-propylene glycol (SYSTANE) 0.4-0.3 % SOLN ophthalmic solution   11. Dysuria R30.0 tamsulosin (FLOMAX) 0.4 MG capsule              .    Side effects benefits and risks thoroughly discussed. .he may come in early if unimproved or getting worse          Please drink 2 glasses of water prior to meals and walk 15-30 minutes after meals        I spent   with patient discussing the following issues   Diagnoses of Chronic low back pain with sciatica, sciatica laterality unspecified, unspecified back pain laterality, Acute pain of right knee, Wheezy bronchitis, Hyperlipidemia LDL goal <100, Hypertension goal BP (blood pressure) < 140/80, Cervicalgia, Tremor, Hesitancy of micturition, Essential hypertension, benign, Dry eyes, bilateral, and Dysuria were pertinent to this visit. over half of which involved counseling and coordination of care.      Patient Instructions       Having shakes with right hand     Occasional fever     Upper respiratory infection and cough     Recent negative workup at [facility] EMERGENCY ROOM     UPPER RESPIRATORY INFECTION     NO FURTHER WHEEZING    COMPLICATED with COUGH AND WHEEZING     HEADACHES     WEAKNESS AND  LIGHTHEADEDNESS     WORKUP NEGATIVE     FOR MYOCARDIAL INFARCTION     SEEMS TO BE IMPROVING SLOWLY  Current Outpatient Medications   Medication     ACE/ARB NOT PRESCRIBED, INTENTIONAL,     acetaminophen (MAPAP) 500 MG tablet     albuterol (PROAIR HFA/PROVENTIL HFA/VENTOLIN HFA) 108 (90 Base) MCG/ACT inhaler     aspirin (ASA) 81 MG tablet     aspirin-acetaminophen-caffeine (EXCEDRIN MIGRAINE) 250-250-65 MG tablet     atorvastatin (LIPITOR) 20 MG tablet     blood glucose (NO BRAND SPECIFIED) lancets standard     blood glucose calibration (NO BRAND SPECIFIED) solution     blood glucose monitoring (NO BRAND SPECIFIED) meter device kit     blood glucose monitoring (NO BRAND SPECIFIED) test strip     carbidopa-levodopa (SINEMET)  MG tablet     finasteride (PROSCAR) 5 MG tablet     gabapentin (NEURONTIN) 100 MG capsule     metoprolol succinate ER (TOPROL-XL) 50 MG 24 hr tablet     omeprazole (PRILOSEC) 20 MG DR capsule     polyethylene glycol-propylene glycol (SYSTANE) 0.4-0.3 % SOLN ophthalmic solution     polyvinyl alcohol (ARTIFICIAL TEARS) 1.4 % ophthalmic solution     tamsulosin (FLOMAX) 0.4 MG capsule     vitamin D2 (ERGOCALCIFEROL) 73919 units (1250 mcg) capsule     No current facility-administered medications for this visit.          (M54.40,  G89.29) Chronic low back pain with sciatica, sciatica laterality unspecified, unspecified back pain laterality    Comment:      Plan: acetaminophen (MAPAP) 500 MG tablet                   (M25.561) Acute pain of right knee    Comment:      Plan: acetaminophen (MAPAP) 500 MG tablet                   (J40) Wheezy bronchitis    Comment:      Plan: albuterol (PROAIR HFA/PROVENTIL HFA/VENTOLIN           HFA) 108 (90 Base) MCG/ACT inhaler                   (E78.5) Hyperlipidemia LDL goal <100    Comment:      Plan: aspirin (ASA) 81 MG tablet, atorvastatin           (LIPITOR) 20 MG tablet                   (I10) Hypertension goal BP (blood pressure) < 140/80    Comment:       Plan: aspirin (ASA) 81 MG tablet                   (M54.2) Cervicalgia    Comment:      Plan: aspirin-acetaminophen-caffeine (EXCEDRIN           MIGRAINE) 250-250-65 MG tablet, gabapentin           (NEURONTIN) 100 MG capsule                   (R25.1) Tremor    Comment:      Plan: carbidopa-levodopa (SINEMET)  MG tablet                   (R39.11) Hesitancy of micturition    Comment:      Plan: finasteride (PROSCAR) 5 MG tablet                   (I10) Essential hypertension, benign    Comment:      Plan: metoprolol succinate ER (TOPROL-XL) 50 MG 24 hr          tablet                   (H04.123) Dry eyes, bilateral    Comment:      Plan: polyethylene glycol-propylene glycol (SYSTANE)           0.4-0.3 % SOLN ophthalmic solution                   (R30.0) Dysuria    Comment:      Plan: tamsulosin (FLOMAX) 0.4 MG capsule                                       ALL THE ABOVE PROBLEMS ARE STABLE AND MED CHANGES AS NOTED        Diet:          Exercise:    Exercises Range of motion, balance, isometric, and strengthening exercises 30 repetitions twice daily of involved joints            .AMY BURNS MD 10/8/2019 3:12 PM  October 8, 2019

## 2019-11-04 DIAGNOSIS — R30.0 DYSURIA: ICD-10-CM

## 2019-11-04 RX ORDER — TAMSULOSIN HYDROCHLORIDE 0.4 MG/1
CAPSULE ORAL
Qty: 90 CAPSULE | Refills: 11 | OUTPATIENT
Start: 2019-11-04

## 2019-11-04 NOTE — TELEPHONE ENCOUNTER
"tamsulosin (FLOMAX) 0.4 MG capsule  Last Written Prescription Date:  10/8/2019  Last Fill Quantity: 90,  # refills: 11   Last office visit: 10/8/2019 with prescribing provider:     Future Office Visit:   Next 5 appointments (look out 90 days)    Jan 21, 2020  2:00 PM CST  [facility] [address] [address] [phone]         Requested Prescriptions   Pending Prescriptions Disp Refills     tamsulosin (FLOMAX) 0.4 MG capsule 90 capsule 11     Sig: TAKE 1 CAPSULE BY MOUTH EVERY DAY       Alpha Blockers Passed - 11/4/2019  8:32 AM        Passed - Blood pressure under 140/90 in past 12 months     BP Readings from Last 3 Encounters:   10/08/19 124/70   09/30/19 (!) 140/71   07/15/19 116/57                 Passed - Recent (12 mo) or future (30 days) visit within the authorizing provider's specialty     Patient has had an office visit with the authorizing provider or a provider within the authorizing providers department within the previous 12 mos or has a future within next 30 days. See \"Patient Info\" tab in inbasket, or \"Choose Columns\" in Meds & Orders section of the refill encounter.              Passed - Patient does not have Tadalafil, Vardenafil, or Sildenafil on their medication list        Passed - Medication is active on med list        Passed - Patient is 18 years of age or older          "

## 2019-11-29 ENCOUNTER — OFFICE VISIT (OUTPATIENT)
Dept: FAMILY MEDICINE | Facility: CLINIC | Age: 84
End: 2019-11-29
Payer: COMMERCIAL

## 2019-11-29 VITALS
BODY MASS INDEX: 27.66 KG/M2 | RESPIRATION RATE: 16 BRPM | DIASTOLIC BLOOD PRESSURE: 72 MMHG | HEART RATE: 60 BPM | HEIGHT: 64 IN | SYSTOLIC BLOOD PRESSURE: 124 MMHG | OXYGEN SATURATION: 97 % | WEIGHT: 162 LBS

## 2019-11-29 DIAGNOSIS — Z00.00 ENCOUNTER FOR MEDICARE ANNUAL WELLNESS EXAM: Primary | ICD-10-CM

## 2019-11-29 DIAGNOSIS — Z23 NEED FOR PROPHYLACTIC VACCINATION AND INOCULATION AGAINST INFLUENZA: ICD-10-CM

## 2019-11-29 DIAGNOSIS — Z02.89 ENCOUNTER FOR COMPLETION OF FORM WITH PATIENT: ICD-10-CM

## 2019-11-29 PROCEDURE — G0008 ADMIN INFLUENZA VIRUS VAC: HCPCS | Performed by: PHYSICIAN ASSISTANT

## 2019-11-29 PROCEDURE — 90662 IIV NO PRSV INCREASED AG IM: CPT | Performed by: PHYSICIAN ASSISTANT

## 2019-11-29 PROCEDURE — G0438 PPPS, INITIAL VISIT: HCPCS | Performed by: PHYSICIAN ASSISTANT

## 2019-11-29 ASSESSMENT — ACTIVITIES OF DAILY LIVING (ADL)
CURRENT_FUNCTION: MEDICATION ADMINISTRATION REQUIRES ASSISTANCE
CURRENT_FUNCTION: MONEY MANAGEMENT REQUIRES ASSISTANCE
CURRENT_FUNCTION: PREPARING MEALS REQUIRES ASSISTANCE
CURRENT_FUNCTION: SHOPPING REQUIRES ASSISTANCE
CURRENT_FUNCTION: LAUNDRY REQUIRES ASSISTANCE
CURRENT_FUNCTION: TRANSPORTATION REQUIRES ASSISTANCE
CURRENT_FUNCTION: HOUSEWORK REQUIRES ASSISTANCE

## 2019-11-29 ASSESSMENT — ENCOUNTER SYMPTOMS: HEADACHES: 1

## 2019-11-29 ASSESSMENT — MIFFLIN-ST. JEOR: SCORE: 1345.83

## 2019-11-29 NOTE — PATIENT INSTRUCTIONS
Patient Education   Preventive Health Recommendations  See your health care provider every year to    Review health changes.     Discuss preventive care.      Review your medicines if your doctor has prescribed any.    Talk with your health care provider about whether you should have a test to screen for prostate cancer (PSA).    Every 3 years, have a diabetes test (fasting glucose). If you are at risk for diabetes, you should have this test more often.    Every 5 years, have a cholesterol test. Have this test more often if you are at risk for high cholesterol or heart disease.     Every 10 years, have a colonoscopy. Or, have a yearly FIT test (stool test). These exams will check for colon cancer.    Talk to with your health care provider about screening for Abdominal Aortic Aneurysm if you have a family history of AAA or have a history of smoking.    Shots:     Get a flu shot each year.     Get a tetanus shot every 10 years.     Talk to your doctor about your pneumonia vaccines. There are now two you should receive - Pneumovax (PPSV 23) and Prevnar (PCV 13).    Talk to your pharmacist about a shingles vaccine.     Talk to your doctor about the hepatitis B vaccine.    Nutrition:     Eat at least 5 servings of fruits and vegetables each day.     Eat whole-grain bread, whole-wheat pasta and brown rice instead of white grains and rice.     Get adequate Calcium and Vitamin D.     Lifestyle    Exercise for at least 150 minutes a week (30 minutes a day, 5 days a week). This will help you control your weight and prevent disease.     Limit alcohol to one drink per day.     No smoking.     Wear sunscreen to prevent skin cancer.     See your dentist every six months for an exam and cleaning.     See your eye doctor every 1 to 2 years to screen for conditions such as glaucoma, macular degeneration and cataracts.    Personalized Prevention Plan  You are due for the preventive services outlined below.  Your care team is  available to assist you in scheduling these services.  If you have already completed any of these items, please share that information with your care team to update in your medical record.  Health Maintenance Due   Topic Date Due     Annual Wellness Visit  01/01/2002     Diptheria Tetanus Pertussis (DTAP/TDAP/TD) Vaccine (2 - Td) 11/20/2008     Zoster (Shingles) Vaccine (2 of 3) 12/06/2014     PHQ-2  01/01/2019     Thyroid Function Lab  07/18/2019     Flu Vaccine (1) 09/01/2019     Diabetic Foot Exam  11/29/2019

## 2019-11-29 NOTE — PROGRESS NOTES
"SUBJECTIVE:   Asha Sanford is a 84 year old male who presents for Preventive Visit.  is present.   Are you in the first 12 months of your Medicare coverage?  No    Healthy Habits:    In general, how would you rate your overall health?  Very good    Frequency of exercise:  4-5 days/week    Duration of exercise:  45-60 minutes    Do you usually eat at least 4 servings of fruit and vegetables a day, include whole grains    & fiber and avoid regularly eating high fat or \"junk\" foods?  Yes    Taking medications regularly:  Yes    Barriers to taking medications:  None    Medication side effects:  None    Ability to successfully perform activities of daily living:  Housework requires assistance, laundry requires assistance, transportation requires assistance, shopping requires assistance, preparing meals requires assistance, medication administration requires assistance and money management requires assistance    Home Safety:  No safety concerns identified    Hearing Impairment:  No hearing concerns    In the past 6 months, have you been bothered by leaking of urine?  No    In general, how would you rate your overall mental or emotional health?  Good      PHQ-2 Total Score:  Headache        Do you feel safe in your environment? Yes    Have you ever done Advance Care Planning? (For example, a Health Directive, POLST, or a discussion with a medical provider or your loved ones about your wishes): No, advance care planning information given to patient to review.  Patient declined advance care planning discussion at this time.      Fall risk  Fallen 2 or more times in the past year?: No  Any fall with injury in the past year?: No  click delete button to remove this line now  Cognitive Screening Not appropriate due to language barrier    Do you have sleep apnea, excessive snoring or daytime drowsiness?: no    Reviewed and updated as needed this visit by clinical staff  Tobacco  Allergies  Meds  Problems  Med " Hx  Surg Hx  Fam Hx  Soc Hx          Reviewed and updated as needed this visit by Provider  Tobacco  Allergies  Meds  Problems  Med Hx  Surg Hx  Fam Hx        Social History     Tobacco Use     Smoking status: Never Smoker     Smokeless tobacco: Never Used   Substance Use Topics     Alcohol use: No     If you drink alcohol do you typically have >3 drinks per day or >7 drinks per week? No    Alcohol Use 11/29/2019   Prescreen: >3 drinks/day or >7 drinks/week? No           Current providers sharing in care for this patient include:   Patient Care Team:  Edwardo Moser MD as PCP - General (Family Practice)  Edwardo Moser MD as Assigned PCP  Kofi Webb MD as MD (Family Practice)  Daphne Ochoa RN as Lead Care Coordinator (Primary Care - CC)    The following health maintenance items are reviewed in Epic and correct as of today:  Health Maintenance   Topic Date Due     MEDICARE ANNUAL WELLNESS VISIT  01/01/2002     DTAP/TDAP/TD IMMUNIZATION (2 - Td) 11/20/2008     ZOSTER IMMUNIZATION (2 of 3) 12/06/2014     TSH W/FREE T4 REFLEX  07/18/2019     DIABETIC FOOT EXAM  11/29/2019     A1C  01/15/2020     BMP  07/15/2020     LIPID  07/15/2020     MICROALBUMIN  07/15/2020     EYE EXAM  08/21/2020     FALL RISK ASSESSMENT  08/29/2020     ADVANCE CARE PLANNING  11/29/2024     PHQ-2  Completed     INFLUENZA VACCINE  Completed     PNEUMOCOCCAL IMMUNIZATION 65+ LOW/MEDIUM RISK  Completed     IPV IMMUNIZATION  Aged Out     MENINGITIS IMMUNIZATION  Aged Out     BP Readings from Last 3 Encounters:   11/29/19 124/72   10/08/19 124/70   09/30/19 (!) 140/71    Wt Readings from Last 3 Encounters:   11/29/19 73.5 kg (162 lb)   10/08/19 72.6 kg (160 lb)   09/30/19 72.6 kg (160 lb)              Review of Systems   Neurological: Positive for headaches.     Constitutional, HEENT, cardiovascular, pulmonary, GI, , musculoskeletal, neuro, skin, endocrine and psych systems are negative, except as otherwise  "noted.    OBJECTIVE:   /72   Pulse 60   Resp 16   Ht 1.626 m (5' 4\")   Wt 73.5 kg (162 lb)   SpO2 97%   BMI 27.81 kg/m   Estimated body mass index is 27.81 kg/m  as calculated from the following:    Height as of this encounter: 1.626 m (5' 4\").    Weight as of this encounter: 73.5 kg (162 lb).  Physical Exam  GENERAL:  WDWN, no acute distress  PSYCH: pleasant, cooperative  EYES: no discharge, no injection  HENT:  Normocephalic. Moist mucus membranes.  NECK:  Supple, symmetric  LUNGS:  Clear to auscultation bilaterally without rhonchi, rales, or wheeze. Chest rise symmetric and no tenderness to palpation.  HEART:  Regular rate & rhythm. No murmur, gallop, or rub.  ABDOMEN:  Soft, non-tender, non-distended. Bowel sounds are present. No palpable masses  EXTREMITIES:  No gross deformities, moves all 4 limbs spontaneously, no peripheral edema  SKIN:  Warm and dry, no rash or suspicious lesions    NEUROLOGIC: alert, sensation grossly intact.    Diagnostic Test Results:  CXR - negative (9/30/2019)    ASSESSMENT / PLAN:       ICD-10-CM    1. Encounter for Medicare annual wellness exam Z00.00    2. Encounter for completion of form with patient Z02.89    3. Need for prophylactic vaccination and inoculation against influenza Z23 INFLUENZA (HIGH DOSE) 3 VALENT VACCINE [58311]     Vaccine Administration, Initial [49947]     Form completed and returned to patient.    COUNSELING:  Reviewed preventive health counseling, as reflected in patient instructions       Immunizations    Vaccinated for: Influenza          Estimated body mass index is 27.81 kg/m  as calculated from the following:    Height as of this encounter: 1.626 m (5' 4\").    Weight as of this encounter: 73.5 kg (162 lb).         reports that he has never smoked. He has never used smokeless tobacco.      Appropriate preventive services were discussed with this patient, including applicable screening as appropriate for cardiovascular disease, diabetes, " osteopenia/osteoporosis, and glaucoma.  As appropriate for age/gender, discussed screening for colorectal cancer, prostate cancer, breast cancer, and cervical cancer. Checklist reviewing preventive services available has been given to the patient.    Reviewed patients plan of care and provided an AVS. The Basic Care Plan (routine screening as documented in Health Maintenance) for Asha meets the Care Plan requirement. This Care Plan has been established and reviewed with the Patient.    Counseling Resources:  ATP IV Guidelines  Pooled Cohorts Equation Calculator  Breast Cancer Risk Calculator  FRAX Risk Assessment  ICSI Preventive Guidelines  Dietary Guidelines for Americans, 2010  USDA's MyPlate  ASA Prophylaxis  Lung CA Screening    Marcia Borja PA-C  [facility]    Identified Health Risks:

## 2019-12-04 ENCOUNTER — PATIENT OUTREACH (OUTPATIENT)
Dept: GERIATRIC MEDICINE | Facility: CLINIC | Age: 84
End: 2019-12-04

## 2019-12-04 NOTE — PROGRESS NOTES
[facility] Contact  CC received notification of Emergency Room visit via [facility] in-basket:  María Teresa Halimo S RN   Asha Sanford had an ER visit on 12/3 to [facility] due to chest pain, headache and palpitations     CC contacted member and left a message requesting a return call.   Member has a follow-up appointment with PCP:   Future Appointments   Date Time Provider Department Center   1/21/2020  2:00 PM Edwardo Moser MD [facility]     PCP notified of ED visit via EMR.  Daphne Ochoa RN BSN PHN  [facility]   RN Care Coordinator   Phone:   [phone]  Fax:       [phone]

## 2019-12-18 ENCOUNTER — TELEPHONE (OUTPATIENT)
Dept: FAMILY MEDICINE | Facility: CLINIC | Age: 84
End: 2019-12-18

## 2019-12-18 NOTE — TELEPHONE ENCOUNTER
Our goal is to have forms completed within 72 hours, however some forms may require a visit or additional information.    What clinic location was the form placed at [facility] or [place].?     Who is the form from?   Where did the form come from? Faxed to clinic   The form was placed in the inbox of Edwardo Moser Jr MD      Please fax to [phone]  Phone number: [phone]    Additional comments: [facility] - incontinence supply order prescription    Call take on 12/18/2019 at 1:34 PM by Eugene Benoit

## 2020-01-19 ENCOUNTER — OFFICE VISIT (OUTPATIENT)
Dept: URGENT CARE | Facility: URGENT CARE | Age: 85
End: 2020-01-19
Payer: COMMERCIAL

## 2020-01-19 VITALS
HEART RATE: 78 BPM | DIASTOLIC BLOOD PRESSURE: 70 MMHG | RESPIRATION RATE: 20 BRPM | OXYGEN SATURATION: 96 % | WEIGHT: 157 LBS | BODY MASS INDEX: 26.95 KG/M2 | SYSTOLIC BLOOD PRESSURE: 150 MMHG | TEMPERATURE: 99.6 F

## 2020-01-19 DIAGNOSIS — R05.9 COUGH: Primary | ICD-10-CM

## 2020-01-19 DIAGNOSIS — R50.9 FEVER, UNSPECIFIED: ICD-10-CM

## 2020-01-19 LAB
FLUAV+FLUBV AG SPEC QL: NEGATIVE
FLUAV+FLUBV AG SPEC QL: NEGATIVE
SPECIMEN SOURCE: NORMAL

## 2020-01-19 PROCEDURE — 87804 INFLUENZA ASSAY W/OPTIC: CPT | Performed by: INTERNAL MEDICINE

## 2020-01-19 PROCEDURE — 99213 OFFICE O/P EST LOW 20 MIN: CPT | Performed by: INTERNAL MEDICINE

## 2020-01-19 RX ORDER — BENZONATATE 100 MG/1
100 CAPSULE ORAL 3 TIMES DAILY PRN
Qty: 20 CAPSULE | Refills: 0 | Status: SHIPPED | OUTPATIENT
Start: 2020-01-19 | End: 2021-04-20

## 2020-01-19 NOTE — PATIENT INSTRUCTIONS

## 2020-01-19 NOTE — PROGRESS NOTES
SUBJECTIVE:  Asha Sanford, a 85 year old male, presents for evaluation of cough and fever.  Temp up to 102 last night. Onset of the fevers three days ago (within 72 hours).  Associated cough and some chest discomfort and headache.  Chest pain is a pleuritic sensation associated with cough.  No sore throat.  Denies known exposure to influenza.  No nasal congestion.     PMH: DM2, HTN. Lipids,     OBJECTIVE:  BP (!) 150/70 (Cuff Size: Adult Regular)   Pulse 78   Temp 99.6  F (37.6  C) (Oral)   Resp 20   Wt 71.2 kg (157 lb)   SpO2 96%   BMI 26.95 kg/m    GENERAL: elderly male, no apparent distress   HENT: ear canals and TM's normal and mild posterior OP erythema  NECK: no adenopathy, no asymmetry, masses, or scars and thyroid normal to palpation  RESP: clear to auscultation and percussion bilaterally; normal I:E ratio  CV: regular rates and rhythm, normal S1 S2, no S3 or S4 and no murmur, click or rub -    Influenza A/B antigen is negative    ASSESSMENT/PLAN:    ICD-10-CM    1. Cough R05 Influenza A/B antigen   2. Fever, unspecified R50.9 Influenza A/B antigen       Justyn Ayala MD

## 2020-02-20 ENCOUNTER — TELEPHONE (OUTPATIENT)
Dept: FAMILY MEDICINE | Facility: CLINIC | Age: 85
End: 2020-02-20

## 2020-02-20 NOTE — TELEPHONE ENCOUNTER
Panel Management Review      Patient has the following on his problem list:     Diabetes    ASA: Passed    Last A1C  Lab Results   Component Value Date    A1C 5.3 07/15/2019    A1C 5.4 09/18/2018    A1C 5.6 06/01/2018    A1C 5.4 07/18/2017    A1C 5.7 08/22/2016     A1C tested: Passed    Last LDL:    Lab Results   Component Value Date    CHOL 201 07/15/2019     Lab Results   Component Value Date    HDL 48 07/15/2019     Lab Results   Component Value Date     07/15/2019     Lab Results   Component Value Date    TRIG 154 07/15/2019     Lab Results   Component Value Date    CHOLHDLRATIO 2.2 05/06/2015     Lab Results   Component Value Date    NHDL 153 07/15/2019       Is the patient on a Statin? YES             Is the patient on Aspirin? YES    Medications     HMG CoA Reductase Inhibitors     atorvastatin (LIPITOR) 20 MG tablet       Salicylates     aspirin (ASA) 81 MG tablet             Last three blood pressure readings:  BP Readings from Last 3 Encounters:   01/19/20 (!) 150/70   11/29/19 124/72   10/08/19 124/70       Date of last diabetes office visit: 7/15/2019     Tobacco History:     History   Smoking Status     Never Smoker   Smokeless Tobacco     Never Used         Hypertension   Last three blood pressure readings:  BP Readings from Last 3 Encounters:   01/19/20 (!) 150/70   11/29/19 124/72   10/08/19 124/70     Blood pressure: FAILED    HTN Guidelines:  Less than 140/90      Composite cancer screening  Chart review shows that this patient is due/due soon for the following None  Summary:    Patient is due/failing the following:   A1C    Action needed:   Patient needs office visit for diabetes, hypertension.    Type of outreach:    Sent letter.    Questions for provider review:    None                                                                                                                                    Mary Mcfarlane CMA

## 2020-02-28 ENCOUNTER — PATIENT OUTREACH (OUTPATIENT)
Dept: GERIATRIC MEDICINE | Facility: CLINIC | Age: 85
End: 2020-02-28

## 2020-02-28 NOTE — PROGRESS NOTES
[facility] Care Coordination Contact      [facility] Six-Month Telephone Assessment    6 month telephone assessment completed on 02/28/2020.    ER visits: Yes -  [facility] , 3x for lightheadedness, chest pain and influenza  Hospitalizations: No  TCU stays: No  Significant health status changes: n/a  Falls/Injuries: No  ADL/IADL changes: No  Changes in services: No    Caregiver Assessment follow up:  n/a    Goals: See POC in chart for goal progress documentation.     Will see member in 6 months for an annual health risk assessment.   Encouraged member to call CC with any questions or concerns in the meantime.     Daphne Ochoa RN BSN PHN  [facility]   RN Care Coordinator   Phone:   [phone]  Fax:       [phone]

## 2020-06-04 ENCOUNTER — VIRTUAL VISIT (OUTPATIENT)
Dept: FAMILY MEDICINE | Facility: CLINIC | Age: 85
End: 2020-06-04
Payer: COMMERCIAL

## 2020-06-04 DIAGNOSIS — H57.11 ACUTE PAIN IN RIGHT EYE: Primary | ICD-10-CM

## 2020-06-04 PROCEDURE — 99213 OFFICE O/P EST LOW 20 MIN: CPT | Mod: 95 | Performed by: FAMILY MEDICINE

## 2020-06-04 NOTE — NURSING NOTE
Faxed Eye referral to [facility] to # [phone] today and mailed AVS to patient.  Marie Jean LPN

## 2020-06-04 NOTE — PATIENT INSTRUCTIONS
[facility] [phone]   [email or web address]  [facility] [phone]   [email or web address]    [address]  THINKS HE MIGHT HAVE A FOREIGN BODY    RIGHT EYE    Dr. Masood [facility]  [address] [place], [place] [id number] [phone]

## 2020-06-04 NOTE — PROGRESS NOTES
"Asha Sanford is a 85 year old male who is being evaluated via a billable telephone visit.      The patient has been notified of following:     \"This telephone visit will be conducted via a call between you and your physician/provider. We have found that certain health care needs can be provided without the need for a physical exam.  This service lets us provide the care you need with a short phone conversation.  If a prescription is necessary we can send it directly to your pharmacy.  If lab work is needed we can place an order for that and you can then stop by our lab to have the test done at a later time.    Telephone visits are billed at different rates depending on your insurance coverage. During this emergency period, for some insurers they may be billed the same as an in-person visit.  Please reach out to your insurance provider with any questions.    If during the course of the call the physician/provider feels a telephone visit is not appropriate, you will not be charged for this service.\"    Patient has given verbal consent for Telephone visit?  Yes    What phone number would you like to be contacted at? [phone]    How would you like to obtain your AVS? Mail a copy    Subjective     Asha Sanford is a 85 year old male who presents via phone visit today for the following health issues:    HPI   Eye(s) Problem      Duration:     Description:  Location: right  Pain: YES  Redness: YES  Discharge: no     Accompanying signs and symptoms:  NO     History (Trauma, foreign body exposure,): None    Precipitating or alleviating factors (contact use): None    Therapies tried and outcome: None    HISTORY OF DRY EYE SYNDROME   WANTS REFERRAL EITHER EMERGENCY ROOM OR EYE CLINIC           There are no preventive care reminders to display for this patient.          .  Current Outpatient Medications   Medication Sig Dispense Refill     ACE/ARB NOT PRESCRIBED, INTENTIONAL, ACE & ARB not prescribed due to " Intolerance and Other: hypokalemia x 2 episodes       acetaminophen (MAPAP) 500 MG tablet TAKE 1 TABLET BY MOUTH EVERY 6 HOURS AS NEEDED 100 tablet 5     albuterol (PROAIR HFA/PROVENTIL HFA/VENTOLIN HFA) 108 (90 Base) MCG/ACT inhaler Inhale 2 puffs into the lungs every 6 hours 18 g 0     aspirin (ASA) 81 MG tablet Take 1 tablet (81 mg) by mouth daily Correct ASA 30 tablet 11     aspirin-acetaminophen-caffeine (EXCEDRIN MIGRAINE) 250-250-65 MG tablet Take 1 tablet by mouth every 6 hours as needed for headaches 80 tablet 1     atorvastatin (LIPITOR) 20 MG tablet Take 1 tablet (20 mg) by mouth daily 90 tablet 3     benzonatate (TESSALON) 100 MG capsule Take 1 capsule (100 mg) by mouth 3 times daily as needed for cough 20 capsule 0     blood glucose (NO BRAND SPECIFIED) lancets standard Use to test blood sugar  dialy times daily or as directed. 1 Box 0     blood glucose calibration (NO BRAND SPECIFIED) solution Use to calibrate blood glucose monitor as directed. 1 each 11     blood glucose monitoring (NO BRAND SPECIFIED) meter device kit Use to test blood sugar  Twice daily  times daily or as directed. 1 kit 0     blood glucose monitoring (NO BRAND SPECIFIED) test strip Use to test blood sugars  Daily  times daily or as directed 100 strip 0     carbidopa-levodopa (SINEMET)  MG tablet TAKE 1/2 TABLET BY MOUTH THREE TIMES DAILY 135 tablet 0     finasteride (PROSCAR) 5 MG tablet Take 1 tablet (5 mg) by mouth daily 90 tablet 11     gabapentin (NEURONTIN) 100 MG capsule Take 1 capsule (100 mg) by mouth 2 times daily 180 capsule 11     metoprolol succinate ER (TOPROL-XL) 50 MG 24 hr tablet Take 1 tablet (50 mg) by mouth daily 90 tablet 2     omeprazole (PRILOSEC) 20 MG DR capsule TAKE ONE CAPSULE BY MOUTH ONCE DAILY EVERY MORNING ONE-HALF HOUR BEFORE A MEAL 90 capsule 3     polyethylene glycol-propylene glycol (SYSTANE) 0.4-0.3 % SOLN ophthalmic solution Place 1 drop into both eyes 4 times daily as needed for dry eyes 1  Bottle 11     polyvinyl alcohol (ARTIFICIAL TEARS) 1.4 % ophthalmic solution Place 1 drop into both eyes as needed for dry eyes 15 mL 11     tamsulosin (FLOMAX) 0.4 MG capsule TAKE 1 CAPSULE BY MOUTH EVERY DAY 90 capsule 11     vitamin D2 (ERGOCALCIFEROL) 86851 units (1250 mcg) capsule TAKE 1 CAPSULE BY MOUTH EVERY WEEK 12 capsule 11              No Known Allergies      Immunization History   Administered Date(s) Administered     HepA-Adult 2007     Influenza (High Dose) 3 valent vaccine 10/18/2013, 09/15/2015, 10/18/2016, 2019     Influenza (IIV3) PF 2007, 10/21/2008, 10/11/2014     Influenza Vaccine IM > 6 months Valent IIV4 2018     Pneumo Conj 13-V (2010&after) 2017     Pneumococcal 23 valent 2004, 2007, 10/11/2014     Poliovirus, inactivated (IPV) 2007     TD (ADULT, 7+) 1998     Zoster vaccine, live 10/11/2014               reports no history of alcohol use.          reports no history of drug use.        family history includes Unknown/Adopted in his father and mother.        He indicated that his mother is . He indicated that his father is .             has a past surgical history that includes SURGICAL PATHOLOGY (2010); Eye surgery (); and shoulder surgery.         reports never being sexually active.    .  Pediatric History   Patient Parents     Not on file     Other Topics Concern     Parent/sibling w/ CABG, MI or angioplasty before 65F 55M? No     Comment: unknown   Social History Narrative     Not on file               reports that he has never smoked. He has never used smokeless tobacco.        Medical, social, surgical, and family histories reviewed.        Labs reviewed in EPIC  Patient Active Problem List   Diagnosis     Health Care Home     Dysuria     Essential and other specified forms of tremor     Abnormal involuntary movement     Lipoma     Cervical spondylosis without myelopathy     Degeneration of cervical  intervertebral disc     Vitamin D deficiency     Memory loss     Obesity     Insomnia     Gastroesophageal reflux disease without esophagitis     Hyperlipidemia LDL goal <100     Pain in joint involving lower leg     Hypertension goal BP (blood pressure) < 140/80     Type 2 diabetes mellitus without complications (H)     Lumbago     Chronic pain of both knees     Headache     Nausea     UTI (urinary tract infection)     Lung nodule seen on imaging study     Bladder stones     ACP (advance care planning)     Hyponatremia     Tension headache     Encounter for diabetic foot exam (H)     Calcified granuloma of lung (H)       Past Surgical History:   Procedure Laterality Date     CL AFF SURGICAL PATHOLOGY  7/1/2010     EYE SURGERY  2003    retinal detatch     SHOULDER SURGERY           Social History     Tobacco Use     Smoking status: Never Smoker     Smokeless tobacco: Never Used   Substance Use Topics     Alcohol use: No       Family History   Problem Relation Age of Onset     Unknown/Adopted Mother      Unknown/Adopted Father              Current Outpatient Medications   Medication Sig Dispense Refill     ACE/ARB NOT PRESCRIBED, INTENTIONAL, ACE & ARB not prescribed due to Intolerance and Other: hypokalemia x 2 episodes       acetaminophen (MAPAP) 500 MG tablet TAKE 1 TABLET BY MOUTH EVERY 6 HOURS AS NEEDED 100 tablet 5     albuterol (PROAIR HFA/PROVENTIL HFA/VENTOLIN HFA) 108 (90 Base) MCG/ACT inhaler Inhale 2 puffs into the lungs every 6 hours 18 g 0     aspirin (ASA) 81 MG tablet Take 1 tablet (81 mg) by mouth daily Correct ASA 30 tablet 11     aspirin-acetaminophen-caffeine (EXCEDRIN MIGRAINE) 250-250-65 MG tablet Take 1 tablet by mouth every 6 hours as needed for headaches 80 tablet 1     atorvastatin (LIPITOR) 20 MG tablet Take 1 tablet (20 mg) by mouth daily 90 tablet 3     benzonatate (TESSALON) 100 MG capsule Take 1 capsule (100 mg) by mouth 3 times daily as needed for cough 20 capsule 0     blood glucose  (NO BRAND SPECIFIED) lancets standard Use to test blood sugar  dialy times daily or as directed. 1 Box 0     blood glucose calibration (NO BRAND SPECIFIED) solution Use to calibrate blood glucose monitor as directed. 1 each 11     blood glucose monitoring (NO BRAND SPECIFIED) meter device kit Use to test blood sugar  Twice daily  times daily or as directed. 1 kit 0     blood glucose monitoring (NO BRAND SPECIFIED) test strip Use to test blood sugars  Daily  times daily or as directed 100 strip 0     carbidopa-levodopa (SINEMET)  MG tablet TAKE 1/2 TABLET BY MOUTH THREE TIMES DAILY 135 tablet 0     finasteride (PROSCAR) 5 MG tablet Take 1 tablet (5 mg) by mouth daily 90 tablet 11     gabapentin (NEURONTIN) 100 MG capsule Take 1 capsule (100 mg) by mouth 2 times daily 180 capsule 11     metoprolol succinate ER (TOPROL-XL) 50 MG 24 hr tablet Take 1 tablet (50 mg) by mouth daily 90 tablet 2     omeprazole (PRILOSEC) 20 MG DR capsule TAKE ONE CAPSULE BY MOUTH ONCE DAILY EVERY MORNING ONE-HALF HOUR BEFORE A MEAL 90 capsule 3     polyethylene glycol-propylene glycol (SYSTANE) 0.4-0.3 % SOLN ophthalmic solution Place 1 drop into both eyes 4 times daily as needed for dry eyes 1 Bottle 11     polyvinyl alcohol (ARTIFICIAL TEARS) 1.4 % ophthalmic solution Place 1 drop into both eyes as needed for dry eyes 15 mL 11     tamsulosin (FLOMAX) 0.4 MG capsule TAKE 1 CAPSULE BY MOUTH EVERY DAY 90 capsule 11     vitamin D2 (ERGOCALCIFEROL) 91243 units (1250 mcg) capsule TAKE 1 CAPSULE BY MOUTH EVERY WEEK 12 capsule 11           Recent Labs   Lab Test 07/15/19  1425 09/18/18  1636 06/01/18  1551 07/18/17  1049  05/05/15  0818   A1C 5.3 5.4 5.6 5.4   < >  --    *  --  156* 128*   < >  --    HDL 48  --  45 55   < >  --    TRIG 154*  --  180* 123   < >  --    ALT 21  --  26 22   < > 26   CR 1.05  --  1.02 0.95   < > 0.86   GFRESTIMATED 66  --  70 76   < > 87   GFRESTBLACK 76  --  85 >90   GFR Calc     < >  >90   GFR Calc     POTASSIUM 4.5  --  4.3 4.3   < > 3.6   TSH  --   --   --  0.89  --  0.56    < > = values in this interval not displayed.            BP Readings from Last 6 Encounters:   01/19/20 (!) 150/70   11/29/19 124/72   10/08/19 124/70   09/30/19 (!) 140/71   07/15/19 116/57   01/29/19 136/66           Wt Readings from Last 3 Encounters:   01/19/20 71.2 kg (157 lb)   11/29/19 73.5 kg (162 lb)   10/08/19 72.6 kg (160 lb)                 Positive symptoms or findings indicated by bold designation:         ROS: 10 point ROS neg other than the symptoms noted above in the HPI.except  has Health Care Home; Dysuria; Essential and other specified forms of tremor; Abnormal involuntary movement; Lipoma; Cervical spondylosis without myelopathy; Degeneration of cervical intervertebral disc; Vitamin D deficiency; Memory loss; Obesity; Insomnia; Gastroesophageal reflux disease without esophagitis; Hyperlipidemia LDL goal <100; Pain in joint involving lower leg; Hypertension goal BP (blood pressure) < 140/80; Type 2 diabetes mellitus without complications (H); Lumbago; Chronic pain of both knees; Headache; Nausea; UTI (urinary tract infection); Lung nodule seen on imaging study; Bladder stones; ACP (advance care planning); Hyponatremia; Tension headache; Encounter for diabetic foot exam (H); and Calcified granuloma of lung (H) on their problem list.  Review Of Systems    Skin: negative    Eyes: SEE HISTORY OF PRESENT ILLNESS     Ears/Nose/Throat:  CHRONIC NECK PAIN     Respiratory:  HISTORY OF LUNG NODULE     Cardiovascular: negative    Gastrointestinal: GASTROESOPHAGEAL REFLUX DISEASE WITHOUT ESOPHAGITIS     Genitourinary: HISTORY OF URINARY TRACT INFECTION     HISTORY OF BLADDER STONES    Musculoskeletal: back pain    BILATERAL OSTEOARTHRITIS KNEE PAIN     CHRONIC LOWER BACK PAIN     Neurologic: HEADACHES with NAUSEA     TREMOR ESSENTIAL     HISTORY OF HYPONATREMIA     Psychiatric:  "negative    Hematologic/Lymphatic/Immunologic: negative    Endocrine: DIABETES 2 GOAL 8%     HYPERTENSION WITH GOAL OF LESS THAN 140/80     LDL OR \"BAD\" CHOLESTEROL  GOAL < 100         Psychiatric: orientation/consciousness, overall: oriented to person, place and time; ; speech, overall: normal quality, no aphasia and normal quality, quantity, intact.          Diagnostic Test Results:    none         No diagnosis found.         .    Side effects benefits and risks thoroughly discussed. .he may come in early if unimproved or getting worse          Please drink 2 glasses of water prior to meals and walk 15-30 minutes after meals        I spent 25 MINUTES SPENT  with patient discussing the following issues   There were no encounter diagnoses. over half of which involved counseling and coordination of care.      Patient Instructions   [facility] [phone]   [email or web address]  [facility] [phone]   [email or web address]    [address]  THINKS HE MIGHT HAVE A FOREIGN BODY    RIGHT EYE    Dr. Masood [place] [phone]              ALL THE ABOVE PROBLEMS ARE STABLE AND MED CHANGES AS NOTED        Diet: MEDITERRANEAN DIET         Exercise:  AS TOLERATED KNEE PAIN AND LOWER BACK PAIN AND NECK PAIN  Exercises Range of motion, balance, isometric, and strengthening exercises 30 repetitions twice daily of involved joints            .AMY BURNS MD 6/4/2020 9:26 AM  June 4, 2020                    "

## 2020-06-16 ENCOUNTER — PATIENT OUTREACH (OUTPATIENT)
Dept: GERIATRIC MEDICINE | Facility: CLINIC | Age: 85
End: 2020-06-16

## 2020-06-16 NOTE — PROGRESS NOTES
[facility] Care Coordination Contact    Placed a call to member and discussed [facility] closure and Dr. Moser retiring.    Member is choosing Dr. Masood Wesley as new PCP and clinic as [facility].  Member is also requesting PCP/clinic address and contact information to be mailed to him.  Daphne Ochoa RN BSN PHN  [facility]   RN Care Coordinator   Phone:   [phone]  Fax:       [phone]

## 2020-07-15 NOTE — LETTER
"June 5, 2018      Asha Sanford  [address]  [place] [address]        Dear ,    We are writing to inform you of your test results.  NORMAL DIABETES URINE PROTEIN TEST   NORMAL LIVER FUNCTION TEST   NORMAL GLUCOSE, RENAL AND BLOOD SALTS     HIGH TOTAL CHOLESTEROL   BORDERLINE  HIGH TRIGLYCERIDES   NORMAL HDL OR \"GOOD\" CHOLESTEROL   MODERATE HIGH LDL OR \"BAD\" CHOLESTEROL   BORDERLINE  HIGH VERY LOW DENSITY CHOLESTEROL       Resulted Orders   HEMOGLOBIN A1C   Result Value Ref Range    Hemoglobin A1C 5.6 0 - 5.6 %      Comment:      Normal <5.7% Prediabetes 5.7-6.4%  Diabetes 6.5% or higher - adopted from ADA   consensus guidelines.     Basic metabolic panel   Result Value Ref Range    Sodium 140 133 - 144 mmol/L    Potassium 4.3 3.4 - 5.3 mmol/L    Chloride 103 94 - 109 mmol/L    Carbon Dioxide 28 20 - 32 mmol/L    Anion Gap 9 3 - 14 mmol/L    Glucose 72 70 - 99 mg/dL    Urea Nitrogen 15 7 - 30 mg/dL    Creatinine 1.02 0.66 - 1.25 mg/dL    GFR Estimate 70 >60 mL/min/1.7m2      Comment:      Non  GFR Calc    GFR Estimate If Black 85 >60 mL/min/1.7m2      Comment:       GFR Calc    Calcium 9.4 8.5 - 10.1 mg/dL   Albumin Random Urine Quantitative with Creat Ratio   Result Value Ref Range    Creatinine Urine 111 mg/dL    Albumin Urine mg/L 6 mg/L    Albumin Urine mg/g Cr 5.75 0 - 17 mg/g Cr   Lipid panel reflex to direct LDL Fasting   Result Value Ref Range    Cholesterol 237 (H) <200 mg/dL      Comment:      Desirable:       <200 mg/dl    Triglycerides 180 (H) <150 mg/dL      Comment:      Borderline high:  150-199 mg/dl  High:             200-499 mg/dl  Very high:       >499 mg/dl      HDL Cholesterol 45 >39 mg/dL    LDL Cholesterol Calculated 156 (H) <100 mg/dL      Comment:      Above desirable:  100-129 mg/dl  Borderline High:  130-159 mg/dL  High:             160-189 mg/dL  Very high:       >189 mg/dl      Non HDL Cholesterol 192 (H) <130 " mg/dL      Comment:      Above Desirable:  130-159 mg/dl  Borderline high:  160-189 mg/dl  High:             190-219 mg/dl  Very high:       >219 mg/dl     ALT   Result Value Ref Range    ALT 26 0 - 70 U/L       If you have any questions or concerns, please call the clinic at the number listed above.       Sincerely,        AMY BURNS MD                 No

## 2020-07-17 ENCOUNTER — PATIENT OUTREACH (OUTPATIENT)
Dept: GERIATRIC MEDICINE | Facility: CLINIC | Age: 85
End: 2020-07-17

## 2020-07-17 ASSESSMENT — PATIENT HEALTH QUESTIONNAIRE - PHQ9: SUM OF ALL RESPONSES TO PHQ QUESTIONS 1-9: 7

## 2020-07-17 ASSESSMENT — ACTIVITIES OF DAILY LIVING (ADL): DEPENDENT_IADLS:: CLEANING;COOKING;LAUNDRY;SHOPPING;MEAL PREPARATION;MEDICATION MANAGEMENT;TRANSPORTATION

## 2020-07-17 NOTE — PROGRESS NOTES
[facility] Care Coordination Contact    [facility] Home Visit Assessment     COVID-19 - Remote/Telephonic Assessment     Health Risk Assessment with Asha Sanford completed on July 17, 2020    Type of residence:: Apartment  Current living arrangement:: I live alone     Assessment completed with:: Patient    Current Care Plan  Member currently receiving the following home care services:   N/A  Member currently receiving the following community resources: Day Care, ADC transportation, PCA and Homemaking.      Medication Review  Medication reconciliation completed in Epic: No  Medication set-up & administration: Family/informal caregiver sets up daily.  Family caregiver administers medications.  Medication Risk Assessment Medication (1 or more, place referral to MTM): Lacks understanding of medication regimen and Taking 1 or more high-risk medications for adults >65 years  MTM Referral Placed: No: Declines at this time.    Mental/Behavioral Health   Depression Screening:      PHQ-9 Total Score: 7    Mental health DX:: Yes(Insomnia)        ADL/IADL Dependencies:   Dependent ADLs:: Ambulation-walker, Bathing, Dressing, Grooming, Transfers  Dependent IADLs:: Cleaning, Cooking, Laundry, Shopping, Meal Preparation, Medication Management, Transportation    [facility] sponsored benefits: Shared information re: Silver Sneakers/gym memberships, ASA, Calcium +D.    PCA Assessment completed at visit: Yes Annual PCA assessment indicated 14 units per day of PCA. This is the same as the previous assessment.     Elderly Waiver Eligibility: Yes-will continue on EW    Care Plan & Recommendations: Member reports 3 ED visits but no hospitalization.  Plan is to continue current POC.  Discussed [facility] clinic closure.  Member is considering [facility] and prefers actual clinic visits rather than telephonic visits.  Dicussed that actual clinic visits can be conducted at [facility]  at [place].  Member stated he will discussed with daughter/family and will update CC on who he chooses as PCP/Clinic.    See [facility] for detailed assessment information.    Follow-Up Plan: Member informed of future contact, plan to f/u with member with a 6 month telephone assessment.  Contact information shared with member and family, encouraged member to call with any questions or concerns at any time.    [facility] continuum providers: Please refer to Health Care Home on the Epic Problem List to view this patient's [facility] Care Plan Summary.    Daphne Ochoa RN BSN PHN      [facility]  Care Coordinator  Phone:   [phone]  Fax:       [phone]

## 2020-07-30 ENCOUNTER — PATIENT OUTREACH (OUTPATIENT)
Dept: GERIATRIC MEDICINE | Facility: CLINIC | Age: 85
End: 2020-07-30

## 2020-07-30 NOTE — LETTER
July 30, 2020      ASHA ALEMAN  [address]  [place] [address]      Dear Asha:    At [facility], we are dedicated to improving your health and well-being. Enclosed is the Comprehensive Care Plan that we developed with you on 7/17/2020. Please review the Care Plan carefully.    As a reminder, some of the things we discussed at your visit include:    Your physical and mental health    Ways to reduce falls    Health care needs you may have    Don t forget to contact your care coordinator if you:    Have been hospitalized or plan to be hospitalized     Have had a fall     Have experienced a change in physical health    Are experiencing emotional problems     If you do not agree with your Care Plan, have questions about it, or have experienced a change in your needs, please call me at [phone]. If you are hearing impaired, please call the [place] [employer] at 520 or [phone] (dejoxu-lu-[id number] relay service).    Sincerely,    CIRSTY Middleton, RN, PHN    E-mail: [email or web address]  Phone: [phone]      [facility] ([facility]) is a health plan that contracts with both Medicare and the [place] Medical Assistance (Medicaid) program to provide benefits of both programs to enrollees. Enrollment in [facility] depends on contract renewal.    [id number])     [id number] (11/18)

## 2020-08-03 ENCOUNTER — VIRTUAL VISIT (OUTPATIENT)
Dept: FAMILY MEDICINE | Facility: CLINIC | Age: 85
End: 2020-08-03
Payer: COMMERCIAL

## 2020-08-03 DIAGNOSIS — R69 DIAGNOSIS DEFERRED: Primary | ICD-10-CM

## 2020-08-03 PROCEDURE — 99207 ZZC NO BILLABLE SERVICE THIS VISIT: CPT | Mod: TEL | Performed by: FAMILY MEDICINE

## 2020-08-03 NOTE — PROGRESS NOTES
Pt wanted eye referral but already is seen by eye clinic. He will schedule appt with his eye provider.   AALIYAH

## 2020-09-02 ENCOUNTER — NURSE TRIAGE (OUTPATIENT)
Dept: NURSING | Facility: CLINIC | Age: 85
End: 2020-09-02

## 2020-09-02 NOTE — TELEPHONE ENCOUNTER
Caller was a representative from [facility] who called to find out why the documents they requested for about the patient are not released.  Caller has the medical record number already, wanted the provider's name and the fax number to the clinic.  Jody Wei RN    Reason for Disposition    [1] Caller requesting NON-URGENT health information AND [2] PCP's office is the best resource     Release of information    Additional Information    Negative: [1] Caller is not with the adult (patient) AND [2] reporting urgent symptoms    Negative: Lab result questions    Negative: Medication questions    Negative: Caller can't be reached by phone    Negative: Caller has already spoken to PCP or another triager    Negative: RN needs further essential information from caller in order to complete triage    Negative: Requesting regular office appointment    Protocols used: INFORMATION ONLY CALL-A-

## 2020-10-26 ENCOUNTER — PATIENT OUTREACH (OUTPATIENT)
Dept: GERIATRIC MEDICINE | Facility: CLINIC | Age: 85
End: 2020-10-26

## 2020-10-26 NOTE — PROGRESS NOTES
[facility] Care Coordination Contact    Arranged transportation thru [facility] PAR for the below appt:  Appt Date & Time: 12/4/20 at 10:20AM  Clinic Name & Address:  [facility] [place] [facility]  Transportation Provider: [employer] ([phone])   time:  9:20-9:50AM    Notified member of  time.    **Per CC, katerina. Got moved to 12/1/20, rescheduled transportation**    Jennifer Ennis  Care Management Specialist  [facility]  [phone]

## 2020-11-22 ENCOUNTER — TELEPHONE (OUTPATIENT)
Dept: FAMILY MEDICINE | Facility: CLINIC | Age: 85
End: 2020-11-22

## 2020-11-22 NOTE — TELEPHONE ENCOUNTER
Reason for call:  Other   Patient called regarding (reason for call): prescription  Additional comments: Patient is requesting a refill of BP medication     Phone number to reach patient:  Cell number on file:    Telephone Information:   Mobile [phone] Daughter [phone]        Best Time:  Anytime    Can we leave a detailed message on this number?  YES    Travel screening: Not Applicable

## 2020-11-23 NOTE — TELEPHONE ENCOUNTER
Ok to fill 1 month of rx of needed meds. He is scheduled with me for next month and I can refill meds after that.

## 2020-11-24 ENCOUNTER — PATIENT OUTREACH (OUTPATIENT)
Dept: GERIATRIC MEDICINE | Facility: CLINIC | Age: 85
End: 2020-11-24

## 2020-11-24 NOTE — PROGRESS NOTES
[facility] Care Coordination Contact    Arranged transportation thru [facility] PAR for the below appt:  Appt Date & Time: 12/1/2020 at 2:20PM  Clinic Name & Address:  [facility]   Transportation Provider: [employer] ([phone])   time:  1:35-1:55PM    Notified member of  time.    **APPOINTMENT GOT RESCHEDULED TO 12/8/20**    Jennifer Ennis  Care Management Specialist  [facility]  [phone]

## 2020-12-01 ENCOUNTER — OFFICE VISIT (OUTPATIENT)
Dept: FAMILY MEDICINE | Facility: CLINIC | Age: 85
End: 2020-12-01
Payer: COMMERCIAL

## 2020-12-01 VITALS
HEIGHT: 64 IN | OXYGEN SATURATION: 97 % | WEIGHT: 158 LBS | TEMPERATURE: 98 F | SYSTOLIC BLOOD PRESSURE: 112 MMHG | BODY MASS INDEX: 26.98 KG/M2 | RESPIRATION RATE: 16 BRPM | DIASTOLIC BLOOD PRESSURE: 58 MMHG | HEART RATE: 60 BPM

## 2020-12-01 DIAGNOSIS — R25.1 TREMOR: ICD-10-CM

## 2020-12-01 DIAGNOSIS — Z60.2 LIVES ALONE WITH HELP AVAILABLE: ICD-10-CM

## 2020-12-01 DIAGNOSIS — Z00.00 ENCOUNTER FOR MEDICARE ANNUAL WELLNESS EXAM: Primary | ICD-10-CM

## 2020-12-01 DIAGNOSIS — Z11.1 SCREENING EXAMINATION FOR PULMONARY TUBERCULOSIS: ICD-10-CM

## 2020-12-01 DIAGNOSIS — E11.9 TYPE 2 DIABETES MELLITUS WITHOUT COMPLICATION, WITHOUT LONG-TERM CURRENT USE OF INSULIN (H): Chronic | ICD-10-CM

## 2020-12-01 DIAGNOSIS — H92.03 OTALGIA, BILATERAL: ICD-10-CM

## 2020-12-01 LAB — HBA1C MFR BLD: 5.4 % (ref 0–5.6)

## 2020-12-01 PROCEDURE — 99213 OFFICE O/P EST LOW 20 MIN: CPT | Mod: 25 | Performed by: FAMILY MEDICINE

## 2020-12-01 PROCEDURE — 80061 LIPID PANEL: CPT | Performed by: FAMILY MEDICINE

## 2020-12-01 PROCEDURE — 80053 COMPREHEN METABOLIC PANEL: CPT | Performed by: FAMILY MEDICINE

## 2020-12-01 PROCEDURE — G0439 PPPS, SUBSEQ VISIT: HCPCS | Performed by: FAMILY MEDICINE

## 2020-12-01 PROCEDURE — 86481 TB AG RESPONSE T-CELL SUSP: CPT | Performed by: FAMILY MEDICINE

## 2020-12-01 PROCEDURE — 36415 COLL VENOUS BLD VENIPUNCTURE: CPT | Performed by: FAMILY MEDICINE

## 2020-12-01 PROCEDURE — 82043 UR ALBUMIN QUANTITATIVE: CPT | Performed by: FAMILY MEDICINE

## 2020-12-01 PROCEDURE — 83036 HEMOGLOBIN GLYCOSYLATED A1C: CPT | Performed by: FAMILY MEDICINE

## 2020-12-01 SDOH — SOCIAL STABILITY - SOCIAL INSECURITY: PROBLEMS RELATED TO LIVING ALONE: Z60.2

## 2020-12-01 ASSESSMENT — MIFFLIN-ST. JEOR: SCORE: 1322.68

## 2020-12-01 NOTE — PROGRESS NOTES
"  SUBJECTIVE:   Asha Sanford is a 85 year old male who presents for Preventive Visit.    Patient has been advised of split billing requirements and indicates understanding: Yes  Are you in the first 12 months of your Medicare Part B coverage?  No    Physical Health:    In general, how would you rate your overall physical health? fair    Outside of work, how many days during the week do you exercise? 4-5 days/week    Outside of work, approximately how many minutes a day do you exercise?15-30 minutes    If you drink alcohol do you typically have >3 drinks per day or >7 drinks per week? No    Do you usually eat at least 4 servings of fruit and vegetables a day, include whole grains & fiber and avoid regularly eating high fat or \"junk\" foods? NO    Do you have any problems taking medications regularly?  No    Do you have any side effects from medications? none    Needs assistance for the following daily activities: no assistance needed    Which of the following safety concerns are present in your home?  none identified     Hearing impairment: Yes, Difficulty following a conversation in a noisy restaurant or crowded room.    Feel that people are mumbling or not speaking clearly.    Need to ask people to speak up or repeat themselves.    Difficulty understanding soft or whispered speech.    In the past 6 months, have you been bothered by leaking of urine? no  Additional: ear irritation on left ear and would like to discuss financial assistance for food.     Mental Health:    In general, how would you rate your overall mental or emotional health? fair  PHQ-2 Score:  0    Do you feel safe in your environment? Yes    Have you ever done Advance Care Planning? (For example, a Health Directive, POLST, or a discussion with a medical provider or your loved ones about your wishes): No, advance care planning information given to patient to review.  Patient declined advance care planning discussion at this time.    Additional " concerns to address?  No    Fall risk:  Fallen 2 or more times in the past year?: No  Any fall with injury in the past year?: No    Cognitive Screenin) Repeat 3 items (Leader, Season, Table)    2) Clock draw: unable to understand. States has not written before  3) 3 item recall: Recalls NO objects -does not understand  Results: unable to test     Mini-CogTM Rainer Man. Licensed by the author for use in [facility]; reprinted with permission ([email or web address]). All rights reserved.      Do you have sleep apnea, excessive snoring or daytime drowsiness?: no    Reviewed and updated as needed this visit by clinical staff    Reviewed and updated as needed this visit by Provider    Social History     Tobacco Use     Smoking status: Never Smoker     Smokeless tobacco: Never Used   Substance Use Topics     Alcohol use: No              Current providers sharing in care for this patient include:   Patient Care Team:  Edwardo Moser MD as PCP - General (Family Practice)  Kofi Webb MD as MD (Family Practice)  Daphne Ochoa RN as Lead Care Coordinator (Primary Care - CC)  Marcia Borja PA-C as Assigned PCP    The following health maintenance items are reviewed in Epic and correct as of today:  Health Maintenance   Topic Date Due     DTAP/TDAP/TD IMMUNIZATION (2 - Td) 2008     ZOSTER IMMUNIZATION (2 of 3) 2014     DIABETIC FOOT EXAM  2019     A1C  01/15/2020     BMP  07/15/2020     LIPID  07/15/2020     MICROALBUMIN  07/15/2020     EYE EXAM  2020     INFLUENZA VACCINE (1) 2020     FALL RISK ASSESSMENT  2020     MEDICARE ANNUAL WELLNESS VISIT  2021     ADVANCE CARE PLANNING  2024     PHQ-2  Completed     Pneumococcal Vaccine: 65+ Years  Completed     Pneumococcal Vaccine: Pediatrics (0 to 5 Years) and At-Risk Patients (6 to 64 Years)  Aged Out     IPV IMMUNIZATION  Aged Out     MENINGITIS IMMUNIZATION  Aged Out     Phone .  "    Used to go to . Has pca that helps to cook for him. Lives alone. Feels he would benefit from more hours.   Takes his medications. Does not know all the names of medications.   ROS:  Constitutional, HEENT, cardiovascular, pulmonary, gi and gu systems are negative, except as otherwise noted.    OBJECTIVE:   /58 (BP Location: Left arm, Patient Position: Sitting, Cuff Size: Adult Regular)   Pulse 60   Temp 98  F (36.7  C) (Oral)   Resp 16   Ht 1.626 m (5' 4\")   Wt 71.7 kg (158 lb)   SpO2 97%   BMI 27.12 kg/m   Estimated body mass index is 27.12 kg/m  as calculated from the following:    Height as of this encounter: 1.626 m (5' 4\").    Weight as of this encounter: 71.7 kg (158 lb).  EXAM:   GENERAL: healthy, alert and no distress  EYES: Eyes grossly normal to inspection, PERRL and conjunctivae and sclerae normal  NECK: no adenopathy, no asymmetry, masses, or scars and thyroid normal to palpation  RESP: lungs clear to auscultation - no rales, rhonchi or wheezes  CV: regular rate and rhythm,   ABDOMEN: soft, nontender, no hepatosplenomegaly, no masses and bowel sounds normal  MS: no gross musculoskeletal defects noted, no edema  SKIN: no suspicious lesions or rashes on visible parts   NEURO: tremors present,  mentation intact and speech normal  PSYCH: mentation appears normal, affect normal/bright    ASSESSMENT / PLAN:   1. Encounter for Medicare annual wellness exam       2. Screening examination for pulmonary tuberculosis  For .   - Quantiferon TB Gold Plus    3. Type 2 diabetes mellitus without complication, without long-term current use of insulin (H)  Obtain basic labs.   - Lipid panel reflex to direct LDL Fasting  - Albumin Random Urine Quantitative with Creat Ratio  - Hemoglobin A1c  - Comprehensive metabolic panel    4. Otalgia, bilateral    - carbamide peroxide (DEBROX) 6.5 % otic solution; Place 5 drops into both ears 2 times daily  Dispense: 15 mL; Refill: 0    5. Tremor       6. " "Lives alone with help available   lives alone but states pca provides help with cooking. Offered him home RN assessment due to his age, language barrier and multiple medications. He was quiet adamant that it is not needed as current helper (PCA) is providing needed help. I asked him to bring his meds next time.       Patient has been advised of split billing requirements and indicates understanding: No    COUNSELING:  Reviewed preventive health counseling, as reflected in patient instructions  Special attention given to:       Regular exercise       Healthy diet/nutrition       Vision screening       Hearing screening       Dental care       Bladder control       Fall risk prevention    Estimated body mass index is 27.12 kg/m  as calculated from the following:    Height as of this encounter: 1.626 m (5' 4\").    Weight as of this encounter: 71.7 kg (158 lb).        He reports that he has never smoked. He has never used smokeless tobacco.    Appropriate preventive services were discussed with this patient, including applicable screening as appropriate for cardiovascular disease, diabetes, osteopenia/osteoporosis, and glaucoma.  As appropriate for age/gender, discussed screening for colorectal cancer, prostate cancer, breast cancer, and cervical cancer. Checklist reviewing preventive services available has been given to the patient.    Reviewed patients plan of care and provided an AVS. The Basic Care Plan (routine screening as documented in Health Maintenance) for Asha meets the Care Plan requirement. This Care Plan has been established and reviewed with the Patient.    Counseling Resources:  ATP IV Guidelines  Pooled Cohorts Equation Calculator  Breast Cancer Risk Calculator  BRCA-Related Cancer Risk Assessment: FHS-7 Tool  FRAX Risk Assessment  ICSI Preventive Guidelines  Dietary Guidelines for Americans, 2010  USDA's MyPlate  ASA Prophylaxis  Lung CA Screening    Samuel Lowe MD, MD  [facility] " [facility]

## 2020-12-01 NOTE — PATIENT INSTRUCTIONS
Patient Education   Personalized Prevention Plan  You are due for the preventive services outlined below.  Your care team is available to assist you in scheduling these services.  If you have already completed any of these items, please share that information with your care team to update in your medical record.  Health Maintenance Due   Topic Date Due     Diptheria Tetanus Pertussis (DTAP/TDAP/TD) Vaccine (2 - Td) 11/20/2008     Zoster (Shingles) Vaccine (2 of 3) 12/06/2014     Diabetic Foot Exam  11/29/2019     A1C Lab  01/15/2020     Basic Metabolic Panel  07/15/2020     Cholesterol Lab  07/15/2020     Kidney Microalbumin Urine Test  07/15/2020     Eye Exam  08/21/2020     Flu Vaccine (1) 09/01/2020     FALL RISK ASSESSMENT  11/29/2020

## 2020-12-02 LAB
ALBUMIN SERPL-MCNC: 3.9 G/DL (ref 3.4–5)
ALP SERPL-CCNC: 71 U/L (ref 40–150)
ALT SERPL W P-5'-P-CCNC: 18 U/L (ref 0–70)
ANION GAP SERPL CALCULATED.3IONS-SCNC: 7 MMOL/L (ref 3–14)
AST SERPL W P-5'-P-CCNC: 22 U/L (ref 0–45)
BILIRUB SERPL-MCNC: 0.3 MG/DL (ref 0.2–1.3)
BUN SERPL-MCNC: 19 MG/DL (ref 7–30)
CALCIUM SERPL-MCNC: 9.3 MG/DL (ref 8.5–10.1)
CHLORIDE SERPL-SCNC: 105 MMOL/L (ref 94–109)
CHOLEST SERPL-MCNC: 243 MG/DL
CO2 SERPL-SCNC: 27 MMOL/L (ref 20–32)
CREAT SERPL-MCNC: 1.06 MG/DL (ref 0.66–1.25)
CREAT UR-MCNC: 100 MG/DL
GFR SERPL CREATININE-BSD FRML MDRD: 64 ML/MIN/{1.73_M2}
GLUCOSE SERPL-MCNC: 88 MG/DL (ref 70–99)
HDLC SERPL-MCNC: 54 MG/DL
LDLC SERPL CALC-MCNC: 155 MG/DL
MICROALBUMIN UR-MCNC: 8 MG/L
MICROALBUMIN/CREAT UR: 8.45 MG/G CR (ref 0–17)
NONHDLC SERPL-MCNC: 189 MG/DL
POTASSIUM SERPL-SCNC: 4 MMOL/L (ref 3.4–5.3)
PROT SERPL-MCNC: 7.6 G/DL (ref 6.8–8.8)
SODIUM SERPL-SCNC: 139 MMOL/L (ref 133–144)
TRIGL SERPL-MCNC: 171 MG/DL

## 2020-12-03 ENCOUNTER — TELEPHONE (OUTPATIENT)
Dept: FAMILY MEDICINE | Facility: CLINIC | Age: 85
End: 2020-12-03

## 2020-12-03 LAB
GAMMA INTERFERON BACKGROUND BLD IA-ACNC: 0.13 IU/ML
M TB IFN-G CD4+ BCKGRND COR BLD-ACNC: 9.87 IU/ML
M TB TUBERC IFN-G BLD QL: POSITIVE
MITOGEN IGNF BCKGRD COR BLD-ACNC: 0.84 IU/ML
MITOGEN IGNF BCKGRD COR BLD-ACNC: 0.94 IU/ML

## 2020-12-03 NOTE — TELEPHONE ENCOUNTER
Please call patient with [facility] [facility]  - his blood test for screening for tb is positive.   He had previous negative chest xray and I suspect he has latent TB but he needs repeat xray.  Please schedule him to see any provider/me in the clinic and we can obtain xray at that time and discuss rx at that time.  Rest of his labs are ok.

## 2020-12-03 NOTE — TELEPHONE ENCOUNTER
Writer called patient with [place]  #[id number] and spoke with Amie, patient's granddaughter.    Consent to communicate on file and Amie speaks English.  Phone conversation conducted in English.    Writer reviewed message per Dr. Lowe.    Amie verbalized understanding and in agreement with plan.  Follow up visit scheduled on 12/8/20 with Dr. Lowe.  Appt date, time and location confirmed with Amie.  Reviewed clinic no-visitor policy and patient to please wear mask.    Amie stated patient's  needs to arrange transportation for patient.    Routing encounter to BHAVANA Ochoa RN.    Thank you!  MALIK UngerN, RN

## 2020-12-04 ENCOUNTER — PATIENT OUTREACH (OUTPATIENT)
Dept: GERIATRIC MEDICINE | Facility: CLINIC | Age: 85
End: 2020-12-04

## 2020-12-04 NOTE — PROGRESS NOTES
[facility] Care Coordination Contact    Arranged transportation thru [facility] for the below appt:  Appt Date & Time: 12/8/20 at 4:00PM  Clinic Name & Address:  [facility]  Transportation Provider: [employer] ([phone])   time:  3:30PM    Notified Daphne of  time.    Jennifer Ennis  Care Management Specialist  [facility]  [phone]

## 2020-12-08 ENCOUNTER — OFFICE VISIT (OUTPATIENT)
Dept: FAMILY MEDICINE | Facility: CLINIC | Age: 85
End: 2020-12-08
Payer: COMMERCIAL

## 2020-12-08 ENCOUNTER — APPOINTMENT (OUTPATIENT)
Dept: INTERPRETER SERVICES | Facility: CLINIC | Age: 85
End: 2020-12-08
Payer: COMMERCIAL

## 2020-12-08 ENCOUNTER — ANCILLARY PROCEDURE (OUTPATIENT)
Dept: GENERAL RADIOLOGY | Facility: CLINIC | Age: 85
End: 2020-12-08
Attending: FAMILY MEDICINE
Payer: COMMERCIAL

## 2020-12-08 VITALS
TEMPERATURE: 98.3 F | HEART RATE: 70 BPM | OXYGEN SATURATION: 99 % | DIASTOLIC BLOOD PRESSURE: 66 MMHG | SYSTOLIC BLOOD PRESSURE: 120 MMHG

## 2020-12-08 DIAGNOSIS — Z22.7 TB LUNG, LATENT: Primary | ICD-10-CM

## 2020-12-08 DIAGNOSIS — Z22.7 TB LUNG, LATENT: ICD-10-CM

## 2020-12-08 PROCEDURE — 71046 X-RAY EXAM CHEST 2 VIEWS: CPT | Performed by: RADIOLOGY

## 2020-12-08 PROCEDURE — 99214 OFFICE O/P EST MOD 30 MIN: CPT | Performed by: FAMILY MEDICINE

## 2020-12-08 RX ORDER — ISONIAZID 300 MG/1
300 TABLET ORAL DAILY
Qty: 90 TABLET | Refills: 0 | Status: SHIPPED | OUTPATIENT
Start: 2020-12-08 | End: 2021-04-05

## 2020-12-08 NOTE — PROGRESS NOTES
86 yo here for follow up.     Per patient - he got a call from the clinic that he needs an xray.     Phone .     No history of TB. [place]     No cough, short of breath, blood in sputum.     Social History     Occupational History     Not on file   Tobacco Use     Smoking status: Never Smoker     Smokeless tobacco: Never Used   Substance and Sexual Activity     Alcohol use: No     Drug use: No     Sexual activity: Yes     Partners: Female     No Known Allergies  Patient Active Problem List   Diagnosis     Health Care Home     Dysuria     Essential and other specified forms of tremor     Abnormal involuntary movement     Lipoma     Cervical spondylosis without myelopathy     Degeneration of cervical intervertebral disc     Vitamin D deficiency     Memory loss     Obesity     Insomnia     Gastroesophageal reflux disease without esophagitis     Hyperlipidemia LDL goal <100     Pain in joint involving lower leg     Hypertension goal BP (blood pressure) < 140/80     Type 2 diabetes mellitus without complications (H)     Lumbago     Chronic pain of both knees     Headache     Nausea     UTI (urinary tract infection)     Lung nodule seen on imaging study     Bladder stones     ACP (advance care planning)     Hyponatremia     Tension headache     Encounter for diabetic foot exam (H)     Calcified granuloma of lung (H)     Reviewed medications, social history and  past medical and surgical history.    Review of system: for general, respiratory, CVS, GI and psychiatry negative except for noted above.     EXAM:  /66 (BP Location: Right arm, Patient Position: Sitting, Cuff Size: Adult Regular)   Pulse 70   Temp 98.3  F (36.8  C) (Oral)   SpO2 99%   Constitutional: healthy, alert and no distress   Psychiatric: mentation appears normal and affect normal/bright  Respiratory: negative, Lungs clear. No crackles or wheezing. No tachypnea.     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   he is really concerned about his liver.  He repeatedly states that his liver is in a great shape and he does not want any medication that can damage his liver.  I reassured him that we will continue to monitor his liver enzymes and this medication is only for 9 months.  He understood.  We will do 3 months of prescription and he should be seen every 3 months for evaluation and repeat LFT.  He also has a memory issue and it would be appropriate for us to have a relatively close follow-up.  Plan: XR Chest 2 Views, isoniazid (NYDRAZID) 300 MG         tablet             Answered all of his questions via phone .    The above note was dictated using voice recognition. Although reviewed after completion, some word and grammatical error may remain .

## 2020-12-08 NOTE — PATIENT INSTRUCTIONS
Did you know?      You can schedule a video visit for follow-up appointments as well as future appointments for certain conditions.  Please see the below link.     [email or web address]    If you have not already done so,  I encourage you to sign up for [employer] ([email or web address]).  This will allow you to review your results, securely communicate with a provider, and schedule virtual visits as well.

## 2021-01-26 ENCOUNTER — OFFICE VISIT (OUTPATIENT)
Dept: FAMILY MEDICINE | Facility: CLINIC | Age: 86
End: 2021-01-26
Payer: COMMERCIAL

## 2021-01-26 VITALS
HEART RATE: 70 BPM | TEMPERATURE: 98 F | BODY MASS INDEX: 26.98 KG/M2 | OXYGEN SATURATION: 99 % | WEIGHT: 158 LBS | DIASTOLIC BLOOD PRESSURE: 64 MMHG | HEIGHT: 64 IN | SYSTOLIC BLOOD PRESSURE: 136 MMHG | RESPIRATION RATE: 16 BRPM

## 2021-01-26 DIAGNOSIS — E11.9 TYPE 2 DIABETES MELLITUS WITHOUT COMPLICATION, WITHOUT LONG-TERM CURRENT USE OF INSULIN (H): ICD-10-CM

## 2021-01-26 PROCEDURE — 99213 OFFICE O/P EST LOW 20 MIN: CPT | Performed by: FAMILY MEDICINE

## 2021-01-26 ASSESSMENT — MIFFLIN-ST. JEOR: SCORE: 1317.68

## 2021-01-26 NOTE — PROGRESS NOTES
Assessment & Plan     Type 2 diabetes mellitus without complication, without long-term current use of insulin (H)  Diet controlled. Doing well. Ok to fill out diet - hyperlipidemia and diabetes.                                No follow-ups on file.    Samuel Lowe MD, MD  [facility] TEODORA Barry is a 86 year old who presents to clinic today for the following health issues     HPI     Patient has a special diet form to be filled out.     Here with his daughter.     No issues with medication. Diabetes - avoids unhealthy food.          Review of Systems         Objective    There were no vitals taken for this visit.  There is no height or weight on file to calculate BMI.  Physical Exam

## 2021-01-26 NOTE — PATIENT INSTRUCTIONS
We are working hard to begin vaccinating more people against COVID-19. Currently, we are only vaccinating Phase 1a workers - healthcare workers who are unable to do their job remotely. Vaccine availability is very limited.      If you are a healthcare worker and you are unable to do your job remotely, please log in to [employer] using this link to see if we have openings and schedule an appointment. At your vaccine appointment, you will be asked to provide proof of employment as a health care worker. If you cannot, you will be turned away.     Vaccine appointments are being added as they become available. Please check your [employer] account frequently for availability.  If you have technical difficulty using [employer], call [phone] for assistance.     You can learn more about the state's phased approach to administering the vaccine, with details on each phase, here.      Phase 1b is the next group that will get vaccinated and includes frontline essential workers and adults 75 years of age and older. When we are able to start vaccinating this group, we will share that information on our website. Check this website to stay up to date on COVID-19 vaccination information.        Did you know?      You can schedule a video visit for follow-up appointments as well as future appointments for certain conditions.  Please see the below link.     [email or web address]    If you have not already done so,  I encourage you to sign up for [employer] ([email or web address]).  This will allow you to review your results, securely communicate with a provider, and schedule virtual visits as well.

## 2021-01-29 ENCOUNTER — PATIENT OUTREACH (OUTPATIENT)
Dept: GERIATRIC MEDICINE | Facility: CLINIC | Age: 86
End: 2021-01-29

## 2021-01-29 NOTE — PROGRESS NOTES
[facility] Care Coordination Contact      [facility] Six-Month Telephone Assessment    6 month telephone assessment completed on 01/29/2021.    ER visits: No  Hospitalizations: No  TCU stays: No  Significant health status changes: n/a  Falls/Injuries: No  ADL/IADL changes: No  Changes in services: No    Caregiver Assessment follow up:  n/a    Goals: See POC in chart for goal progress documentation.      Will see member in 6 months for an annual health risk assessment.   Encouraged member to call CC with any questions or concerns in the meantime.     Daphne Ochoa RN BSN PHN      [facility]  Care Coordinator  Phone:   [phone]  Fax:       [phone]

## 2021-02-03 DIAGNOSIS — H92.03 OTALGIA, BILATERAL: ICD-10-CM

## 2021-02-04 NOTE — TELEPHONE ENCOUNTER
Routing refill request to provider for review/approval because:  Drug not on the [facility] refill protocol       Last Written Prescription Date:  12/1/2020  Last Fill Quantity: 15 mL,  # refills: 0   Last office visit: 1/26/2021 with prescribing provider:     Future Office Visit:

## 2021-03-27 ENCOUNTER — TELEPHONE (OUTPATIENT)
Dept: FAMILY MEDICINE | Facility: CLINIC | Age: 86
End: 2021-03-27

## 2021-03-27 DIAGNOSIS — Z22.7 TB LUNG, LATENT: ICD-10-CM

## 2021-03-27 NOTE — TELEPHONE ENCOUNTER
isoniazid (NYDRAZID) 300 MG tablet      Last Written Prescription Date:  12/8/2020  Last Fill Quantity: 90 tabs,   # refills: 0  Last Office Visit: 1/26/2021  Future Office visit:       Routing refill request to provider for review/approval because:  Drug not on the [facility], [facility] or [facility] refill protocol or controlled substance

## 2021-03-29 NOTE — TELEPHONE ENCOUNTER
isoniazid (NYDRAZID) 300 MG tablet 90 tablet 0 12/8/2020 3/8/2021 --   Sig - Route: Take 1 tablet (300 mg) by mouth daily - Oral   Sent to pharmacy as: Isoniazid 300 MG Oral Tablet (NYDRAZID)   Class: E-Prescribe   Order: [id number]   E-Prescribing Status: Receipt confirmed by pharmacy (12/8/2020  4:15 PM CST)   Printout Tracking    External Result Report   Pharmacy    [facility] - [place], [place] - [address]   Associated Diagnoses    TB lung, latent [Z22.7]  - Primary        Is he needing further treatment? Kika Partida RN

## 2021-04-05 RX ORDER — ISONIAZID 300 MG/1
300 TABLET ORAL DAILY
Qty: 90 TABLET | Refills: 0 | Status: SHIPPED | OUTPATIENT
Start: 2021-04-05 | End: 2021-06-28

## 2021-04-05 NOTE — TELEPHONE ENCOUNTER
Called pt.  He handed the phone to a daughter.    Reviewed this message with her. She explained to pt.  Will  the rx.  Made appt for f/u for 4/20/21.  LINN Sanchez

## 2021-04-05 NOTE — TELEPHONE ENCOUNTER
Yes, it is for total of 9 months. He completed 3 months. I signed another 3 months. Needs every 3 months follow up. End should be around Sept 21.  Please review previous notes for the plan.   New rx signed. He needs to come back for repeat labs and follow up.

## 2021-04-20 ENCOUNTER — OFFICE VISIT (OUTPATIENT)
Dept: FAMILY MEDICINE | Facility: CLINIC | Age: 86
End: 2021-04-20
Payer: COMMERCIAL

## 2021-04-20 VITALS
OXYGEN SATURATION: 99 % | SYSTOLIC BLOOD PRESSURE: 104 MMHG | HEART RATE: 58 BPM | WEIGHT: 154 LBS | HEIGHT: 64 IN | DIASTOLIC BLOOD PRESSURE: 70 MMHG | BODY MASS INDEX: 26.29 KG/M2 | TEMPERATURE: 97.8 F | RESPIRATION RATE: 16 BRPM

## 2021-04-20 DIAGNOSIS — E11.9 TYPE 2 DIABETES MELLITUS WITHOUT COMPLICATION, WITHOUT LONG-TERM CURRENT USE OF INSULIN (H): ICD-10-CM

## 2021-04-20 DIAGNOSIS — Z22.7 TB LUNG, LATENT: Primary | ICD-10-CM

## 2021-04-20 LAB
ALBUMIN SERPL-MCNC: 3.8 G/DL (ref 3.4–5)
ALP SERPL-CCNC: 63 U/L (ref 40–150)
ALT SERPL W P-5'-P-CCNC: 17 U/L (ref 0–70)
ANION GAP SERPL CALCULATED.3IONS-SCNC: 3 MMOL/L (ref 3–14)
AST SERPL W P-5'-P-CCNC: 12 U/L (ref 0–45)
BILIRUB SERPL-MCNC: 0.5 MG/DL (ref 0.2–1.3)
BUN SERPL-MCNC: 13 MG/DL (ref 7–30)
CALCIUM SERPL-MCNC: 9.2 MG/DL (ref 8.5–10.1)
CHLORIDE SERPL-SCNC: 104 MMOL/L (ref 94–109)
CO2 SERPL-SCNC: 32 MMOL/L (ref 20–32)
CREAT SERPL-MCNC: 0.9 MG/DL (ref 0.66–1.25)
GFR SERPL CREATININE-BSD FRML MDRD: 77 ML/MIN/{1.73_M2}
GLUCOSE SERPL-MCNC: 89 MG/DL (ref 70–99)
HBA1C MFR BLD: 5.7 % (ref 0–5.6)
POTASSIUM SERPL-SCNC: 4.2 MMOL/L (ref 3.4–5.3)
PROT SERPL-MCNC: 7.5 G/DL (ref 6.8–8.8)
SODIUM SERPL-SCNC: 139 MMOL/L (ref 133–144)

## 2021-04-20 PROCEDURE — 83036 HEMOGLOBIN GLYCOSYLATED A1C: CPT | Performed by: FAMILY MEDICINE

## 2021-04-20 PROCEDURE — 99214 OFFICE O/P EST MOD 30 MIN: CPT | Performed by: FAMILY MEDICINE

## 2021-04-20 PROCEDURE — 36415 COLL VENOUS BLD VENIPUNCTURE: CPT | Performed by: FAMILY MEDICINE

## 2021-04-20 PROCEDURE — 80053 COMPREHEN METABOLIC PANEL: CPT | Performed by: FAMILY MEDICINE

## 2021-04-20 ASSESSMENT — MIFFLIN-ST. JEOR: SCORE: 1299.54

## 2021-04-20 NOTE — PROGRESS NOTES
Assessment & Plan     TB lung, latent  Continue INH. Denies any concerns. Will complete INH in sep 21  - Comprehensive metabolic panel (BMP + Alb, Alk Phos, ALT, AST, Total. Bili, TP)    Type 2 diabetes mellitus without complication, without long-term current use of insulin (H)  Doing well. Diet controlled.   - Hemoglobin A1c  - Comprehensive metabolic panel (BMP + Alb, Alk Phos, ALT, AST, Total. Bili, TP)    Follow up in 3 months. Repeat LFTs every 3 months     Samuel Lowe MD, MD  [facility]    Terri Barry is a 86 year old who presents for the following health issues     HPI       Diabetes Follow-up    How often are you checking your blood sugar? One time daily  What time of day are you checking your blood sugars (select all that apply)?  Before meals  Have you had any blood sugars above 200?  No  Have you had any blood sugars below 70?  No    What symptoms do you notice when your blood sugar is low?  None    What concerns do you have today about your diabetes? None     Do you have any of these symptoms? (Select all that apply)  No numbness or tingling in feet.  No redness, sores or blisters on feet.  No complaints of excessive thirst.  No reports of blurry vision.  No significant changes to weight.    Have you had a diabetic eye exam in the last 12 months? No    Hyperlipidemia Follow-Up      Are you regularly taking any medication or supplement to lower your cholesterol?   Yes- atorvastatin 20mg    Are you having muscle aches or other side effects that you think could be caused by your cholesterol lowering medication?  No    Hypertension Follow-up      Do you check your blood pressure regularly outside of the clinic? Yes     Are you following a low salt diet? Yes    Are your blood pressures ever more than 140 on the top number (systolic) OR more   than 90 on the bottom number (diastolic), for example 140/90? No    BP Readings from Last 2 Encounters:   04/20/21  "104/70   01/26/21 136/64     Hemoglobin A1C (%)   Date Value   04/20/2021 5.7 (H)   12/01/2020 5.4     LDL Cholesterol Calculated (mg/dL)   Date Value   12/01/2020 155 (H)   07/15/2019 122 (H)         How many servings of fruits and vegetables do you eat daily?  4 or more    On average, how many sweetened beverages do you drink each day (Examples: soda, juice, sweet tea, etc.  Do NOT count diet or artificially sweetened beverages)?   Once a month    How many days per week do you exercise enough to make your heart beat faster? 7    How many minutes a day do you exercise enough to make your heart beat faster? 30 - 60    How many days per week do you miss taking your medication? 0    Patient wanted daughter to  for him.     Review of Systems         Objective    /70 (BP Location: Left arm, Patient Position: Sitting, Cuff Size: Adult Regular)   Pulse 58   Temp 97.8  F (36.6  C) (Oral)   Resp 16   Ht 1.626 m (5' 4\")   Wt 69.9 kg (154 lb)   SpO2 99%   BMI 26.43 kg/m    Body mass index is 26.43 kg/m .  Physical Exam                     "

## 2021-04-20 NOTE — LETTER
April 21, 2021      Asha Sanford  [address]  [address]  [place] [address]        Dear ,    We are writing to inform you of your test results.    Your kidney and liver function tests are fine.  Follow-up in 3 months    Resulted Orders   Hemoglobin A1c   Result Value Ref Range    Hemoglobin A1C 5.7 (H) 0 - 5.6 %      Comment:      Normal <5.7% Prediabetes 5.7-6.4%  Diabetes 6.5% or higher - adopted from ADA   consensus guidelines.     Comprehensive metabolic panel (BMP + Alb, Alk Phos, ALT, AST, Total. Bili, TP)   Result Value Ref Range    Sodium 139 133 - 144 mmol/L    Potassium 4.2 3.4 - 5.3 mmol/L    Chloride 104 94 - 109 mmol/L    Carbon Dioxide 32 20 - 32 mmol/L    Anion Gap 3 3 - 14 mmol/L    Glucose 89 70 - 99 mg/dL    Urea Nitrogen 13 7 - 30 mg/dL    Creatinine 0.90 0.66 - 1.25 mg/dL    GFR Estimate 77 >60 mL/min/[1.73_m2]      Comment:      Non  GFR Calc  Starting 12/18/2018, serum creatinine based estimated GFR (eGFR) will be   calculated using the Chronic Kidney Disease Epidemiology Collaboration   (CKD-EPI) equation.      GFR Estimate If Black 90 >60 mL/min/[1.73_m2]      Comment:       GFR Calc  Starting 12/18/2018, serum creatinine based estimated GFR (eGFR) will be   calculated using the Chronic Kidney Disease Epidemiology Collaboration   (CKD-EPI) equation.      Calcium 9.2 8.5 - 10.1 mg/dL    Bilirubin Total 0.5 0.2 - 1.3 mg/dL    Albumin 3.8 3.4 - 5.0 g/dL    Protein Total 7.5 6.8 - 8.8 g/dL    Alkaline Phosphatase 63 40 - 150 U/L    ALT 17 0 - 70 U/L    AST 12 0 - 45 U/L       If you have any questions or concerns, please call the clinic at the number listed above.       Sincerely,      Samuel Lowe MD/nr

## 2021-05-25 ENCOUNTER — PATIENT OUTREACH (OUTPATIENT)
Dept: GERIATRIC MEDICINE | Facility: CLINIC | Age: 86
End: 2021-05-25

## 2021-05-25 NOTE — PROGRESS NOTES
[facility] Care Coordination Contact    Called member to schedule annual HRA home visit. Unable to leave VM as message system is not set up on member's phone.  Placed a call to EC Amie tripathi and left  requesting a call back.  Daphne Ochoa RN BSN PHN      [facility]  Care Coordinator  Phone:   [phone]  Fax:       [phone]

## 2021-06-03 ENCOUNTER — PATIENT OUTREACH (OUTPATIENT)
Dept: GERIATRIC MEDICINE | Facility: CLINIC | Age: 86
End: 2021-06-03

## 2021-06-03 DIAGNOSIS — R30.0 DYSURIA: ICD-10-CM

## 2021-06-03 DIAGNOSIS — R39.11 HESITANCY OF MICTURITION: ICD-10-CM

## 2021-06-03 SDOH — SOCIAL STABILITY: SOCIAL NETWORK: HOW OFTEN DO YOU GET TOGETHER WITH FRIENDS OR RELATIVES?: MORE THAN THREE TIMES A WEEK

## 2021-06-03 SDOH — HEALTH STABILITY: PHYSICAL HEALTH: ON AVERAGE, HOW MANY MINUTES DO YOU ENGAGE IN EXERCISE AT THIS LEVEL?: 0 MIN

## 2021-06-03 SDOH — SOCIAL STABILITY: SOCIAL INSECURITY: WITHIN THE LAST YEAR, HAVE YOU BEEN AFRAID OF YOUR PARTNER OR EX-PARTNER?: NO

## 2021-06-03 SDOH — SOCIAL STABILITY: SOCIAL NETWORK
IN A TYPICAL WEEK, HOW MANY TIMES DO YOU TALK ON THE PHONE WITH FAMILY, FRIENDS, OR NEIGHBORS?: MORE THAN THREE TIMES A WEEK

## 2021-06-03 SDOH — HEALTH STABILITY: MENTAL HEALTH
STRESS IS WHEN SOMEONE FEELS TENSE, NERVOUS, ANXIOUS, OR CAN'T SLEEP AT NIGHT BECAUSE THEIR MIND IS TROUBLED. HOW STRESSED ARE YOU?: TO SOME EXTENT

## 2021-06-03 SDOH — HEALTH STABILITY: PHYSICAL HEALTH: ON AVERAGE, HOW MANY DAYS PER WEEK DO YOU ENGAGE IN MODERATE TO STRENUOUS EXERCISE (LIKE A BRISK WALK)?: 0 DAYS

## 2021-06-03 SDOH — SOCIAL STABILITY: SOCIAL NETWORK
DO YOU BELONG TO ANY CLUBS OR ORGANIZATIONS SUCH AS CHURCH GROUPS UNIONS, FRATERNAL OR ATHLETIC GROUPS, OR SCHOOL GROUPS?: YES

## 2021-06-03 SDOH — SOCIAL STABILITY: SOCIAL NETWORK: HOW OFTEN DO YOU ATTEND CHURCH OR RELIGIOUS SERVICES?: NEVER

## 2021-06-03 SDOH — SOCIAL STABILITY: SOCIAL INSECURITY
WITHIN THE LAST YEAR, HAVE TO BEEN RAPED OR FORCED TO HAVE ANY KIND OF SEXUAL ACTIVITY BY YOUR PARTNER OR EX-PARTNER?: NO

## 2021-06-03 SDOH — SOCIAL STABILITY: SOCIAL INSECURITY
WITHIN THE LAST YEAR, HAVE YOU BEEN KICKED, HIT, SLAPPED, OR OTHERWISE PHYSICALLY HURT BY YOUR PARTNER OR EX-PARTNER?: NO

## 2021-06-03 SDOH — SOCIAL STABILITY: SOCIAL INSECURITY: WITHIN THE LAST YEAR, HAVE YOU BEEN HUMILIATED OR EMOTIONALLY ABUSED IN OTHER WAYS BY YOUR PARTNER OR EX-PARTNER?: NO

## 2021-06-03 SDOH — SOCIAL STABILITY: SOCIAL NETWORK: HOW OFTEN DO YOU ATTENT MEETINGS OF THE CLUB OR ORGANIZATION YOU BELONG TO?: 1 TO 4 TIMES PER YEAR

## 2021-06-03 ASSESSMENT — PATIENT HEALTH QUESTIONNAIRE - PHQ9: SUM OF ALL RESPONSES TO PHQ QUESTIONS 1-9: 7

## 2021-06-03 ASSESSMENT — ACTIVITIES OF DAILY LIVING (ADL): DEPENDENT_IADLS:: CLEANING;COOKING;LAUNDRY;SHOPPING;MEAL PREPARATION;MEDICATION MANAGEMENT;TRANSPORTATION

## 2021-06-03 NOTE — PROGRESS NOTES
[facility] Care Coordination Contact    [facility] Home Visit Assessment     Telephonic visit for Health Risk Assessment with Asha Sanford completed on Annette 3, 2021    Type of residence:: Apartment  Current living arrangement:: I live alone     Assessment completed with:: Patient    Current Care Plan  Member currently receiving the following home care services:   N/A  Member currently receiving the following community resources: PCA, homemaking, adult day care, adult day care transportation and monthly supply and incontinent supply.    Medication Review  Medication reconciliation completed in Epic: Yes  Medication set-up & administration: Family/informal caregiver sets up daily.  Family caregiver administers medications.  Medication Risk Assessment Medication (1 or more, place referral to MTM): Taking 1 or more high-risk medications for adults >65 years  MTM Referral Placed: No: Declines at this time.    Mental/Behavioral Health   Depression Screening:      PHQ-9 Total Score: 7    Mental health DX:: Yes(Insomnia)        ADL/IADL Dependencies:   Dependent ADLs:: Ambulation-walker, Bathing, Dressing, Grooming, Transfers  Dependent IADLs:: Cleaning, Cooking, Laundry, Shopping, Meal Preparation, Medication Management, Transportation    [facility] sponsored benefits: Shared information re: Silver Sneakers/gym memberships, ASA, Calcium +D.    PCA Assessment completed at visit: Yes Annual PCA assessment indicated 14 units per day of PCA. This is the same as the previous assessment.     Elderly Waiver Eligibility: Yes-will continue on EW    Care Plan & Recommendations: Member to continue current POC including PCA, homemaking, adult day care, adult day care transportation and monthly supply and incontinent supply.    See CC for detailed assessment information.    Follow-Up Plan: Member informed of future contact, plan to f/u with member with a 6 month telephone assessment.  Contact information  shared with member and family, encouraged member to call with any questions or concerns at any time.    [facility] continuum providers: Please refer to Health Care Home on the Epic Problem List to view this patient's [facility] Care Plan Summary.    Daphne Ochoa RN BSN PHN      [facility]  Care Coordinator  Phone:   [phone]  Fax:       [phone]

## 2021-06-07 RX ORDER — FINASTERIDE 5 MG/1
5 TABLET, FILM COATED ORAL DAILY
Qty: 90 TABLET | Refills: 1 | Status: SHIPPED | OUTPATIENT
Start: 2021-06-07 | End: 2021-06-23

## 2021-06-07 RX ORDER — FINASTERIDE 5 MG/1
5 TABLET, FILM COATED ORAL DAILY
Qty: 90 TABLET | Refills: 1 | Status: SHIPPED | OUTPATIENT
Start: 2021-06-07 | End: 2021-06-07

## 2021-06-07 RX ORDER — TAMSULOSIN HYDROCHLORIDE 0.4 MG/1
CAPSULE ORAL
Qty: 90 CAPSULE | Refills: 1 | Status: SHIPPED | OUTPATIENT
Start: 2021-06-07 | End: 2021-06-07

## 2021-06-07 RX ORDER — TAMSULOSIN HYDROCHLORIDE 0.4 MG/1
CAPSULE ORAL
Qty: 90 CAPSULE | Refills: 1 | Status: SHIPPED | OUTPATIENT
Start: 2021-06-07 | End: 2021-06-23

## 2021-06-07 NOTE — TELEPHONE ENCOUNTER
Was given 90 tabs with 11 refills of both proscar and flomax in oct 2019 & those which should still be available at his pharmacy

## 2021-06-10 ENCOUNTER — PATIENT OUTREACH (OUTPATIENT)
Dept: GERIATRIC MEDICINE | Facility: CLINIC | Age: 86
End: 2021-06-10

## 2021-06-10 NOTE — PROGRESS NOTES
[facility] Care Coordination Contact    Received after visit chart from care coordinator.  Completed following tasks: Mailed copy of care plan to client, Updated services in access and Submitted referrals/auths for ADC and transportation   , Provider Signature - No POC Shared:  Member indicates that they do not want their POC shared with any EW providers.    [facility]:  Emailed completed PCA assessment to [facility].  Faxed copy of PCA assessment to PCA Agency and mailed copy to member.  Faxed MD Communication to PCP.     **THIS ASSESSMENT WAS DONE OVER THE PHONE. SENT POC/PCA SIG PAGES TO MEMBER ASKING THEM TO SIGN AND RETURN IN SASE**    Jennifer Ennis  Care Management Specialist  [facility]  [phone]

## 2021-06-10 NOTE — TELEPHONE ENCOUNTER
THese meds were sent to [facility]    This encounter will be closed    Augustina Roger RN

## 2021-06-23 RX ORDER — FINASTERIDE 5 MG/1
5 TABLET, FILM COATED ORAL DAILY
Qty: 90 TABLET | Refills: 1 | Status: SHIPPED | OUTPATIENT
Start: 2021-06-23 | End: 2021-12-03

## 2021-06-23 RX ORDER — TAMSULOSIN HYDROCHLORIDE 0.4 MG/1
CAPSULE ORAL
Qty: 90 CAPSULE | Refills: 1 | Status: SHIPPED | OUTPATIENT
Start: 2021-06-23 | End: 2022-03-14

## 2021-06-23 NOTE — TELEPHONE ENCOUNTER
"I shredded both rx, because Marisol said \"NO need to fax the local rx\".     Mary Goodman on 6/23/2021 at 3:11 PM  "

## 2021-06-23 NOTE — TELEPHONE ENCOUNTER
6/7/21- both meds were local printed.  I will resend the rx to both to directed phramacy.    I pended these meds.  Only time these meds were signed by a provider was Dr. Moser in 2019/  Will send to Dr. Lowe.  Please sign.  LINN Sanchez

## 2021-06-23 NOTE — TELEPHONE ENCOUNTER
Since this is the 3rd attempt to get these rx to the [facility] pharmacy. I called both meds into the pharmacy.    NO need to fax the local rx.  LINN Sanchez

## 2021-06-23 NOTE — PROGRESS NOTES
[facility] Care Coordination Contact    Member called and stated he went to pharmacy but there was no prescription for him to .  Placed a call to pharmacy who stated they are unable to see rx for 06/07/2021 and are requesting for provider to resend prescription again as they has problems with them receiving the prescriptions.    Please resend both prescriptions for FLOMAX and PROSCAR to [facility] at [address] [place] [address].      Thank you,    Daphne Ochoa, RN BSN PHN      [facility]  Care Coordinator  Phone:   [phone]  Fax:       [phone]

## 2021-06-26 DIAGNOSIS — Z22.7 TB LUNG, LATENT: ICD-10-CM

## 2021-06-28 RX ORDER — ISONIAZID 300 MG/1
TABLET ORAL
Qty: 90 TABLET | Refills: 0 | Status: SHIPPED | OUTPATIENT
Start: 2021-06-28 | End: 2021-07-21

## 2021-06-28 NOTE — TELEPHONE ENCOUNTER
Isoniazid    Last ordered: 2 months ago by Samuel Lowe MD Last refill: 4/5/2021     Has appt 7/7/21 with Dr Lowe for TB f/u      Refill request to MD/primary care provider.  Augustina Roger RN

## 2021-07-21 ENCOUNTER — OFFICE VISIT (OUTPATIENT)
Dept: FAMILY MEDICINE | Facility: CLINIC | Age: 86
End: 2021-07-21
Payer: COMMERCIAL

## 2021-07-21 VITALS
OXYGEN SATURATION: 97 % | WEIGHT: 152 LBS | HEIGHT: 63 IN | DIASTOLIC BLOOD PRESSURE: 65 MMHG | BODY MASS INDEX: 26.93 KG/M2 | TEMPERATURE: 97 F | RESPIRATION RATE: 16 BRPM | SYSTOLIC BLOOD PRESSURE: 155 MMHG | HEART RATE: 60 BPM

## 2021-07-21 DIAGNOSIS — E11.9 TYPE 2 DIABETES MELLITUS WITHOUT COMPLICATION, WITHOUT LONG-TERM CURRENT USE OF INSULIN (H): Primary | Chronic | ICD-10-CM

## 2021-07-21 DIAGNOSIS — Z22.7 TB LUNG, LATENT: ICD-10-CM

## 2021-07-21 DIAGNOSIS — H72.92 PERFORATED EARDRUM, LEFT: ICD-10-CM

## 2021-07-21 PROCEDURE — 99214 OFFICE O/P EST MOD 30 MIN: CPT | Performed by: FAMILY MEDICINE

## 2021-07-21 RX ORDER — NEOMYCIN SULFATE, POLYMYXIN B SULFATE AND HYDROCORTISONE 10; 3.5; 1 MG/ML; MG/ML; [USP'U]/ML
4 SUSPENSION/ DROPS AURICULAR (OTIC) 4 TIMES DAILY
Qty: 10 ML | Refills: 0 | Status: SHIPPED | OUTPATIENT
Start: 2021-07-21 | End: 2022-11-29

## 2021-07-21 RX ORDER — ISONIAZID 300 MG/1
300 TABLET ORAL DAILY
Qty: 90 TABLET | Refills: 0 | Status: SHIPPED | OUTPATIENT
Start: 2021-07-21 | End: 2021-12-03

## 2021-07-21 ASSESSMENT — MIFFLIN-ST. JEOR: SCORE: 1274.6

## 2021-07-21 NOTE — PATIENT INSTRUCTIONS
Did you know?      You can schedule a video visit for follow-up appointments as well as future appointments for certain conditions.  Please see the below link.     [email or web address]    If you have not already done so,  I encourage you to sign up for Vipshopt ([email or web address]).  This will allow you to review your results, securely communicate with a provider, and schedule virtual visits as well.

## 2021-07-21 NOTE — PROGRESS NOTES
"    Assessment & Plan     Type 2 diabetes mellitus without complication, without long-term current use of insulin (H)  Overall doing well.  Continue same Rx.    TB lung, latent  Ideally he should be done with a 9-month of Rx but family and patient cannot confirm that he has started taking it when it was first prescribed.  We will do another 3 months of prescription and then will stop it.  - isoniazid (NYDRAZID) 300 MG tablet; Take 1 tablet (300 mg) by mouth daily    Perforated eardrum, left  He is noticing some ear pain.  Using Q-tips on regularly.  Recommended to avoid Q-tips.  We discussed perforated eardrums may heal.  I offered ENT referral.  He would like to wait.  We will recheck his ear next time.  - neomycin-polymyxin-hydrocortisone (CORTISPORIN) 3.5-90091-5 otic suspension; Place 4 drops Into the left ear 4 times daily             BMI:   Estimated body mass index is 26.93 kg/m  as calculated from the following:    Height as of this encounter: 1.6 m (5' 3\").    Weight as of this encounter: 68.9 kg (152 lb).           Return in about 3 months (around 10/21/2021).    Samuel Lowe MD, MD  [facility]    Terri Barry is a 86 year old who presents for the following health issues     HPI       TB follow up, and has ear pain     No cough or short of breath.     Left ear >right - hurts   Uses qtips daily.     Blood sugars - doing well.          Review of Systems         Objective    BP (!) 150/84 (BP Location: Right arm, Patient Position: Chair, Cuff Size: Adult Regular)   Pulse 57   Temp 97  F (36.1  C) (Tympanic)   Resp 16   Ht 1.6 m (5' 3\")   Wt 68.9 kg (152 lb)   SpO2 97%   BMI 26.93 kg/m    Body mass index is 26.93 kg/m .  Physical Exam   Perforated left tympanic membrane.  Right ear canal and tympanic membrane are within normal limit.                  "

## 2021-07-23 DIAGNOSIS — I10 ESSENTIAL HYPERTENSION, BENIGN: ICD-10-CM

## 2021-07-23 RX ORDER — METOPROLOL SUCCINATE 50 MG/1
50 TABLET, EXTENDED RELEASE ORAL DAILY
Qty: 30 TABLET | Refills: 0 | Status: SHIPPED | OUTPATIENT
Start: 2021-07-23 | End: 2021-07-23

## 2021-07-23 NOTE — TELEPHONE ENCOUNTER
DR Lowe,    Pt out of metoprolol today per granddaughter  Refilled for 30 days only.     OK to fill for more?    BP Readings from Last 3 Encounters:   07/21/21 (!) 155/65   04/20/21 104/70   01/26/21 136/64     Augustina Roger, RN, BSN  [facility]

## 2021-07-24 RX ORDER — METOPROLOL SUCCINATE 50 MG/1
50 TABLET, EXTENDED RELEASE ORAL DAILY
Qty: 90 TABLET | Refills: 1 | Status: SHIPPED | OUTPATIENT
Start: 2021-07-24 | End: 2022-03-14

## 2021-07-27 RX ORDER — METOPROLOL SUCCINATE 50 MG/1
TABLET, EXTENDED RELEASE ORAL
Qty: 90 TABLET | OUTPATIENT
Start: 2021-07-27

## 2021-07-28 ENCOUNTER — NURSE TRIAGE (OUTPATIENT)
Dept: NURSING | Facility: CLINIC | Age: 86
End: 2021-07-28

## 2021-07-28 NOTE — TELEPHONE ENCOUNTER
Asha is calling and states that for the past three days nonstop has a cough and it is day and night with a fever.  The night time is the worse.  Denies shortness of breath.  Denies chest pain.  Patient has been vaccinated.  Patient denies wheezing.  Patient states that he will go to ED.    COVID 19 Nurse Triage Plan/Patient Instructions    Please be aware that novel coronavirus (COVID-19) may be circulating in the community. If you develop symptoms such as fever, cough, or SOB or if you have concerns about the presence of another infection including coronavirus (COVID-19), please contact your health care provider or visit [email or web address].     Disposition/Instructions    Virtual Visit with provider recommended. Reference Visit Selection Guide.    Thank you for taking steps to prevent the spread of this virus.  o Limit your contact with others.  o Wear a simple mask to cover your cough.  o Wash your hands well and often.    Resources    [facility]: About COVID-19: [email or web address]    CDC: What to Do If You're Sick: www.cdc.gov/coronavirus/2019-ncov/about/steps-when-sick.html    CDC: Ending Home Isolation: www.cdc.gov/coronavirus/2019-ncov/hcp/disposition-in-home-patients.html     CDC: Caring for Someone: www.cdc.gov/coronavirus/2019-ncov/if-you-are-sick/care-for-someone.html     [facility]: Interim Guidance for Hospital Discharge to Home: www.health.[facility].[place].us/diseases/coronavirus/hcp/hospdischarge.pdf    [facility] clinical trials (COVID-19 research studies): clinicalaffairs.[facility]/umn-clinical-trials     Below are the COVID-19 hotlines at the [facility] ([facility]). Interpreters are available.   o For health questions: Call 344-469-2533 or 1-117.160.2713 (7 a.m. to 7 p.m.)  o For questions about schools and childcare: Call 860-331-5475 or 1-148.278.1647 (7 a.m. to 7 p.m.)                       Reason for Disposition    Continuous (nonstop) coughing  interferes with work or school and no improvement using cough treatment per Care Advice    Additional Information    Negative: Bluish (or gray) lips or face    Negative: Severe difficulty breathing (e.g., struggling for each breath, speaks in single words)    Negative: Rapid onset of cough and has hives    Negative: Coughing started suddenly after medicine, an allergic food or bee sting    Negative: Difficulty breathing after exposure to flames, smoke, or fumes    Negative: Sounds like a life-threatening emergency to the triager    Negative: Chest pain present when not coughing    Negative: Difficulty breathing    Negative: Passed out (i.e., fainted, collapsed and was not responding)    Negative: Patient sounds very sick or weak to the triager    Negative: Coughed up > 1 tablespoon (15 ml) blood (Exception: blood-tinged sputum)    Negative: Fever > 103 F (39.4 C)    Negative: Fever > 101 F (38.3 C) and over 60 years of age    Negative: Fever > 100.0 F (37.8 C) and has diabetes mellitus or a weak immune system (e.g., HIV positive, cancer chemotherapy, organ transplant, splenectomy, chronic steroids)    Negative: Fever > 100.0 F (37.8 C) and bedridden (e.g., nursing home patient, stroke, chronic illness, recovering from surgery)    Negative: Increasing ankle swelling    Negative: Wheezing is present    Negative: SEVERE coughing spells (e.g., whooping sound after coughing, vomiting after coughing)    Negative: Coughing up chong-colored (reddish-brown) or blood-tinged sputum    Negative: Fever present > 3 days (72 hours)    Negative: Fever returns after gone for over 24 hours and symptoms worse or not improved    Negative: Using nasal washes and pain medicine > 24 hours and sinus pain persists    Negative: Known COPD or other severe lung disease (i.e., bronchiectasis, cystic fibrosis, lung surgery) and worsening symptoms (i.e., increased sputum purulence or amount, increased breathing difficulty)    Protocols used:  COUGH-A-OH

## 2021-11-30 ENCOUNTER — PATIENT OUTREACH (OUTPATIENT)
Dept: GERIATRIC MEDICINE | Facility: CLINIC | Age: 86
End: 2021-11-30
Payer: COMMERCIAL

## 2021-11-30 NOTE — PROGRESS NOTES
[facility] Care Coordination Contact      [facility] Six-Month Telephone Assessment    6 month telephone assessment completed on 11/30/2021.    ER visits: No  Hospitalizations: No  TCU stays: No  Significant health status changes: N/A  Falls/Injuries: No  ADL/IADL changes: No  Changes in services: No    Caregiver Assessment follow up:  N/A    Goals: See POC in chart for goal progress documentation.      Will see member in 6 months for an annual health risk assessment.   Encouraged member to call CC with any questions or concerns in the meantime.     Daphne Ochoa RN BSN PHN      [facility]  Care Coordinator  Phone:   [phone]  Fax:       [phone]

## 2021-12-03 ENCOUNTER — VIRTUAL VISIT (OUTPATIENT)
Dept: FAMILY MEDICINE | Facility: CLINIC | Age: 86
End: 2021-12-03
Payer: COMMERCIAL

## 2021-12-03 DIAGNOSIS — E11.9 TYPE 2 DIABETES MELLITUS WITHOUT COMPLICATION, WITHOUT LONG-TERM CURRENT USE OF INSULIN (H): Primary | ICD-10-CM

## 2021-12-03 DIAGNOSIS — R39.11 HESITANCY OF MICTURITION: ICD-10-CM

## 2021-12-03 DIAGNOSIS — Z22.7 TB LUNG, LATENT: ICD-10-CM

## 2021-12-03 PROCEDURE — 99442 PR PHYSICIAN TELEPHONE EVALUATION 11-20 MIN: CPT | Mod: 95 | Performed by: FAMILY MEDICINE

## 2021-12-03 RX ORDER — FINASTERIDE 5 MG/1
5 TABLET, FILM COATED ORAL DAILY
Qty: 90 TABLET | Refills: 1 | Status: SHIPPED | OUTPATIENT
Start: 2021-12-03 | End: 2022-04-27

## 2021-12-03 NOTE — PROGRESS NOTES
"Asha is a 86 year old who is being evaluated via a billable telephone visit.      What phone number would you like to be contacted at? [phone] --[phone] pt phone number  How would you like to obtain your AVS? MyChart    Assessment & Plan     Hesitancy of micturition  Patient is tolerating current medication without any major side effects of concerns and current dose seems reasonable too.  Current medication regime is effective. Continue current treatment without any changes.   - finasteride (PROSCAR) 5 MG tablet; Take 1 tablet (5 mg) by mouth daily    Type 2 diabetes mellitus without complication, without long-term current use of insulin (H)  Checking blood sugars.     TB lung, latent  He states he has completed rx. No need to do further rx. He is not very clear with his current meds and I recommended him to bring all of his meds next time during his appt.     Daughter also helped him to schedule his follow up appt.                BMI:   Estimated body mass index is 26.93 kg/m  as calculated from the following:    Height as of 7/21/21: 1.6 m (5' 3\").    Weight as of 7/21/21: 68.9 kg (152 lb).           No follow-ups on file.    Samuel Lowe MD, MD  [facility]    Subjective   Asha is a 86 year old who presents for the following health issues     HPI     Diabetes Follow-up    How often are you checking your blood sugar? Two times daily  Blood sugar testing frequency justification:  Patient modifying lifestyle changes (diet, exercise) with blood sugars  What time of day are you checking your blood sugars (select all that apply)?  Before meals and After meals  Have you had any blood sugars above 200?  No  Have you had any blood sugars below 70?  No    What symptoms do you notice when your blood sugar is low?  Dizzy    What concerns do you have today about your diabetes? None     Do you have any of these symptoms? (Select all that apply)  No numbness or " tingling in feet.  No redness, sores or blisters on feet.  No complaints of excessive thirst.  No reports of blurry vision.  No significant changes to weight.    Have you had a diabetic eye exam in the last 12 months? No                Hyperlipidemia Follow-Up      Are you regularly taking any medication or supplement to lower your cholesterol?   Yes- atorvastatin 20mg    Are you having muscle aches or other side effects that you think could be caused by your cholesterol lowering medication?  No    Hypertension Follow-up      Do you check your blood pressure regularly outside of the clinic? Yes     Are you following a low salt diet? Yes    Are your blood pressures ever more than 140 on the top number (systolic) OR more   than 90 on the bottom number (diastolic), for example 140/90? Yes    BP Readings from Last 2 Encounters:   07/21/21 (!) 155/65   04/20/21 104/70     Hemoglobin A1C (%)   Date Value   04/20/2021 5.7 (H)   12/01/2020 5.4     LDL Cholesterol Calculated (mg/dL)   Date Value   12/01/2020 155 (H)   07/15/2019 122 (H)         How many servings of fruits and vegetables do you eat daily?  2-3    On average, how many sweetened beverages do you drink each day (Examples: soda, juice, sweet tea, etc.  Do NOT count diet or artificially sweetened beverages)?   0    How many days per week do you exercise enough to make your heart beat faster? 7    How many minutes a day do you exercise enough to make your heart beat faster? 30 - 60    How many days per week do you miss taking your medication? 0    Talked to patient via  - phone visit.     Patient feels overall OK.     He is not sure which medications he is taking but denies any concerns and feels fine. Running out of bladder medication but other ones are still there.     Review of Systems         Objective           Vitals:  No vitals were obtained today due to virtual visit.    Physical Exam   healthy, alert and no distress  PSYCH: Alert and oriented  times 3; coherent speech, normal   rate and volume, able to articulate logical thoughts, able   to abstract reason, no tangential thoughts, no hallucinations   or delusions  His affect is normal  RESP: No cough, no audible wheezing, able to talk in full sentences  Remainder of exam unable to be completed due to telephone visits                Phone call duration: 14 minutes

## 2022-03-11 ENCOUNTER — PATIENT OUTREACH (OUTPATIENT)
Dept: GERIATRIC MEDICINE | Facility: CLINIC | Age: 87
End: 2022-03-11
Payer: COMMERCIAL

## 2022-03-11 DIAGNOSIS — R30.0 DYSURIA: ICD-10-CM

## 2022-03-11 DIAGNOSIS — R39.11 HESITANCY OF MICTURITION: ICD-10-CM

## 2022-03-11 DIAGNOSIS — I10 ESSENTIAL HYPERTENSION, BENIGN: ICD-10-CM

## 2022-03-12 NOTE — PROGRESS NOTES
[facility] Care Coordination Contact    Samuel Rivas Dr.    I am the [facility] care coordinator for Asha Sanford  Member is calling and is requesting refills for his medication:    metoprolol succinate ER (TOPROL-XL), tamsulosin (FLOMAX) 0.4 MG capsule  and Omeprazole.    [facility]acy: [facility] at  [address] [place].     All of my documentation can be found in [facility]. Please do not hesitate to contact me with any questions or concerns.    Thank you,    Daphne Ochoa, RN BSN PHN      [facility]  Care Coordinator  Phone:   [phone]  Fax:       [phone]

## 2022-03-14 DIAGNOSIS — E78.5 HYPERLIPIDEMIA LDL GOAL <100: Chronic | ICD-10-CM

## 2022-03-14 DIAGNOSIS — R39.11 HESITANCY OF MICTURITION: ICD-10-CM

## 2022-03-14 DIAGNOSIS — E55.9 VITAMIN D DEFICIENCY: ICD-10-CM

## 2022-03-14 DIAGNOSIS — I10 ESSENTIAL HYPERTENSION, BENIGN: ICD-10-CM

## 2022-03-14 DIAGNOSIS — R30.0 DYSURIA: ICD-10-CM

## 2022-03-14 RX ORDER — TAMSULOSIN HYDROCHLORIDE 0.4 MG/1
CAPSULE ORAL
Qty: 90 CAPSULE | Refills: 0 | Status: SHIPPED | OUTPATIENT
Start: 2022-03-14 | End: 2022-04-27

## 2022-03-14 RX ORDER — METOPROLOL SUCCINATE 50 MG/1
50 TABLET, EXTENDED RELEASE ORAL DAILY
Qty: 90 TABLET | Refills: 0 | Status: SHIPPED | OUTPATIENT
Start: 2022-03-14 | End: 2022-04-27

## 2022-03-14 RX ORDER — TAMSULOSIN HYDROCHLORIDE 0.4 MG/1
CAPSULE ORAL
Qty: 90 CAPSULE | Refills: 0 | Status: CANCELLED | OUTPATIENT
Start: 2022-03-14

## 2022-03-14 RX ORDER — METOPROLOL SUCCINATE 50 MG/1
50 TABLET, EXTENDED RELEASE ORAL DAILY
Qty: 90 TABLET | Refills: 0 | Status: CANCELLED | OUTPATIENT
Start: 2022-03-14

## 2022-03-14 RX ORDER — ATORVASTATIN CALCIUM 20 MG/1
20 TABLET, FILM COATED ORAL DAILY
Qty: 90 TABLET | Refills: 0 | Status: SHIPPED | OUTPATIENT
Start: 2022-03-14 | End: 2022-04-27

## 2022-03-14 NOTE — TELEPHONE ENCOUNTER
Pt calling in stating he is out of medication. Routing high priority.     I made follow-up HTN appt for 4/27.     MALIK NixonN RN  [facility]

## 2022-03-14 NOTE — TELEPHONE ENCOUNTER
Daughter called back and patient also needs refills on atorvastatin.  90 day supply of Atorvastatin sent to pharmacy per [facility] protocol.  Appointment scheduled 5/31/22 to follow-up on medications.  Magda Benítez RN  [facility]

## 2022-03-14 NOTE — TELEPHONE ENCOUNTER
See 3/11/22 patient outreach encounter.    Prescriptions approved per Dr. Lowe.    ADRYAN Newell, MALIKN, RN  [facility]

## 2022-03-14 NOTE — TELEPHONE ENCOUNTER
For some reason it wont let me sign, it says dept phone number missing.  If it lets you - ok to sign otherwise let me know how to fix this issue to sign the rx.   If continue to have problem - ok to call in verbally to pharmacy while we figure out what is actual issue.

## 2022-03-14 NOTE — TELEPHONE ENCOUNTER
Dr. Lowe-Please review and sign if agree.      Team Coordinators-Please contact patient to schedule blood pressure follow up visit.    Routing refill request to provider for review/approval because:  BP elevated  BP Readings from Last 3 Encounters:   07/21/21 (!) 155/65   04/20/21 104/70   01/26/21 136/64     Last Written Prescription Date:  6/23/21 and 7/24/21  Last Fill Quantity: 90,  # refills: 1   Last office visit: 12/3/21 virtual visit with prescribing provider:  Dr. Lowe   Future Office Visit:  None    Thank you!  MALIK UngerN, RN  [facility]

## 2022-04-27 ENCOUNTER — TELEPHONE (OUTPATIENT)
Dept: FAMILY MEDICINE | Facility: CLINIC | Age: 87
End: 2022-04-27

## 2022-04-27 ENCOUNTER — OFFICE VISIT (OUTPATIENT)
Dept: FAMILY MEDICINE | Facility: CLINIC | Age: 87
End: 2022-04-27
Payer: COMMERCIAL

## 2022-04-27 VITALS
DIASTOLIC BLOOD PRESSURE: 65 MMHG | BODY MASS INDEX: 26.93 KG/M2 | HEIGHT: 63 IN | TEMPERATURE: 98 F | WEIGHT: 152 LBS | HEART RATE: 54 BPM | OXYGEN SATURATION: 97 % | RESPIRATION RATE: 15 BRPM | SYSTOLIC BLOOD PRESSURE: 145 MMHG

## 2022-04-27 DIAGNOSIS — R39.11 HESITANCY OF MICTURITION: ICD-10-CM

## 2022-04-27 DIAGNOSIS — R30.0 DYSURIA: ICD-10-CM

## 2022-04-27 DIAGNOSIS — I10 HYPERTENSION GOAL BP (BLOOD PRESSURE) < 140/80: ICD-10-CM

## 2022-04-27 DIAGNOSIS — E78.5 HYPERLIPIDEMIA LDL GOAL <100: Chronic | ICD-10-CM

## 2022-04-27 DIAGNOSIS — E11.9 TYPE 2 DIABETES MELLITUS WITHOUT COMPLICATION, WITHOUT LONG-TERM CURRENT USE OF INSULIN (H): Primary | ICD-10-CM

## 2022-04-27 PROBLEM — J98.4 CALCIFIED GRANULOMA OF LUNG: Status: RESOLVED | Noted: 2019-01-29 | Resolved: 2022-04-27

## 2022-04-27 LAB — HBA1C MFR BLD: 5.5 % (ref 0–5.6)

## 2022-04-27 PROCEDURE — 80053 COMPREHEN METABOLIC PANEL: CPT | Performed by: FAMILY MEDICINE

## 2022-04-27 PROCEDURE — 80061 LIPID PANEL: CPT | Performed by: FAMILY MEDICINE

## 2022-04-27 PROCEDURE — 83036 HEMOGLOBIN GLYCOSYLATED A1C: CPT | Performed by: FAMILY MEDICINE

## 2022-04-27 PROCEDURE — 82043 UR ALBUMIN QUANTITATIVE: CPT | Performed by: FAMILY MEDICINE

## 2022-04-27 PROCEDURE — 99214 OFFICE O/P EST MOD 30 MIN: CPT | Performed by: FAMILY MEDICINE

## 2022-04-27 PROCEDURE — 36415 COLL VENOUS BLD VENIPUNCTURE: CPT | Performed by: FAMILY MEDICINE

## 2022-04-27 RX ORDER — METOPROLOL SUCCINATE 50 MG/1
50 TABLET, EXTENDED RELEASE ORAL DAILY
Qty: 90 TABLET | Refills: 1 | Status: SHIPPED | OUTPATIENT
Start: 2022-06-07 | End: 2022-10-19

## 2022-04-27 RX ORDER — FINASTERIDE 5 MG/1
5 TABLET, FILM COATED ORAL DAILY
Qty: 90 TABLET | Refills: 1 | Status: SHIPPED | OUTPATIENT
Start: 2022-06-01 | End: 2022-10-19

## 2022-04-27 RX ORDER — ATORVASTATIN CALCIUM 20 MG/1
20 TABLET, FILM COATED ORAL DAILY
Qty: 90 TABLET | Refills: 1 | Status: SHIPPED | OUTPATIENT
Start: 2022-06-07 | End: 2022-10-19

## 2022-04-27 RX ORDER — TAMSULOSIN HYDROCHLORIDE 0.4 MG/1
CAPSULE ORAL
Qty: 90 CAPSULE | Refills: 1 | Status: SHIPPED | OUTPATIENT
Start: 2022-06-14 | End: 2022-11-29

## 2022-04-27 ASSESSMENT — ANXIETY QUESTIONNAIRES
GAD7 TOTAL SCORE: 0
2. NOT BEING ABLE TO STOP OR CONTROL WORRYING: NOT AT ALL
1. FEELING NERVOUS, ANXIOUS, OR ON EDGE: NOT AT ALL
GAD7 TOTAL SCORE: 0
7. FEELING AFRAID AS IF SOMETHING AWFUL MIGHT HAPPEN: NOT AT ALL
4. TROUBLE RELAXING: NOT AT ALL
5. BEING SO RESTLESS THAT IT IS HARD TO SIT STILL: NOT AT ALL
6. BECOMING EASILY ANNOYED OR IRRITABLE: NOT AT ALL
GAD7 TOTAL SCORE: 0
3. WORRYING TOO MUCH ABOUT DIFFERENT THINGS: NOT AT ALL
7. FEELING AFRAID AS IF SOMETHING AWFUL MIGHT HAPPEN: NOT AT ALL

## 2022-04-27 ASSESSMENT — PATIENT HEALTH QUESTIONNAIRE - PHQ9
SUM OF ALL RESPONSES TO PHQ QUESTIONS 1-9: 0
SUM OF ALL RESPONSES TO PHQ QUESTIONS 1-9: 0
10. IF YOU CHECKED OFF ANY PROBLEMS, HOW DIFFICULT HAVE THESE PROBLEMS MADE IT FOR YOU TO DO YOUR WORK, TAKE CARE OF THINGS AT HOME, OR GET ALONG WITH OTHER PEOPLE: NOT DIFFICULT AT ALL

## 2022-04-27 NOTE — TELEPHONE ENCOUNTER
Faxed forms and made a copy for abstraction and put a copy in Dr Lowe's faxed forms folder.     Bia Anguiano, CMA

## 2022-04-27 NOTE — PROGRESS NOTES
Assessment & Plan     Type 2 diabetes mellitus without complication, without long-term current use of insulin (H)  Does well overall. Continue to monitor.   - HEMOGLOBIN A1C; Future  - Lipid panel reflex to direct LDL Fasting; Future  - Albumin Random Urine Quantitative with Creat Ratio; Future  - Comprehensive metabolic panel (BMP + Alb, Alk Phos, ALT, AST, Total. Bili, TP); Future  - HEMOGLOBIN A1C  - Lipid panel reflex to direct LDL Fasting  - Albumin Random Urine Quantitative with Creat Ratio  - Comprehensive metabolic panel (BMP + Alb, Alk Phos, ALT, AST, Total. Bili, TP)    Hypertension goal BP (blood pressure) < 140/80   bp above goal today. Fasting and adjusted med regime. OK to wait for 3-4 months before rechecking. Admits outside bp are fine. Continue with same rx.   - metoprolol succinate ER (TOPROL-XL) 50 MG 24 hr tablet; Take 1 tablet (50 mg) by mouth daily      Hyperlipidemia LDL goal <100   Patient is tolerating current medication without any major side effects of concerns and current dose seems reasonable too.  Current medication regime is effective. Continue current treatment without any changes.   - atorvastatin (LIPITOR) 20 MG tablet; Take 1 tablet (20 mg) by mouth daily    Hesitancy of micturition  Patient is tolerating current medication without any major side effects of concerns and current dose seems reasonable too.  Current medication regime is effective. Continue current treatment without any changes.   - finasteride (PROSCAR) 5 MG tablet; Take 1 tablet (5 mg) by mouth daily  - tamsulosin (FLOMAX) 0.4 MG capsule; TAKE 1 CAPSULE BY MOUTH EVERY DAY    Dysuria  Chronic. Most likely from bph. Ok to continue flomax and proscar.   - tamsulosin (FLOMAX) 0.4 MG capsule; TAKE 1 CAPSULE BY MOUTH EVERY DAY    On his list it shows sinemet for tremors but it has not been filled for few years. Due to language barrier and he is not aware which medications he takes - recommended him again to bring his  "pills next time. Family member was also notified to bring his all meds next time. They agreed.          BMI:   Estimated body mass index is 26.93 kg/m  as calculated from the following:    Height as of this encounter: 1.6 m (5' 3\").    Weight as of this encounter: 68.9 kg (152 lb).        Return in about 6 months (around 10/27/2022).    Samuel Lowe MD, MD  [facility] TEODORA Barry is a 87 year old who presents for the following health issues     History of Present Illness       Mental Health Follow-up:                    Today's PHQ-9         PHQ-9 Total Score: 0  PHQ-9 Q9 Thoughts of better off dead/self-harm past 2 weeks :   (P) Not at all    How difficult have these problems made it for you to do your work, take care of things at home, or get along with other people: Not difficult at all    Today's AHMET-7 Score: 0    Hypertension: He presents for follow up of hypertension.  He does check blood pressure  regularly outside of the clinic. Outpatient blood pressures have not been over 140/90. He follows a low salt diet.      Due to ramadan - fasting and taking pills in the evening when he breaks fast.     No issues with medications.      Ran out of one medicine (?flomax). Or about to run out of his medications.     2. Pt have day care forms to be complete with provider. Needs physical exam and medication list and problem list listed.     Would like to hold off on immunization until after Ramadan.     Review of Systems         Objective    BP (!) 145/65 (BP Location: Right arm, Patient Position: Chair, Cuff Size: Adult Regular)   Pulse 54   Temp 98  F (36.7  C) (Temporal)   Resp 15   Ht 1.6 m (5' 3\")   Wt 68.9 kg (152 lb)   SpO2 97%   BMI 26.93 kg/m    Body mass index is 26.93 kg/m .  Physical Exam   GENERAL: healthy, alert and no distress  EYES: Eyes grossly normal to inspection, PERRL and conjunctivae and sclerae normal  HENT: ear canals and TM's normal, nose " and mouth without ulcers or lesions  NECK: no adenopathy, no asymmetry, masses, or scars and thyroid normal to palpation  RESP: lungs clear to auscultation - no rales, rhonchi or wheezes  CV: regular rate and rhythm, normal S1 S2, no S3 or S4, no murmur, click or rub, no peripheral edema and peripheral pulses strong  ABDOMEN: soft, nontender, no hepatosplenomegaly, no masses and bowel sounds normal  MS: no gross musculoskeletal defects noted, no edema  SKIN: no suspicious lesions or rashes  NEURO: Normal strength and tone, mentation intact and speech normal  PSYCH: mentation appears normal.       Patient Active Problem List    Diagnosis Date Noted     Encounter for diabetic foot exam (H) 11/29/2018     Priority: Medium     Normal MONOFILAMENT TESTING   MILDLY WEEK PULSES BILATERAL        Tension headache 10/18/2016     Priority: Medium     Hyponatremia 03/21/2016     Priority: Medium     ACP (advance care planning) 11/24/2015     Priority: Medium     Advance Care Planning 11/24/2015: ACP Review and Resources Provided:  Reviewed chart for advance care plan.  Asha Sanford has no plan or code status on file. Discussed available resources and provided with information.. Added by Birdie Lynch      Advance Care Planning 10/4/2016: ACP Review of Chart / Resources Provided:  Reviewed chart for advance care plan.  Asha Sanford has no plan or code status on file. Discussed available resources and provided with information.  Confirmed/documented verbally designated decision makers.    Added by Michelle Melchor           Lung nodule seen on imaging study 05/11/2015     Priority: Medium     Bladder stones 05/11/2015     Priority: Medium     Headache 05/06/2015     Priority: Medium     Problem list name updated by automated process. Provider to review       Nausea 05/06/2015     Priority: Medium     Lumbago 03/06/2014     Priority: Medium     Chronic pain of both knees 03/06/2014     Priority: Medium     Hypertension  goal BP (blood pressure) < 140/80 01/28/2014     Priority: Medium     Type 2 diabetes mellitus without complications (H) 01/28/2014     Priority: Medium     Pain in joint involving lower leg 10/18/2013     Priority: Medium      rt lower leg at night        Hyperlipidemia LDL goal <100 11/21/2012     Priority: Medium     Dysuria 10/29/2012     Priority: Medium     Essential and other specified forms of tremor 10/29/2012     Priority: Medium     Abnormal involuntary movement 10/29/2012     Priority: Medium     Problem list name updated by automated process. Provider to review       Lipoma 10/29/2012     Priority: Medium     Left upper back  Problem list name updated by automated process. Provider to review       Cervical spondylosis without myelopathy 10/29/2012     Priority: Medium     Degeneration of cervical intervertebral disc 10/29/2012     Priority: Medium     Vitamin D deficiency 10/29/2012     Priority: Medium     Problem list name updated by automated process. Provider to review       Memory loss 10/29/2012     Priority: Medium     Insomnia 10/29/2012     Priority: Medium     Problem list name updated by automated process. Provider to review       Gastroesophageal reflux disease without esophagitis 10/29/2012     Priority: Medium     Current Outpatient Medications   Medication     ACE/ARB NOT PRESCRIBED, INTENTIONAL,     acetaminophen (MAPAP) 500 MG tablet     albuterol (PROAIR HFA/PROVENTIL HFA/VENTOLIN HFA) 108 (90 Base) MCG/ACT inhaler     [START ON 6/7/2022] atorvastatin (LIPITOR) 20 MG tablet     carbidopa-levodopa (SINEMET)  MG tablet     [START ON 6/1/2022] finasteride (PROSCAR) 5 MG tablet     HM EARWAX REMOVAL AID 6.5 % otic solution     [START ON 6/7/2022] metoprolol succinate ER (TOPROL-XL) 50 MG 24 hr tablet     neomycin-polymyxin-hydrocortisone (CORTISPORIN) 3.5-95963-1 otic suspension     omeprazole (PRILOSEC) 20 MG DR capsule     polyethylene glycol-propylene glycol (SYSTANE) 0.4-0.3 %  SOLN ophthalmic solution     polyvinyl alcohol (ARTIFICIAL TEARS) 1.4 % ophthalmic solution     [START ON 6/14/2022] tamsulosin (FLOMAX) 0.4 MG capsule     vitamin D2 (ERGOCALCIFEROL) 56161 units (1250 mcg) capsule     No current facility-administered medications for this visit.

## 2022-04-27 NOTE — LETTER
May 2, 2022      Asha Sanford  [address]  [facility] [address]        Dear ,      We are writing to inform you of your test results. Your cholesterol is high and I wonder if you are taking your cholesterol medication atorvastatin regularly.  I would recommend you to continue take your cholesterol medication as it is prescribed.  Your diabetes test, kidney test, liver test and urine test for leaking protein all are within normal limit.        Resulted Orders   HEMOGLOBIN A1C   Result Value Ref Range    Hemoglobin A1C 5.5 0.0 - 5.6 %      Comment:      Normal <5.7%   Prediabetes 5.7-6.4%    Diabetes 6.5% or higher     Note: Adopted from ADA consensus guidelines.   Lipid panel reflex to direct LDL Fasting   Result Value Ref Range    Cholesterol 225 (H) <200 mg/dL    Triglycerides 107 <150 mg/dL    Direct Measure HDL 53 >=40 mg/dL    LDL Cholesterol Calculated 151 (H) <=100 mg/dL    Non HDL Cholesterol 172 (H) <130 mg/dL    Patient Fasting > 8hrs? Yes     Narrative    Cholesterol  Desirable:  <200 mg/dL    Triglycerides  Normal:  Less than 150 mg/dL  Borderline High:  150-199 mg/dL  High:  200-499 mg/dL  Very High:  Greater than or equal to 500 mg/dL    Direct Measure HDL  Female:  Greater than or equal to 50 mg/dL   Male:  Greater than or equal to 40 mg/dL    LDL Cholesterol  Desirable:  <100mg/dL  Above Desirable:  100-129 mg/dL   Borderline High:  130-159 mg/dL   High:  160-189 mg/dL   Very High:  >= 190 mg/dL    Non HDL Cholesterol  Desirable:  130 mg/dL  Above Desirable:  130-159 mg/dL  Borderline High:  160-189 mg/dL  High:  190-219 mg/dL  Very High:  Greater than or equal to 220 mg/dL   Albumin Random Urine Quantitative with Creat Ratio   Result Value Ref Range    Creatinine Urine mg/dL 93 mg/dL    Albumin Urine mg/L <5 mg/L    Albumin Urine mg/g Cr        Comment:      Unable to calculate:  Urine creatinine or albumin value below detectable level   Comprehensive  metabolic panel (BMP + Alb, Alk Phos, ALT, AST, Total. Bili, TP)   Result Value Ref Range    Sodium 140 133 - 144 mmol/L    Potassium 4.3 3.4 - 5.3 mmol/L    Chloride 108 94 - 109 mmol/L    Carbon Dioxide (CO2) 29 20 - 32 mmol/L      Comment:      This result was previously suppressed from the chart.    Anion Gap 3 3 - 14 mmol/L      Comment:      This result was previously suppressed from the chart.    Urea Nitrogen 15 7 - 30 mg/dL      Comment:      This result was previously suppressed from the chart.    Creatinine 0.95 0.66 - 1.25 mg/dL      Comment:      This result was previously suppressed from the chart.    Calcium 8.9 8.5 - 10.1 mg/dL      Comment:      This result was previously suppressed from the chart.    Glucose 86 70 - 99 mg/dL      Comment:      This result was previously suppressed from the chart.    Alkaline Phosphatase 57 40 - 150 U/L      Comment:      This result was previously suppressed from the chart.    AST 16 0 - 45 U/L      Comment:      This result was previously suppressed from the chart.    ALT 18 0 - 70 U/L      Comment:      This result was previously suppressed from the chart.    Protein Total 7.1 6.8 - 8.8 g/dL      Comment:      This result was previously suppressed from the chart.    Albumin 3.8 3.4 - 5.0 g/dL      Comment:      This result was previously suppressed from the chart.    Bilirubin Total 0.4 0.2 - 1.3 mg/dL      Comment:      This result was previously suppressed from the chart.    GFR Estimate 78 >60 mL/min/1.73m2      Comment:      Effective December 21, 2021 eGFRcr in adults is calculated using the 2021 CKD-EPI creatinine equation which includes age and gender (Heatehr et al., NEJM, DOI: 10.1056/TXTWoi3783716)   This result was previously suppressed from the chart.       If you have any questions or concerns, please call the clinic at the number listed above.       Sincerely,      Samuel Lowe MD/

## 2022-04-28 LAB
ALBUMIN SERPL-MCNC: 3.8 G/DL (ref 3.4–5)
ALP SERPL-CCNC: 57 U/L (ref 40–150)
ALT SERPL W P-5'-P-CCNC: 18 U/L (ref 0–70)
ANION GAP SERPL CALCULATED.3IONS-SCNC: 3 MMOL/L (ref 3–14)
AST SERPL W P-5'-P-CCNC: 16 U/L (ref 0–45)
BILIRUB SERPL-MCNC: 0.4 MG/DL (ref 0.2–1.3)
BUN SERPL-MCNC: 15 MG/DL (ref 7–30)
CALCIUM SERPL-MCNC: 8.9 MG/DL (ref 8.5–10.1)
CHLORIDE BLD-SCNC: 108 MMOL/L (ref 94–109)
CHOLEST SERPL-MCNC: 225 MG/DL
CO2 SERPL-SCNC: 29 MMOL/L (ref 20–32)
CREAT SERPL-MCNC: 0.95 MG/DL (ref 0.66–1.25)
CREAT UR-MCNC: 93 MG/DL
FASTING STATUS PATIENT QL REPORTED: YES
GFR SERPL CREATININE-BSD FRML MDRD: 78 ML/MIN/1.73M2
GLUCOSE BLD-MCNC: 86 MG/DL (ref 70–99)
HDLC SERPL-MCNC: 53 MG/DL
LDLC SERPL CALC-MCNC: 151 MG/DL
MICROALBUMIN UR-MCNC: <5 MG/L
MICROALBUMIN/CREAT UR: NORMAL MG/G{CREAT}
NONHDLC SERPL-MCNC: 172 MG/DL
POTASSIUM BLD-SCNC: 4.3 MMOL/L (ref 3.4–5.3)
PROT SERPL-MCNC: 7.1 G/DL (ref 6.8–8.8)
SODIUM SERPL-SCNC: 140 MMOL/L (ref 133–144)
TRIGL SERPL-MCNC: 107 MG/DL

## 2022-04-28 ASSESSMENT — ANXIETY QUESTIONNAIRES: GAD7 TOTAL SCORE: 0

## 2022-04-28 ASSESSMENT — PATIENT HEALTH QUESTIONNAIRE - PHQ9: SUM OF ALL RESPONSES TO PHQ QUESTIONS 1-9: 0

## 2022-05-12 ENCOUNTER — PATIENT OUTREACH (OUTPATIENT)
Dept: GERIATRIC MEDICINE | Facility: CLINIC | Age: 87
End: 2022-05-12
Payer: COMMERCIAL

## 2022-05-12 ASSESSMENT — ACTIVITIES OF DAILY LIVING (ADL): DEPENDENT_IADLS:: CLEANING;COOKING;LAUNDRY;SHOPPING;MEAL PREPARATION;MEDICATION MANAGEMENT;TRANSPORTATION

## 2022-05-12 NOTE — PROGRESS NOTES
[facility] Care Coordination Contact    [facility] Home Visit Assessment     Telephonic visit for Health Risk Assessment with Asha Sanford completed on May 12, 2022    Type of residence:: Apartment  Current living arrangement:: I live alone     Current Care Plan  Member currently receiving the following home care services:   N/A  Member currently receiving the following community resources: Day Care, adult day care transportation  PCA, Homemaking and monthly incontinent supply      Medication Review  Medication reconciliation completed in Epic: Yes  Medication set-up & administration: Family/informal caregiver sets up daily.  Family caregiver administers medications.  Medication Risk Assessment Medication (1 or more, place referral to MTM): Taking 1 or more high-risk medications for adults >65 years  MTM Referral Placed: No: declining at this time    Mental/Behavioral Health   Depression Screening:      PHQ-9 Total Score: 6    Mental health DX:: Yes (Insomnia)        ADL/IADL Dependencies:   Dependent ADLs:: Ambulation-walker, Bathing, Dressing, Grooming, Transfers  Dependent IADLs:: Cleaning, Cooking, Laundry, Shopping, Meal Preparation, Medication Management, Transportation    [facility] sponsored benefits: Shared information re: Silver Sneakers/gym memberships, ASA, Calcium +D.    PCA Assessment completed at visit: Yes Annual PCA assessment indicated 14 units per day of PCA. This is the same as the previous assessment.     Elderly Waiver Eligibility: Yes-will continue on EW    Care Plan & Recommendations: Member to continue current POC including adult day care,  adult day care transportation  PCA, Homemaking and monthly incontinent supply    See [facility]CC for detailed assessment information.    Follow-Up Plan: Member informed of future contact, plan to f/u with member with a 6 month telephone assessment.  Contact information shared with member and family, encouraged member to call with any  questions or concerns at any time.    [facility] care continuum providers: Please see Snapshot and Care Management Flowsheets for Specific details of care plan.    This CC note routed to PCP.    Daphne Ochoa RN BSN PHN      [facility]  Care Coordinator  Phone:   [phone]  Fax:       [phone]

## 2022-05-25 ENCOUNTER — PATIENT OUTREACH (OUTPATIENT)
Dept: GERIATRIC MEDICINE | Facility: CLINIC | Age: 87
End: 2022-05-25
Payer: COMMERCIAL

## 2022-05-25 NOTE — LETTER
May 25, 2022      ALFREDO ALEMAN  [address]  [place] [address]      Dear Alfredo:    At [facility], we are dedicated to improving your health and well-being. Enclosed is the Comprehensive Care Plan that we developed with you on 5/12/2022. Please review the Care Plan carefully.    As a reminder, some of the things we discussed at your visit include:    Your physical and mental health    Ways to reduce falls    Health care needs you may have    Don t forget to contact your care coordinator if you:    Have been hospitalized or plan to be hospitalized     Have had a fall     Have experienced a change in physical health    Are experiencing emotional problems     If you do not agree with your Care Plan, have questions about it, or have experienced a change in your needs, please call me at [phone]. If you are hearing impaired, please call the [place] [employer] at 574 or [phone] (xeaeif-dy-lvpjdq relay service).    Sincerely,    CRISTY Middleton, RN, PHN    E-mail: [email or web address]  Phone: [phone]      [facility] ([facility]) is a health plan that contracts with both Medicare and the [place] Medical Assistance (Medicaid) program to provide benefits of both programs to enrollees. Enrollment in [facility] depends on contract renewal.    [id number])     [id number] (11/18)

## 2022-05-25 NOTE — PROGRESS NOTES
[facility] Care Coordination Contact    Received after visit chart from care coordinator.  Completed following tasks: Mailed copy of care plan to client, Updated services in Database, Submitted referrals/auths for ADC, transportation and homemaking, Mailed copy of POC signature sheet for member to sign and return in SASE , Mailed [facility] Safe Medication Disposal  and Mailed PCA sig page to member  , Provider Signature - No POC Shared:  Member indicates that they do not want their POC shared with any EW providers.    [facility]:  Emailed completed PCA assessment to [facility].  Faxed copy of PCA assessment to PCA Agency and mailed copy to member.  Faxed MD Communication to PCP.     Jennifer Ennis  Care Management Specialist  [facility]  [phone]

## 2022-06-08 ENCOUNTER — HOSPITAL ENCOUNTER (EMERGENCY)
Facility: CLINIC | Age: 87
Discharge: HOME | End: 2022-06-08
Attending: EMERGENCY MEDICINE | Admitting: EMERGENCY MEDICINE
Payer: COMMERCIAL

## 2022-06-08 ENCOUNTER — APPOINTMENT (OUTPATIENT)
Dept: GENERAL RADIOLOGY | Facility: CLINIC | Age: 87
End: 2022-06-08
Attending: EMERGENCY MEDICINE
Payer: COMMERCIAL

## 2022-06-08 VITALS
HEART RATE: 98 BPM | OXYGEN SATURATION: 93 % | SYSTOLIC BLOOD PRESSURE: 167 MMHG | TEMPERATURE: 98.5 F | DIASTOLIC BLOOD PRESSURE: 76 MMHG | RESPIRATION RATE: 16 BRPM

## 2022-06-08 DIAGNOSIS — J18.9 PNEUMONIA OF BOTH LOWER LOBES DUE TO INFECTIOUS ORGANISM: Primary | ICD-10-CM

## 2022-06-08 DIAGNOSIS — J06.9 UPPER RESPIRATORY TRACT INFECTION, UNSPECIFIED TYPE: ICD-10-CM

## 2022-06-08 LAB
ANION GAP SERPL CALCULATED.3IONS-SCNC: 4 MMOL/L (ref 3–14)
ATRIAL RATE - MUSE: 87 BPM
BASOPHILS # BLD AUTO: 0 10E3/UL (ref 0–0.2)
BASOPHILS NFR BLD AUTO: 1 %
BUN SERPL-MCNC: 14 MG/DL (ref 7–30)
CALCIUM SERPL-MCNC: 9.2 MG/DL (ref 8.5–10.1)
CHLORIDE BLD-SCNC: 102 MMOL/L (ref 94–109)
CO2 SERPL-SCNC: 30 MMOL/L (ref 20–32)
CREAT SERPL-MCNC: 0.97 MG/DL (ref 0.66–1.25)
DIASTOLIC BLOOD PRESSURE - MUSE: NORMAL MMHG
EOSINOPHIL # BLD AUTO: 0.1 10E3/UL (ref 0–0.7)
EOSINOPHIL NFR BLD AUTO: 3 %
ERYTHROCYTE [DISTWIDTH] IN BLOOD BY AUTOMATED COUNT: 12.8 % (ref 10–15)
FLUAV RNA SPEC QL NAA+PROBE: NEGATIVE
FLUBV RNA RESP QL NAA+PROBE: NEGATIVE
GFR SERPL CREATININE-BSD FRML MDRD: 77 ML/MIN/1.73M2
GLUCOSE BLD-MCNC: 93 MG/DL (ref 70–99)
HCT VFR BLD AUTO: 44.6 % (ref 40–53)
HGB BLD-MCNC: 14.4 G/DL (ref 13.3–17.7)
IMM GRANULOCYTES # BLD: 0 10E3/UL
IMM GRANULOCYTES NFR BLD: 0 %
INTERPRETATION ECG - MUSE: NORMAL
LYMPHOCYTES # BLD AUTO: 1.8 10E3/UL (ref 0.8–5.3)
LYMPHOCYTES NFR BLD AUTO: 40 %
MCH RBC QN AUTO: 30.6 PG (ref 26.5–33)
MCHC RBC AUTO-ENTMCNC: 32.3 G/DL (ref 31.5–36.5)
MCV RBC AUTO: 95 FL (ref 78–100)
MONOCYTES # BLD AUTO: 0.9 10E3/UL (ref 0–1.3)
MONOCYTES NFR BLD AUTO: 20 %
NEUTROPHILS # BLD AUTO: 1.6 10E3/UL (ref 1.6–8.3)
NEUTROPHILS NFR BLD AUTO: 36 %
NRBC # BLD AUTO: 0 10E3/UL
NRBC BLD AUTO-RTO: 0 /100
NT-PROBNP SERPL-MCNC: 327 PG/ML (ref 0–1800)
P AXIS - MUSE: 67 DEGREES
PLATELET # BLD AUTO: 191 10E3/UL (ref 150–450)
POTASSIUM BLD-SCNC: 3.7 MMOL/L (ref 3.4–5.3)
PR INTERVAL - MUSE: 272 MS
QRS DURATION - MUSE: 94 MS
QT - MUSE: 394 MS
QTC - MUSE: 474 MS
R AXIS - MUSE: -37 DEGREES
RBC # BLD AUTO: 4.71 10E6/UL (ref 4.4–5.9)
RSV RNA SPEC NAA+PROBE: NEGATIVE
SARS-COV-2 RNA RESP QL NAA+PROBE: NEGATIVE
SODIUM SERPL-SCNC: 136 MMOL/L (ref 133–144)
SYSTOLIC BLOOD PRESSURE - MUSE: NORMAL MMHG
T AXIS - MUSE: 51 DEGREES
TROPONIN I SERPL HS-MCNC: 9 NG/L
VENTRICULAR RATE- MUSE: 87 BPM
WBC # BLD AUTO: 4.5 10E3/UL (ref 4–11)

## 2022-06-08 PROCEDURE — C9803 HOPD COVID-19 SPEC COLLECT: HCPCS

## 2022-06-08 PROCEDURE — 83880 ASSAY OF NATRIURETIC PEPTIDE: CPT | Performed by: STUDENT IN AN ORGANIZED HEALTH CARE EDUCATION/TRAINING PROGRAM

## 2022-06-08 PROCEDURE — 99285 EMERGENCY DEPT VISIT HI MDM: CPT | Mod: 25

## 2022-06-08 PROCEDURE — 250N000009 HC RX 250: Performed by: STUDENT IN AN ORGANIZED HEALTH CARE EDUCATION/TRAINING PROGRAM

## 2022-06-08 PROCEDURE — 94640 AIRWAY INHALATION TREATMENT: CPT

## 2022-06-08 PROCEDURE — 36415 COLL VENOUS BLD VENIPUNCTURE: CPT | Performed by: STUDENT IN AN ORGANIZED HEALTH CARE EDUCATION/TRAINING PROGRAM

## 2022-06-08 PROCEDURE — 71046 X-RAY EXAM CHEST 2 VIEWS: CPT

## 2022-06-08 PROCEDURE — 87637 SARSCOV2&INF A&B&RSV AMP PRB: CPT | Performed by: EMERGENCY MEDICINE

## 2022-06-08 PROCEDURE — 85014 HEMATOCRIT: CPT | Performed by: STUDENT IN AN ORGANIZED HEALTH CARE EDUCATION/TRAINING PROGRAM

## 2022-06-08 PROCEDURE — 82310 ASSAY OF CALCIUM: CPT | Performed by: STUDENT IN AN ORGANIZED HEALTH CARE EDUCATION/TRAINING PROGRAM

## 2022-06-08 PROCEDURE — 93005 ELECTROCARDIOGRAM TRACING: CPT

## 2022-06-08 PROCEDURE — 84484 ASSAY OF TROPONIN QUANT: CPT | Performed by: STUDENT IN AN ORGANIZED HEALTH CARE EDUCATION/TRAINING PROGRAM

## 2022-06-08 RX ORDER — BENZONATATE 100 MG/1
100 CAPSULE ORAL 3 TIMES DAILY PRN
Qty: 10 CAPSULE | Refills: 0 | Status: SHIPPED | OUTPATIENT
Start: 2022-06-08 | End: 2022-06-13

## 2022-06-08 RX ORDER — IPRATROPIUM BROMIDE AND ALBUTEROL SULFATE 2.5; .5 MG/3ML; MG/3ML
3 SOLUTION RESPIRATORY (INHALATION) ONCE
Status: COMPLETED | OUTPATIENT
Start: 2022-06-08 | End: 2022-06-08

## 2022-06-08 RX ORDER — AZITHROMYCIN 250 MG/1
TABLET, FILM COATED ORAL
Qty: 6 TABLET | Refills: 0 | Status: SHIPPED | OUTPATIENT
Start: 2022-06-08 | End: 2022-06-13

## 2022-06-08 RX ADMIN — IPRATROPIUM BROMIDE AND ALBUTEROL SULFATE 3 ML: .5; 3 SOLUTION RESPIRATORY (INHALATION) at 15:43

## 2022-06-08 ASSESSMENT — ENCOUNTER SYMPTOMS
RHINORRHEA: 1
FEVER: 1
NAUSEA: 0
DIZZINESS: 0
NUMBNESS: 0
ABDOMINAL PAIN: 0
HEADACHES: 1
SORE THROAT: 1
SHORTNESS OF BREATH: 0
BLOOD IN STOOL: 0
COUGH: 1
HEMATURIA: 0
VOMITING: 0

## 2022-06-08 NOTE — ED TRIAGE NOTES
Fever and cough that started yesterday.      Triage Assessment     Row Name 06/08/22 0972       Triage Assessment (Adult)    Airway WDL WDL       Respiratory WDL    Respiratory WDL WDL       Skin Circulation/Temperature WDL    Skin Circulation/Temperature WDL WDL       Peripheral/Neurovascular WDL    Peripheral Neurovascular WDL WDL       Cognitive/Neuro/Behavioral WDL    Cognitive/Neuro/Behavioral WDL WDL

## 2022-06-08 NOTE — ED PROVIDER NOTES
ED ATTENDING PHYSICIAN NOTE:   I evaluated this patient in conjunction with Mariana Lipscomb PA-C  I have participated in the care of the patient and personally performed key elements of the history, exam, and medical decision making.      HPI:   Asha Sanford is a 87 year old male with a history of type II diabetes, hypertension, and hyperlipidemia, who presents with cough. The patient reports that he began coughing last night and experiences pain with each cough, a sore throat, and headache, with no other presented symptoms. He reports tylenol use with no help.     EXAM:   BP (!) 167/76   Pulse 98   Temp 98.5  F (36.9  C) (Oral)   Resp 16   SpO2 93%   General: Alert, appears elderly, otherwise well-developed and well-nourished. Cooperative.     In moderate respiratory distress  HEENT:  Head:  Atraumatic  Ears:  External ears are normal  Mouth/Throat:  Oropharynx is without erythema or exudate and mucous membranes are moist.   Eyes:   Conjunctivae normal and EOM are normal. No scleral icterus.  CV:  Normal rate, regular rhythm, normal heart sounds and radial pulses are 2+ and symmetric.  Systolic murmur.  Resp:  Breath sounds are coarse bilaterally with expiratory wheezing and crackles.     Non-labored, no retractions or accessory muscle use  GI:  Abdomen is soft, no distension, no tenderness. No rebound or guarding.  No CVA tenderness bilaterally  MS:  Normal range of motion. No edema.    Normal strength in all 4 extremities.     Back atraumatic.    No midline cervical, thoracic, or lumbar tenderness  Skin:  Warm and dry.  No rash or lesions noted.  Neuro: Alert. Normal strength.  GCS: 15  Psych:  Normal mood and affect.     Chest XR,  PA & LAT   Final Result   IMPRESSION: PA and lateral views of the chest were obtained.   Cardiomediastinal silhouette is within normal limits. Mild basilar   pulmonary opacities, likely atelectasis. No significant pleural   effusion or pneumothorax.      SEGUNDO VALLADARES MD             SYSTEM ID:  [id number]        Labs Ordered and Resulted from Time of ED Arrival to Time of ED Departure   INFLUENZA A/B & SARS-COV2 PCR MULTIPLEX - Normal       Result Value    Influenza A PCR Negative      Influenza B PCR Negative      RSV PCR Negative      SARS CoV2 PCR Negative     BASIC METABOLIC PANEL - Normal    Sodium 136      Potassium 3.7      Chloride 102      Carbon Dioxide (CO2) 30      Anion Gap 4      Urea Nitrogen 14      Creatinine 0.97      Calcium 9.2      Glucose 93      GFR Estimate 77     TROPONIN I - Normal    Troponin I High Sensitivity 9     NT PROBNP INPATIENT - Normal    N terminal Pro BNP Inpatient 327     CBC WITH PLATELETS AND DIFFERENTIAL    WBC Count 4.5      RBC Count 4.71      Hemoglobin 14.4      Hematocrit 44.6      MCV 95      MCH 30.6      MCHC 32.3      RDW 12.8      Platelet Count 191      % Neutrophils 36      % Lymphocytes 40      % Monocytes 20      % Eosinophils 3      % Basophils 1      % Immature Granulocytes 0      NRBCs per 100 WBC 0      Absolute Neutrophils 1.6      Absolute Lymphocytes 1.8      Absolute Monocytes 0.9      Absolute Eosinophils 0.1      Absolute Basophils 0.0      Absolute Immature Granulocytes 0.0      Absolute NRBCs 0.0       MEDICAL DECISION MAKING/ASSESSMENT AND PLAN:   Patient is a 87-year-old male with a history of type 2 diabetes, hypertension, and hyperlipidemia who presents with cough and shortness of breath.  A broad work-up was pursued regarding his dyspnea and productive cough.  Thankfully vital signs are stable here.  He did have significant expiratory wheezing and so a DuoNeb was provided with significant improvement in both his cough and wheezing.  Chest x-ray concerning for bibasilar opacities which may be viral in etiology but cannot fully exclude a superimposed bacterial pneumonia.  We will plan to treat with antibiotics appropriate for outpatient community-acquired pneumonia treatment with Augmentin and azithromycin.   Thankfully COVID and influenza negative.  EKG negative for ischemic changes.  Troponin within normal range and so lower concern for ACS.  Remainder of blood work within normal range.  Patient stable for discharge and outpatient treatment of suspected community-acquired pneumonia and cough.  Will be discharged with Tessalon Perles for symptomatic cough.  After all questions answered and return precautions understood, discharged home in care of family.     Due to language barrier, an  was present during the history-taking and subsequent discussion (and for part of the physical exam) with this patient.    DIAGNOSIS:     ICD-10-CM    1. Pneumonia of both lower lobes due to infectious organism  J18.9    2. Upper respiratory tract infection, unspecified type  J06.9       DISPOSITION:   The patient was discharged home.      6/8/2022  [facility] EMERGENCY DEPT     Bc Anedrson MD  06/08/22 1946

## 2022-06-08 NOTE — DISCHARGE INSTRUCTIONS

## 2022-06-08 NOTE — ED PROVIDER NOTES
History   Chief Complaint:  Covid Holli       [facility]   Certified  was used to aid in collection of history.    Asha Sanford is a 87 year old male with history of type 2 diabetes, hypertension, and hyperlipidemia, who presents with cough.  Patient reports that he began coughing last night.  He also reports a subjective fever, however he has not measured his temperature at home.  He last took Tylenol this morning.  He reports a cough productive of green and yellow sputum.  He also reports a sore throat and headache.  He denies shortness of breath.  He does reports chest pain only associated with coughing, which began last night.  He denies exertional symptoms.  He denies nausea, vomiting, abdominal pain, blood in his urine or stool, numbness, tingling, or dizziness.  He is requesting antibiotics.    Review of Systems   Constitutional: Positive for fever.   HENT: Positive for rhinorrhea and sore throat. Negative for ear pain.    Eyes: Negative for visual disturbance.   Respiratory: Positive for cough. Negative for shortness of breath.    Cardiovascular: Positive for chest pain (with coughing).   Gastrointestinal: Negative for abdominal pain, blood in stool, nausea and vomiting.   Genitourinary: Negative for hematuria.   Neurological: Positive for headaches. Negative for dizziness and numbness.   All other systems reviewed and are negative.      Allergies:  Aspirin    Medications:  Mapap  Proair  Lipitor   Sinemet   Proscar  Toprol-XL   Prilosec  Systane   Flomax  Ergocalciferol    Past Medical History:     Arthritis   Hypertension  Hypercholesteremia   Migraine  Type 2 diabetes  UTI     Past Surgical History:    Retinal detatch  Shoulder surgery    Social History:  Arrived via EMS.   Escorted by family member.     Physical Exam     Patient Vitals for the past 24 hrs:   BP Temp Temp src Pulse Resp SpO2   06/08/22 1520 (!) 160/73 -- -- 87 -- 92 %   06/08/22 1440 -- 98.5  F (36.9  C) Oral -- --  --   06/08/22 0947 139/62 98.6  F (37  C) Temporal 84 16 96 %       Physical Exam  Vitals: Reviewed, as above.   General: Alert and oriented, in mild distress. Resting on bed.  Skin: Warm and well-perfused. No rashes, lesions, or erythema.   HEENT: Head: Normocephalic, atraumatic. Facial features symmetric. Eyes: Conjunctiva pink, sclera white. EOMs grossly intact. Ears: Auricles without lesion, erythema, or edema. Mouth and throat: Lips are moist with no chapping, lesions, or edema, Buccal mucosa is pink and moist without lesions. Oropharyngeal mucosa is pink and moist with no erythema, edema, or exudate.   Neck: Supple with no lymphadenopathy. Full ROM.   Pulmonary: Chest wall expansion symmetric with no increased work of breathing. Lungs with scattered wheezes on auscultation bilaterally. Improved air movement and decreased wheezing following duoneb treatment.  Cardiovascular: Heart RRR with no murmurs, rubs, or gallops. 2+ radial and tibialis posterior pulses bilaterally. No peripheral edema.  Abdominal: No hernias, scars, lesions, striae, or distension. Bowel sounds present and physiologic. Abdomen is soft and nontender to light and deep palpation in all 4 quadrants with no guarding or rebound. No masses or organomegaly.   Musculoskeletal: Moves all extremities spontaneously.  Psych: Affect appropriate.  Answers questions appropriately. Patient appears calm.      Emergency Department Course   ECG  ECG taken at 1525, ECG read at 1532  Sinus rhythm with 1st degree AV block   Left axis deviation   Minimal voltage criteria for LVH, may be normal variant   Abnormal ECG   No significant change as compared to prior, dated 6/8/22.  Rate 87 bpm. NV interval 272 ms. QRS duration 94 ms. QT/QTc 394/474 ms. P-R-T axes 67 -37 51.     Imaging:  Chest XR,  PA & LAT   Final Result   IMPRESSION: PA and lateral views of the chest were obtained.   Cardiomediastinal silhouette is within normal limits. Mild basilar   pulmonary  opacities, likely atelectasis. No significant pleural   effusion or pneumothorax.      SEGUNDO VALLADARES MD            SYSTEM ID:  [id number]        Report per radiology    Laboratory:  Labs Ordered and Resulted from Time of ED Arrival to Time of ED Departure   INFLUENZA A/B & SARS-COV2 PCR MULTIPLEX - Normal       Result Value    Influenza A PCR Negative      Influenza B PCR Negative      RSV PCR Negative      SARS CoV2 PCR Negative     BASIC METABOLIC PANEL - Normal    Sodium 136      Potassium 3.7      Chloride 102      Carbon Dioxide (CO2) 30      Anion Gap 4      Urea Nitrogen 14      Creatinine 0.97      Calcium 9.2      Glucose 93      GFR Estimate 77     TROPONIN I - Normal    Troponin I High Sensitivity 9     NT PROBNP INPATIENT - Normal    N terminal Pro BNP Inpatient 327     CBC WITH PLATELETS AND DIFFERENTIAL    WBC Count 4.5      RBC Count 4.71      Hemoglobin 14.4      Hematocrit 44.6      MCV 95      MCH 30.6      MCHC 32.3      RDW 12.8      Platelet Count 191      % Neutrophils 36      % Lymphocytes 40      % Monocytes 20      % Eosinophils 3      % Basophils 1      % Immature Granulocytes 0      NRBCs per 100 WBC 0      Absolute Neutrophils 1.6      Absolute Lymphocytes 1.8      Absolute Monocytes 0.9      Absolute Eosinophils 0.1      Absolute Basophils 0.0      Absolute Immature Granulocytes 0.0      Absolute NRBCs 0.0          Emergency Department Course:     Reviewed:  I reviewed nursing notes, vitals and past medical history    Assessments/Consults:  ED Course as of 06/08/22 1653   Wed Jun 08, 2022   1435 I obtained history and examined the patient, as noted above.     1515 Dr. Anderson's evaluation.   1636 I rechecked the patient and explained findings.         Interventions:  Medications   ipratropium - albuterol 0.5 mg/2.5 mg/3 mL (DUONEB) neb solution 3 mL (3 mLs Nebulization Given 6/8/22 1543)         Disposition:  The patient was discharged to home.     Impression & Plan   Medical Decision  Making:  Asha is a 87 year old male with history of type 2 diabetes, hypertension, and hyperlipidemia, who presents with cough.  Please see HPI and physical exam for full details.  Differential diagnosis included COVID, influenza, pneumonia, sepsis, viral URI, bronchitis, PE, ACS, and others.  Patient has a very reassuring history and physical exam.  He has not been febrile during his time here, however he did take Tylenol prior to arrival.  He is not hypoxic or tachycardic.  Chest x-ray was obtained, and this does show basilar opacities, radiology comments that this is likely atelectasis, however in the correct setting of fever and cough, this could represent an early developing pneumonia.  COVID and influenza were thankfully negative. EKG is nonconcerning for ischemia. Troponin is 7, and following greater than 6 hours of symptoms, according to [facility] pathway for high-sensitivity troponin, patient can be ruled out for myocardial injury.  Remainder of basic laboratory evaluation is unremarkable.  Patient received a DuoNeb in the department with improvement in his breathing.  At this time, patient is stable for discharge to home.  Due to patient's age and comorbidities as well as the rapid onset of his symptoms, I did provide prescriptions for Augmentin and azithromycin for presumed community-acquired pneumonia.  I also provided him with Tessalon Perles to aid with coughing.  Return precautions were discussed in detail.  All questions were answered.  Patient is discharged home in stable condition.     Diagnosis:    ICD-10-CM    1. Upper respiratory tract infection, unspecified type  J06.9        Discharge Medications:  New Prescriptions    AMOXICILLIN-CLAVULANATE (AUGMENTIN) 875-125 MG TABLET    Take 1 tablet by mouth 2 times daily for 7 days    AZITHROMYCIN (ZITHROMAX Z-MIKI) 250 MG TABLET    Two tablets on the first day, then one tablet daily for the next 4 days    BENZONATATE (TESSALON) 100 MG CAPSULE     Take 1 capsule (100 mg) by mouth 3 times daily as needed for cough       Scribe Disclosure:  I, Mickey Castillo, am serving as a scribe at 2:34 PM on 6/8/2022 to document services personally performed by Mariana Lipscomb PA-C, on behalf of Bc Anderson MD based on my observations and the provider's statements to me.          Mariana Lipscomb PA-C  06/08/22 9823

## 2022-06-09 ENCOUNTER — PATIENT OUTREACH (OUTPATIENT)
Dept: GERIATRIC MEDICINE | Facility: CLINIC | Age: 87
End: 2022-06-09
Payer: COMMERCIAL

## 2022-06-09 NOTE — PROGRESS NOTES
[facility] Care Coordination Contact  CC received notification of Emergency Room visit.  ER visit occurred on 06/08/2022 at [facility] with Dx of Pneumonia of both lower lobes.    CC contacted member and adult daughter   and reviewed discharge summary.  Member has a follow-up appointment with PCP: No: Offered Assistance with setting up a follow up appointment   No future appointments.  Member has had a change in condition: No  New referrals placed: No  Home Visit Needed: No  Care plan reviewed.  PCP notified of ED visit via EMR.  Daphne Ochoa RN BSN PHN      [facility]  Care Coordinator  Phone:   [phone]  Fax:       [phone]

## 2022-06-12 ASSESSMENT — PATIENT HEALTH QUESTIONNAIRE - PHQ9: SUM OF ALL RESPONSES TO PHQ QUESTIONS 1-9: 6

## 2022-08-28 ENCOUNTER — PATIENT OUTREACH (OUTPATIENT)
Dept: GERIATRIC MEDICINE | Facility: CLINIC | Age: 87
End: 2022-08-28

## 2022-08-29 NOTE — PROGRESS NOTES
[facility] Care Coordination Contact    No call to member.  [employer] Regulatory Update.  Daphne Ochoa RN BSN PHN      [facility]  Care Coordinator  Phone:   [phone]  Fax:       [phone]

## 2022-10-19 DIAGNOSIS — E78.5 HYPERLIPIDEMIA LDL GOAL <100: Chronic | ICD-10-CM

## 2022-10-19 DIAGNOSIS — R39.11 HESITANCY OF MICTURITION: ICD-10-CM

## 2022-10-19 DIAGNOSIS — I10 HYPERTENSION GOAL BP (BLOOD PRESSURE) < 140/80: ICD-10-CM

## 2022-10-19 DIAGNOSIS — E55.9 VITAMIN D DEFICIENCY: Primary | ICD-10-CM

## 2022-10-19 RX ORDER — METOPROLOL SUCCINATE 50 MG/1
50 TABLET, EXTENDED RELEASE ORAL DAILY
Qty: 90 TABLET | Refills: 0 | Status: SHIPPED | OUTPATIENT
Start: 2022-10-19 | End: 2022-11-29

## 2022-10-19 RX ORDER — VITAMIN B COMPLEX
50 TABLET ORAL DAILY
Qty: 180 TABLET | Refills: 0 | Status: SHIPPED | OUTPATIENT
Start: 2022-10-19 | End: 2023-11-01

## 2022-10-19 RX ORDER — ATORVASTATIN CALCIUM 20 MG/1
20 TABLET, FILM COATED ORAL DAILY
Qty: 90 TABLET | Refills: 0 | Status: SHIPPED | OUTPATIENT
Start: 2022-10-19 | End: 2022-11-29

## 2022-10-19 RX ORDER — FINASTERIDE 5 MG/1
5 TABLET, FILM COATED ORAL DAILY
Qty: 90 TABLET | Refills: 0 | Status: SHIPPED | OUTPATIENT
Start: 2022-10-19 | End: 2022-11-29

## 2022-10-19 NOTE — TELEPHONE ENCOUNTER
Dr. Lowe- Please review, sign if agree and may close encounter.    Call received from Ruben thomas.  Consent to communicate on file.    Per Ruben:  1. [facility] does not have refills for Metoprolol succinate ER, Atorvastatin nor Finasteride   2. Patient also needs Vitamin D3 50 mcg daily refilled    Pharmacy confirmed with Ruben.    Writer informed Ruben writer will resend orders for the first three medications, as they were ordered for a 6 month supply in June 2022.    Writer will ask Dr. Lowe to refill Vitamin D3, but this is not active on patient's medication list and writer does not see any previous orders from Dr. Lowe for this medication.      Ruben verbalized understanding and in agreement with plan.  She informed writer she is unsure who has been prescribing Vitamin D3 for patient, but patient does take it daily.    Thank you!  MALIK UngerN, RN-[facility]

## 2022-11-07 ENCOUNTER — TRANSFERRED RECORDS (OUTPATIENT)
Dept: HEALTH INFORMATION MANAGEMENT | Facility: CLINIC | Age: 87
End: 2022-11-07

## 2022-11-09 DIAGNOSIS — Z71.89 OTHER SPECIFIED COUNSELING: Primary | Chronic | ICD-10-CM

## 2022-11-29 ENCOUNTER — OFFICE VISIT (OUTPATIENT)
Dept: FAMILY MEDICINE | Facility: CLINIC | Age: 87
End: 2022-11-29
Payer: COMMERCIAL

## 2022-11-29 VITALS
HEIGHT: 64 IN | RESPIRATION RATE: 15 BRPM | BODY MASS INDEX: 26.82 KG/M2 | OXYGEN SATURATION: 98 % | WEIGHT: 157.12 LBS | HEART RATE: 61 BPM | TEMPERATURE: 98.2 F | SYSTOLIC BLOOD PRESSURE: 124 MMHG | DIASTOLIC BLOOD PRESSURE: 60 MMHG

## 2022-11-29 DIAGNOSIS — I10 HYPERTENSION GOAL BP (BLOOD PRESSURE) < 140/80: ICD-10-CM

## 2022-11-29 DIAGNOSIS — E11.9 TYPE 2 DIABETES MELLITUS WITHOUT COMPLICATION, WITHOUT LONG-TERM CURRENT USE OF INSULIN (H): Primary | ICD-10-CM

## 2022-11-29 DIAGNOSIS — E78.5 HYPERLIPIDEMIA LDL GOAL <100: Chronic | ICD-10-CM

## 2022-11-29 DIAGNOSIS — H72.92 PERFORATED EARDRUM, LEFT: ICD-10-CM

## 2022-11-29 DIAGNOSIS — R30.0 DYSURIA: ICD-10-CM

## 2022-11-29 DIAGNOSIS — R39.11 HESITANCY OF MICTURITION: ICD-10-CM

## 2022-11-29 LAB — HBA1C MFR BLD: 5.8 % (ref 0–5.6)

## 2022-11-29 PROCEDURE — 99214 OFFICE O/P EST MOD 30 MIN: CPT | Performed by: FAMILY MEDICINE

## 2022-11-29 PROCEDURE — 83036 HEMOGLOBIN GLYCOSYLATED A1C: CPT | Performed by: FAMILY MEDICINE

## 2022-11-29 PROCEDURE — 36415 COLL VENOUS BLD VENIPUNCTURE: CPT | Performed by: FAMILY MEDICINE

## 2022-11-29 RX ORDER — TAMSULOSIN HYDROCHLORIDE 0.4 MG/1
CAPSULE ORAL
Qty: 90 CAPSULE | Refills: 2 | Status: SHIPPED | OUTPATIENT
Start: 2022-11-29 | End: 2023-04-24

## 2022-11-29 RX ORDER — ATORVASTATIN CALCIUM 20 MG/1
20 TABLET, FILM COATED ORAL DAILY
Qty: 90 TABLET | Refills: 1 | Status: SHIPPED | OUTPATIENT
Start: 2023-01-02 | End: 2023-04-24

## 2022-11-29 RX ORDER — FINASTERIDE 5 MG/1
5 TABLET, FILM COATED ORAL DAILY
Qty: 90 TABLET | Refills: 1 | Status: SHIPPED | OUTPATIENT
Start: 2023-01-02 | End: 2023-04-24

## 2022-11-29 RX ORDER — METOPROLOL SUCCINATE 50 MG/1
50 TABLET, EXTENDED RELEASE ORAL DAILY
Qty: 90 TABLET | Refills: 1 | Status: SHIPPED | OUTPATIENT
Start: 2023-01-02 | End: 2023-04-24

## 2022-11-29 NOTE — PROGRESS NOTES
"  Assessment & Plan     Type 2 diabetes mellitus without complication, without long-term current use of insulin (H)  Under good control. Diet controlled. Has diet form from [facility] - filled out.   - HEMOGLOBIN A1C; Future  - HEMOGLOBIN A1C    Hyperlipidemia LDL goal <100   Patient is tolerating current medication without any major side effects of concerns and current dose seems reasonable too.  Current medication regime is effective. Continue current treatment without any changes.   - atorvastatin (LIPITOR) 20 MG tablet; Take 1 tablet (20 mg) by mouth daily    Hesitancy of micturition  Patient is tolerating current medication without any major side effects of concerns and current dose seems reasonable too.  Current medication regime is effective. Continue current treatment without any changes.   - finasteride (PROSCAR) 5 MG tablet; Take 1 tablet (5 mg) by mouth daily  - tamsulosin (FLOMAX) 0.4 MG capsule; TAKE 1 CAPSULE BY MOUTH EVERY DAY    Hypertension goal BP (blood pressure) < 140/80  Patient is tolerating current medication without any major side effects of concerns and current dose seems reasonable too.  Current medication regime is effective. Continue current treatment without any changes.   - metoprolol succinate ER (TOPROL XL) 50 MG 24 hr tablet; Take 1 tablet (50 mg) by mouth daily    Dysuria  Patient is tolerating current medication without any major side effects of concerns and current dose seems reasonable too.  Current medication regime is effective. Continue current treatment without any changes.   - tamsulosin (FLOMAX) 0.4 MG capsule; TAKE 1 CAPSULE BY MOUTH EVERY DAY    Perforated eardrum, left  Persistent with left ear ringing and muffled hearing - will refer him to ENT. Avoid qtips.   - Adult ENT  Referral; Future             BMI:   Estimated body mass index is 26.8 kg/m  as calculated from the following:    Height as of this encounter: 1.631 m (5' 4.2\").    Weight as of this encounter: " "71.3 kg (157 lb 1.9 oz).           Return in about 6 months (around 5/29/2023).     Samuel Lowe MD, MD  [facility]    Terri Barry is a 87 year old, presenting for the following health issues:  forms and ear pain left side       History of Present Illness       Reason for visit:  Ear pen  Symptom onset:  3-7 days ago  Symptom intensity:  Moderate  Symptom progression:  Worsening  Had these symptoms before:  Yes  Has tried/received treatment for these symptoms:  No    He eats 2-3 servings of fruits and vegetables daily.He consumes 3 sweetened beverage(s) daily.He exercises with enough effort to increase his heart rate 30 to 60 minutes per day.  He exercises with enough effort to increase his heart rate 4 days per week.   He is taking medications regularly.     Left ear makes whooshing sound.   Used phone . Here with his daughter.           Review of Systems         Objective    /60 (BP Location: Left arm, Patient Position: Sitting, Cuff Size: Adult Regular)   Pulse 61   Temp 98.2  F (36.8  C) (Temporal)   Resp 15   Ht 1.631 m (5' 4.2\")   Wt 71.3 kg (157 lb 1.9 oz)   SpO2 98%   BMI 26.80 kg/m    Body mass index is 26.8 kg/m .  Physical Exam   Left TM not fully visualized du to ear wax but TM shows perforation.                       "

## 2022-11-29 NOTE — LETTER
December 6, 2022      Asha Sanford  [address]  [place] [address]        Dear ,    We are writing to inform you of your test results.    Blood sugars are in great control.     Samuel Lowe MD   [facility]      Resulted Orders   HEMOGLOBIN A1C   Result Value Ref Range    Hemoglobin A1C 5.8 (H) 0.0 - 5.6 %      Comment:      Normal <5.7%   Prediabetes 5.7-6.4%    Diabetes 6.5% or higher     Note: Adopted from ADA consensus guidelines.       If you have any questions or concerns, please call the clinic at the number listed above.       Sincerely,      KELIN/ Samuel Lowe MD

## 2022-11-30 ENCOUNTER — PATIENT OUTREACH (OUTPATIENT)
Dept: GERIATRIC MEDICINE | Facility: CLINIC | Age: 87
End: 2022-11-30

## 2022-11-30 NOTE — PROGRESS NOTES
"[facility] Care Coordination Contact      [facility] Six-Month Telephone Assessment    6 month telephone assessment completed on 11/30/2022.    ER visits: No  Hospitalizations: No  TCU stays: No  Significant health status changes: n/a  Falls/Injuries: No  ADL/IADL changes: No  Changes in services: No    Also, followed up on referral on 11/09/2022 due to, \"[facility] referral 11/9/22. Noted that patient is looking for additional resources for support with ADLs and possible PCA services.\"    However, no ADL changes as well as no CIC so no need for PCA reassessment.      Caregiver Assessment follow up:  N/A    Goals: See POC in chart for goal progress documentation.      Will see member in 6 months for an annual health risk assessment.   Encouraged member to call CC with any questions or concerns in the meantime.     Daphne Ochoa, RN BSN PHN      [facility]  Care Coordinator  Phone:   [phone]  Fax:       [phone]              "

## 2022-12-02 NOTE — TELEPHONE ENCOUNTER
FUTURE VISIT INFORMATION      FUTURE VISIT INFORMATION:    Date: 12/6/22    Time: 8:40am    [place] [facility] [place] INFORMATION:    Referring provider:  Samuel Lowe MD    Referring providers clinic:  [facility]    Reason for visit/diagnosis  ENT w/ Lund @ 8:40am    RECORDS REQUESTED FROM:       Clinic name Comments Records Status Imaging Status   [facility] 11/29/22 OV note from Samuel Lowe MD Epic

## 2022-12-02 NOTE — PROGRESS NOTES
Neurotology Clinic      Name: Asha Sanford  MRN: [id number]  Age: 87 year old  : 1935  Referring provider: Samuel Lowe  2022      Chief Complaint:   Consultation    History of Present Illness:   Asha Sanford is a 87 year old male with a history of type II diabetes and hypertension who presents for consultation regarding left TM perforation.  Consultation was requested by Samuel Lowe. He is here with his grand daughter.     He started noticing left side clicking and high pitched tinnitus about 1 month ago. Tinnitus comes and goes and the loudness of it changes throughout the day. Denies pulsatile tinnitus or tinnitus in his right ear. Denies otalgia, otorrhea, hearing loss, dizziness, or facial weakness/twitching, numbness. He denies prior ear surgeries, trauma, or exposure to loud noise. He does not have any difficulty with communication due to hearing problems.     SH: [place]oved to the  [place] years ago     Review of Systems:   Pertinent items are noted in HPI or as in patient entered ROS below, remainder of complete ROS is negative.     Active Medications:     Current Outpatient Medications:      ACE/ARB NOT PRESCRIBED, INTENTIONAL,, ACE & ARB not prescribed due to Intolerance and Other: hypokalemia x 2 episodes, Disp: , Rfl:      acetaminophen (MAPAP) 500 MG tablet, TAKE 1 TABLET BY MOUTH EVERY 6 HOURS AS NEEDED, Disp: 100 tablet, Rfl: 5     albuterol (PROAIR HFA/PROVENTIL HFA/VENTOLIN HFA) 108 (90 Base) MCG/ACT inhaler, Inhale 2 puffs into the lungs every 6 hours, Disp: 18 g, Rfl: 0     [START ON 2023] atorvastatin (LIPITOR) 20 MG tablet, Take 1 tablet (20 mg) by mouth daily, Disp: 90 tablet, Rfl: 1     carbidopa-levodopa (SINEMET)  MG tablet, TAKE 1/2 TABLET BY MOUTH THREE TIMES DAILY, Disp: 135 tablet, Rfl: 0     [START ON 2023] finasteride (PROSCAR) 5 MG tablet, Take 1 tablet (5 mg) by mouth daily, Disp: 90 tablet, Rfl: 1     [START ON 2023]  metoprolol succinate ER (TOPROL XL) 50 MG 24 hr tablet, Take 1 tablet (50 mg) by mouth daily, Disp: 90 tablet, Rfl: 1     omeprazole (PRILOSEC) 20 MG DR capsule, TAKE ONE CAPSULE BY MOUTH ONCE DAILY EVERY MORNING ONE-HALF HOUR BEFORE A MEAL, Disp: 90 capsule, Rfl: 3     polyethylene glycol-propylene glycol (SYSTANE) 0.4-0.3 % SOLN ophthalmic solution, Place 1 drop into both eyes 4 times daily as needed for dry eyes, Disp: 1 Bottle, Rfl: 11     polyvinyl alcohol (ARTIFICIAL TEARS) 1.4 % ophthalmic solution, Place 1 drop into both eyes as needed for dry eyes, Disp: 15 mL, Rfl: 11     tamsulosin (FLOMAX) 0.4 MG capsule, TAKE 1 CAPSULE BY MOUTH EVERY DAY, Disp: 90 capsule, Rfl: 2     vitamin D2 (ERGOCALCIFEROL) 11790 units (1250 mcg) capsule, TAKE 1 CAPSULE BY MOUTH EVERY WEEK, Disp: 12 capsule, Rfl: 11     Vitamin D3 (CHOLECALCIFEROL) 25 mcg (1000 units) tablet, Take 2 tablets (50 mcg) by mouth daily, Disp: 180 tablet, Rfl: 0      Allergies:   Aspirin      Past Medical History:  Past Medical History:   Diagnosis Date     Arthritis      Chronic pain of both knees 3/6/2014     Encounter for diabetic foot exam (H) 11/29/2018    Normal MONOFILAMENT TESTING  MILDLY WEEK PULSES BILATERAL      HTN (hypertension)      Hypercholesteremia      Migraine      Obesity 10/29/2012     Problem list name updated by automated process. Provider to review     Type 2 diabetes, HbA1C goal < 8% (H) 1/28/2014     UTI (urinary tract infection) 5/6/2015     Patient Active Problem List   Diagnosis     Dysuria     Essential and other specified forms of tremor     Abnormal involuntary movement     Lipoma     Cervical spondylosis without myelopathy     Degeneration of cervical intervertebral disc     Vitamin D deficiency     Memory loss     Insomnia     Gastroesophageal reflux disease without esophagitis     Hyperlipidemia LDL goal <100     Pain in joint involving lower leg     Hypertension goal BP (blood pressure) < 140/80     Type 2 diabetes  mellitus without complications (H)     Lumbago     Chronic pain of both knees     Headache     Nausea     Lung nodule seen on imaging study     Bladder stones     ACP (advance care planning)     Hyponatremia     Tension headache     Encounter for diabetic foot exam (H)        Past Surgical History:  Past Surgical History:   Procedure Laterality Date     CL AFF SURGICAL PATHOLOGY  7/1/2010     EYE SURGERY  2003    retinal detatch     SHOULDER SURGERY         Family History:   Family History   Problem Relation Age of Onset     Unknown/Adopted Mother      Unknown/Adopted Father          Social History:   Social History     Tobacco Use     Smoking status: Never     Smokeless tobacco: Never   Vaping Use     Vaping Use: Never used   Substance Use Topics     Alcohol use: No     Drug use: No        Physical Exam:   There were no vitals taken for this visit.       Constitutional:  The patient was accompanied by his grand daughter, well-groomed, and in no acute distress.     Skin: Normal:  warm and pink without rash   Neurologic: Alert and oriented x 3.  CN's III-XII within normal limits.  Voice normal.    Psychiatric: The patient's affect was calm, cooperative, and appropriate.     Communication:  Normal; communicates verbally, normal voice quality.   Respiratory: Breathing comfortably without stridor or exertion of accessory muscles.    Head/Face:  No lesions or scars. No sinus tenderness.    Salivary glands -  Normal size, no tenderness, swelling, or palpable masses   Eyes: Pupils were equal and reactive.  Extraocular movement intact.     Ears: Pinnae and tragus non-tender.          Otologic microscope exam:  Right ear: Ear canal clear. TM intact and middle ear aerated. TM is moderately retracted but no squamous debris or retraction pocket.   Left ear: Ear canal with moist crusts partially obstructing in medial canal. This was removed with suction. Posterior TM was retracted and initially was unable to see the depth. A  layer of superficial squamous debris was gently removed with suction. With valsalva, the entire retraction pocket popped and there was a pinpoint perforation with mucoid drainage around at the center of the pocket. This area around the perforation was erythematous and mildly thickened.       Audiogram:  AUDIOGRAM: He underwent an audiogram today. This demonstrated:        Right: Speech reception threshold is 30 dB with 100% word recognition. Tympanogram C type   Left: Speech reception threshold is 25 dB with 100% word recognition. 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 Exam showed bilateral eustachian tube dysfunction and retracted TM worse in his left ear. There was a small pinpoint perforation in left ear within the redundant posterior TM without cholesteatoma tracking into the middle ear. This is likely causing the tinnitus that he has been noticing in the left ear. He reports that his tinnitus improved after the cleaning. Given that his perforation is clean without recurrent drainage/infection and his age, it is a reasonable option to pursue routine cleaning without surgery. We will schedule a follow up appointment with our colleague Nicci Gutierres for the follow up visit in 4 to 6 months. We recommended floxin ear drops in left ear for 1 week. The patient expressed understanding and is in agreement with this plan.  All questions were answered.    Jessica Benitez MD MPH   Fellow Physician  Otology & Neurotology  [facility]     Tana Lund MD  Otology & Neurotology  [facility]  Anel Montenegro Disclosure:  I, Orion Mcgraw, prepared the chart for today's encounter.       I, Tana Lund MD, saw this patient with the resident/fellow and agree with the resident s findings and plan of care as documented in the resident s/fellow s note. I was present for the entire procedure.

## 2022-12-05 DIAGNOSIS — Z01.10 ENCOUNTER FOR HEARING TEST: Primary | ICD-10-CM

## 2022-12-06 ENCOUNTER — OFFICE VISIT (OUTPATIENT)
Dept: AUDIOLOGY | Facility: CLINIC | Age: 87
End: 2022-12-06
Attending: FAMILY MEDICINE
Payer: COMMERCIAL

## 2022-12-06 ENCOUNTER — OFFICE VISIT (OUTPATIENT)
Dept: OTOLARYNGOLOGY | Facility: CLINIC | Age: 87
End: 2022-12-06
Attending: FAMILY MEDICINE
Payer: COMMERCIAL

## 2022-12-06 ENCOUNTER — PRE VISIT (OUTPATIENT)
Dept: OTOLARYNGOLOGY | Facility: CLINIC | Age: 87
End: 2022-12-06

## 2022-12-06 VITALS
SYSTOLIC BLOOD PRESSURE: 180 MMHG | DIASTOLIC BLOOD PRESSURE: 76 MMHG | TEMPERATURE: 97.6 F | OXYGEN SATURATION: 96 % | WEIGHT: 155 LBS | HEART RATE: 60 BPM | BODY MASS INDEX: 27.46 KG/M2 | HEIGHT: 63 IN

## 2022-12-06 DIAGNOSIS — H90.6 MIXED HEARING LOSS, BILATERAL: Primary | ICD-10-CM

## 2022-12-06 DIAGNOSIS — H72.92 PERFORATED EARDRUM, LEFT: Primary | ICD-10-CM

## 2022-12-06 DIAGNOSIS — H61.22 IMPACTED CERUMEN OF LEFT EAR: ICD-10-CM

## 2022-12-06 PROCEDURE — 92557 COMPREHENSIVE HEARING TEST: CPT | Performed by: AUDIOLOGIST

## 2022-12-06 PROCEDURE — 92504 EAR MICROSCOPY EXAMINATION: CPT | Mod: GC | Performed by: OTOLARYNGOLOGY

## 2022-12-06 PROCEDURE — 99203 OFFICE O/P NEW LOW 30 MIN: CPT | Mod: 25 | Performed by: OTOLARYNGOLOGY

## 2022-12-06 PROCEDURE — 92550 TYMPANOMETRY & REFLEX THRESH: CPT | Performed by: AUDIOLOGIST

## 2022-12-06 RX ORDER — OFLOXACIN 3 MG/ML
4 SOLUTION AURICULAR (OTIC) DAILY
Qty: 5 ML | Refills: 0 | Status: SHIPPED | OUTPATIENT
Start: 2022-12-06 | End: 2022-12-13

## 2022-12-06 ASSESSMENT — PAIN SCALES - GENERAL: PAINLEVEL: NO PAIN (0)

## 2022-12-06 NOTE — NURSING NOTE
"Chief Complaint   Patient presents with     Consult     Blood pressure (!) 180/76, pulse 60, temperature 97.6  F (36.4  C), height 1.6 m (5' 3\"), weight 70.3 kg (155 lb), SpO2 96 %.    .  Shahab Vargas LPN    "

## 2022-12-06 NOTE — PROGRESS NOTES
AUDIOLOGY REPORT    SUMMARY: Audiology visit completed. See audiogram for results.      RECOMMENDATIONS: Follow-up with ENT.    Reena Bragg, [facility]  Licensed Audiologist  [place] #[id number]

## 2022-12-06 NOTE — LETTER
2022       RE: Asha Sanford  [address]  [address]  [facility] [address]     Dear Colleague,    Thank you for referring your patient, Asha Sanford, to the [facility] EAR NOSE AND THROAT CLINIC [place] at [facility]. Please see a copy of my visit note below.      Neurotology Clinic      Name: Asha Sanford  MRN: [id number]  Age: 87 year old  : 1935  Referring provider: Samuel Lowe  2022      Chief Complaint:   Consultation    History of Present Illness:   Asha Sanford is a 87 year old male with a history of type II diabetes and hypertension who presents for consultation regarding left TM perforation.  Consultation was requested by Samuel Lowe. He is here with his grand daughter.     He started noticing left side clicking and high pitched tinnitus about 1 month ago. Tinnitus comes and goes and the loudness of it changes throughout the day. Denies pulsatile tinnitus or tinnitus in his right ear. Denies otalgia, otorrhea, hearing loss, dizziness, or facial weakness/twitching, numbness. He denies prior ear surgeries, trauma, or exposure to loud noise. He does not have any difficulty with communication due to hearing problems.     SH: [place]     Review of Systems:   Pertinent items are noted in HPI or as in patient entered ROS below, remainder of complete ROS is negative.     Active Medications:     Current Outpatient Medications:      ACE/ARB NOT PRESCRIBED, INTENTIONAL,, ACE & ARB not prescribed due to Intolerance and Other: hypokalemia x 2 episodes, Disp: , Rfl:      acetaminophen (MAPAP) 500 MG tablet, TAKE 1 TABLET BY MOUTH EVERY 6 HOURS AS NEEDED, Disp: 100 tablet, Rfl: 5     albuterol (PROAIR HFA/PROVENTIL HFA/VENTOLIN HFA) 108 (90 Base) MCG/ACT inhaler, Inhale 2 puffs into the lungs every 6 hours, Disp: 18 g, Rfl: 0     [START ON 2023] atorvastatin  (LIPITOR) 20 MG tablet, Take 1 tablet (20 mg) by mouth daily, Disp: 90 tablet, Rfl: 1     carbidopa-levodopa (SINEMET)  MG tablet, TAKE 1/2 TABLET BY MOUTH THREE TIMES DAILY, Disp: 135 tablet, Rfl: 0     [START ON 1/2/2023] finasteride (PROSCAR) 5 MG tablet, Take 1 tablet (5 mg) by mouth daily, Disp: 90 tablet, Rfl: 1     [START ON 1/2/2023] metoprolol succinate ER (TOPROL XL) 50 MG 24 hr tablet, Take 1 tablet (50 mg) by mouth daily, Disp: 90 tablet, Rfl: 1     omeprazole (PRILOSEC) 20 MG DR capsule, TAKE ONE CAPSULE BY MOUTH ONCE DAILY EVERY MORNING ONE-HALF HOUR BEFORE A MEAL, Disp: 90 capsule, Rfl: 3     polyethylene glycol-propylene glycol (SYSTANE) 0.4-0.3 % SOLN ophthalmic solution, Place 1 drop into both eyes 4 times daily as needed for dry eyes, Disp: 1 Bottle, Rfl: 11     polyvinyl alcohol (ARTIFICIAL TEARS) 1.4 % ophthalmic solution, Place 1 drop into both eyes as needed for dry eyes, Disp: 15 mL, Rfl: 11     tamsulosin (FLOMAX) 0.4 MG capsule, TAKE 1 CAPSULE BY MOUTH EVERY DAY, Disp: 90 capsule, Rfl: 2     vitamin D2 (ERGOCALCIFEROL) 43171 units (1250 mcg) capsule, TAKE 1 CAPSULE BY MOUTH EVERY WEEK, Disp: 12 capsule, Rfl: 11     Vitamin D3 (CHOLECALCIFEROL) 25 mcg (1000 units) tablet, Take 2 tablets (50 mcg) by mouth daily, Disp: 180 tablet, Rfl: 0      Allergies:   Aspirin      Past Medical History:  Past Medical History:   Diagnosis Date     Arthritis      Chronic pain of both knees 3/6/2014     Encounter for diabetic foot exam (H) 11/29/2018    Normal MONOFILAMENT TESTING  MILDLY WEEK PULSES BILATERAL      HTN (hypertension)      Hypercholesteremia      Migraine      Obesity 10/29/2012     Problem list name updated by automated process. Provider to review     Type 2 diabetes, HbA1C goal < 8% (H) 1/28/2014     UTI (urinary tract infection) 5/6/2015     Patient Active Problem List   Diagnosis     Dysuria     Essential and other specified forms of tremor     Abnormal involuntary movement     Lipoma      Cervical spondylosis without myelopathy     Degeneration of cervical intervertebral disc     Vitamin D deficiency     Memory loss     Insomnia     Gastroesophageal reflux disease without esophagitis     Hyperlipidemia LDL goal <100     Pain in joint involving lower leg     Hypertension goal BP (blood pressure) < 140/80     Type 2 diabetes mellitus without complications (H)     Lumbago     Chronic pain of both knees     Headache     Nausea     Lung nodule seen on imaging study     Bladder stones     ACP (advance care planning)     Hyponatremia     Tension headache     Encounter for diabetic foot exam (H)        Past Surgical History:  Past Surgical History:   Procedure Laterality Date     CL AFF SURGICAL PATHOLOGY  7/1/2010     EYE SURGERY  2003    retinal detatch     SHOULDER SURGERY         Family History:   Family History   Problem Relation Age of Onset     Unknown/Adopted Mother      Unknown/Adopted Father          Social History:   Social History     Tobacco Use     Smoking status: Never     Smokeless tobacco: Never   Vaping Use     Vaping Use: Never used   Substance Use Topics     Alcohol use: No     Drug use: No        Physical Exam:   There were no vitals taken for this visit.       Constitutional:  The patient was accompanied by his grand daughter, well-groomed, and in no acute distress.     Skin: Normal:  warm and pink without rash   Neurologic: Alert and oriented x 3.  CN's III-XII within normal limits.  Voice normal.    Psychiatric: The patient's affect was calm, cooperative, and appropriate.     Communication:  Normal; communicates verbally, normal voice quality.   Respiratory: Breathing comfortably without stridor or exertion of accessory muscles.    Head/Face:  No lesions or scars. No sinus tenderness.    Salivary glands -  Normal size, no tenderness, swelling, or palpable masses   Eyes: Pupils were equal and reactive.  Extraocular movement intact.     Ears: Pinnae and tragus non-tender.           Otologic microscope exam:  Right ear: Ear canal clear. TM intact and middle ear aerated. TM is moderately retracted but no squamous debris or retraction pocket.   Left ear: Ear canal with moist crusts partially obstructing in medial canal. This was removed with suction. Posterior TM was retracted and initially was unable to see the depth. A layer of superficial squamous debris was gently removed with suction. With valsalva, the entire retraction pocket popped and there was a pinpoint perforation with mucoid drainage around at the center of the pocket. This area around the perforation was erythematous and mildly thickened.       Audiogram:  AUDIOGRAM: He underwent an audiogram today. This demonstrated:        Right: Speech reception threshold is 30 dB with 100% word recognition. Tympanogram C type   Left: Speech reception threshold is 25 dB with 100% word recognition. 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 Exam showed bilateral eustachian tube dysfunction and retracted TM worse in his left ear. There was a small pinpoint perforation in left ear within the redundant posterior TM without cholesteatoma tracking into the middle ear. This is likely causing the tinnitus that he has been noticing in the left ear. He reports that his tinnitus improved after the cleaning. Given that his perforation is clean without recurrent drainage/infection and his age, it is a reasonable option to pursue routine cleaning without surgery. We will schedule a follow up appointment with our colleague Nicci Gutierres for the follow  up visit in 4 to 6 months. We recommended floxin ear drops in left ear for 1 week. The patient expressed understanding and is in agreement with this plan.  All questions were answered.    Jessica Benitez MD MPH   Fellow Physician  Otology & Neurotology  [facility]     Tana Lund MD  Otology & Neurotology  [facility]       Scribe Disclosure:  I, Orion Mcgraw, prepared the chart for today's encounter.       I, Tana Lund MD, saw this patient with the resident/fellow and agree with the resident s findings and plan of care as documented in the resident s/fellow s note. I was present for the entire procedure.        Again, thank you for allowing me to participate in the care of your patient.      Sincerely,    Tana Lund MD

## 2022-12-06 NOTE — PATIENT INSTRUCTIONS
You were seen in the ENT Clinic today by Dr. Lund. If you have any questions or concerns after your appointment, please contact us (see below)      2.   Please return to clinic to see our clinic NP in about 4 months.  3. Floxin drops have been sent to your pharmacy, please use as directed.         How to Contact Us:  Send a [employer] message to your provider. Our team will respond to you via [employer]. Occasionally, we will need to call you to get further information.  For urgent matters (Monday-Friday), call the ENT Clinic: [phone] and speak with a call center team member - they will route your call appropriately.   If you'd like to speak directly with a nurse, please find our contact information below. We do our best to check voicemail frequently throughout the day, and will work to call you back within 1-2 days. For urgent matters, please use the general clinic phone numbers listed above.      Jeanine DIAZ RN  ENT RN Care Coordinator  Direct: [phone]    Jud TORREZ LPN  Direct: [phone]

## 2023-03-09 NOTE — LETTER
February 20, 2020    Asha Sanford  [address]  [address]  [place] [address]    Dear Mp Barry cares about your health and your health plan.  I have reviewed your medical conditions, medication list and lab results, and am making recommendations based on this review to better manage your health.    You are in particular need of attention regarding:  -Diabetes  -High Blood Pressure    I am recommending that you:     -schedule a FOLLOWUP OFFICE APPOINTMENT with me.  I will recheck your: A1c test.      Please call us at the [place] location:  [phone] or use [employer] to address the above recommendations.     Thank you for trusting [facility].  We appreciate the opportunity to serve you and look forward to supporting your healthcare in the future.    If you have (or plan to have) any of these tests done at a facility other than a [facility] or a [facility], please have the results sent to the [facility] location noted above.      Best Regards,    Edwardo Moser Jr MD     Render Risk Assessment In Note?: yes Detail Level: Detailed Additional Notes: Reassured the nature of this condition

## 2023-03-14 ENCOUNTER — PATIENT OUTREACH (OUTPATIENT)
Dept: GERIATRIC MEDICINE | Facility: CLINIC | Age: 88
End: 2023-03-14
Payer: COMMERCIAL

## 2023-03-14 NOTE — PROGRESS NOTES
Encounter opened due to Regulatory [employer] Update to close FVP Program.    Brook Henderson  Care Management Specialist  [facility]  [phone]

## 2023-03-14 NOTE — PROGRESS NOTES
Encounter opened due to Regulatory [employer] Update to open FVP Program.    Brook Henderson  Care Management Specialist  [facility]  [phone]

## 2023-03-15 ENCOUNTER — PATIENT OUTREACH (OUTPATIENT)
Dept: GERIATRIC MEDICINE | Facility: CLINIC | Age: 88
End: 2023-03-15
Payer: COMMERCIAL

## 2023-03-15 ASSESSMENT — ACTIVITIES OF DAILY LIVING (ADL): DEPENDENT_IADLS:: CLEANING;COOKING;LAUNDRY;SHOPPING;MEAL PREPARATION;MEDICATION MANAGEMENT;TRANSPORTATION

## 2023-03-15 NOTE — PROGRESS NOTES
[facility] Care Coordination Contact    [facility] Home Visit Assessment     Home visit for Health Risk Assessment with Asha Snaford completed on March 15, 2023    Type of residence:: Apartment  Current living arrangement:: I live alone     Assessment completed with:: Patient    Current Care Plan  Member currently receiving the following home care services:   N/A  Member currently receiving the following community resources: Day Care, PCA, monthly incontinent products and Homemaking.      Medication Review  Medication reconciliation completed in Epic: Yes  Medication set-up & administration: Family/informal caregiver sets up daily.  Family caregiver administers medications.  Medication Risk Assessment Medication (1 or more, place referral to MTM): Taking 1 or more high-risk medications for adults >65 years  MTM Referral Placed: No: Reviewed    Mental/Behavioral Health   Depression Screenin     Mental health DX:: Yes (Insomnia)        ADL/IADL Dependencies:   Dependent ADLs:: Ambulation-walker, Bathing, Dressing, Grooming, Transfers  Dependent IADLs:: Cleaning, Cooking, Laundry, Shopping, Meal Preparation, Medication Management, Transportation    [facility] Health Plan sponsored benefits: Shared information re: gym memberships, ASA, Calcium +D.    PCA Assessment completed at visit: Yes Annual PCA assessment indicated 14 units per day of PCA. This is the same as the previous assessment.     Elderly Waiver Eligibility: Yes-will continue on EW    Care Plan & Recommendations:  Member to continue current POC including Day Care, PCA, monthly incontinent products and Homemaking.    See CC for detailed assessment information.    Follow-Up Plan: Member informed of future contact, plan to f/u with member with a 6 month telephone assessment.  Contact information shared with member and family, encouraged member to call with any questions or concerns at any time.    [place] care continuum providers: Please  see Snapshot and Care Management Flowsheets for Specific details of care plan.    This CC note routed to PCP.    Daphne Ochoa RN BSN N      [facility]  Care Coordinator  Phone:   [phone]  Fax:       [phone]

## 2023-03-31 ENCOUNTER — PATIENT OUTREACH (OUTPATIENT)
Dept: GERIATRIC MEDICINE | Facility: CLINIC | Age: 88
End: 2023-03-31
Payer: COMMERCIAL

## 2023-03-31 DIAGNOSIS — E78.5 HYPERLIPIDEMIA LDL GOAL <100: Chronic | ICD-10-CM

## 2023-03-31 RX ORDER — ATORVASTATIN CALCIUM 20 MG/1
20 TABLET, FILM COATED ORAL DAILY
Qty: 90 TABLET | Refills: 1 | Status: CANCELLED | OUTPATIENT
Start: 2023-03-31

## 2023-03-31 NOTE — LETTER
March 31, 2023      ALFREDO ALEMAN  [address]  [place] [facility] [place] [address]      Dear Alfredo:    At [facility], we re dedicated to improving your health and wellness. Enclosed is the Care Plan developed with you on 3/15/23. Please review the Care Plan carefully.    As a reminder, during your visit we talked about:    Ways to manage your physical and mental health    Using health care to maintain and improve your health     Your preventive care needs     Remember to contact your care coordinator if you:    Are hospitalized, or plan to be hospitalized     Have a fall      Have a change in your physical or mental health    Need help finding support or services    If you have questions, or don t agree with your Care Plan, call me at [phone]. You can also call me if your needs change. TTY users, call the [place] [employer] at (641) or 1-495.844.8316 (pdhvzo-hb-vbeiij relay service).    Sincerely,    CRISTY Middleton, RN, PHN    E-mail: [email or web address]  Phone: [phone]      [facility] ([facility]) is a health plan that contracts with both Medicare and the [place] Medical Assistance (Medicaid) program to provide benefits of both programs to enrollees. Enrollment in [facility] depends on contract renewal.    [id number] accepted    [id number] (07/2022)

## 2023-03-31 NOTE — PROGRESS NOTES
[facility] Care Coordination Contact  Task received 3/31/23.    Received after visit chart from care coordinator.  Completed following tasks: Mailed copy of care plan to client and Mailed Safe Medication Disposal .    Provider Signature - No POC Shared:  Member indicates that they do not want their POC shared with any EW providers.    [employer]:  Emailed completed PCA assessment to [employer].  Faxed copy of PCA assessment to PCA Agency and mailed copy to member.  Faxed MD Communication to PCP.     Will send ADC and HMK to health plan.    Michael Madrid  Care Management Specialist  [facility]  [phone]

## 2023-03-31 NOTE — TELEPHONE ENCOUNTER
Medication Question or Refill    Contacts       Type Contact Phone/Fax    03/31/2023 09:47 AM CDT Phone (Incoming) [id number] - [place], [place] - [address] (Pharmacy) [phone]          What medication are you calling about (include dose and sig)?: Atorvastatin 20 mg    Preferred Pharmacy:   [id number] [place], [place] - [address]  [address] [place] [address]  Phone: [phone] Fax: [phone]      Controlled Substance Agreement on file:   CSA -- Patient Level:    CSA: None found at the patient level.       Who prescribed the medication?: Dr. Lowe    Do you need a refill? Yes    When did you use the medication last? A few days ago    Patient offered an appointment? No    Do you have any questions or concerns?  Yes: Patient is completely out of the medication

## 2023-03-31 NOTE — TELEPHONE ENCOUNTER
Spoke with pharmacy and refill is being processed.    Ginny AGUILAR RN  [facility]

## 2023-04-04 ENCOUNTER — PATIENT OUTREACH (OUTPATIENT)
Dept: GERIATRIC MEDICINE | Facility: CLINIC | Age: 88
End: 2023-04-04
Payer: COMMERCIAL

## 2023-04-04 NOTE — PROGRESS NOTES
[facility] Care Coordination Contact    Completed following tasks: Updated services in Database and Submitted referrals/auths for ADC with [facility] plus transportation, and [facility]  Care Management Specialist  [facility]  [phone]

## 2023-04-18 ENCOUNTER — OFFICE VISIT (OUTPATIENT)
Dept: OTOLARYNGOLOGY | Facility: CLINIC | Age: 88
End: 2023-04-18
Payer: COMMERCIAL

## 2023-04-18 VITALS
HEART RATE: 55 BPM | DIASTOLIC BLOOD PRESSURE: 66 MMHG | BODY MASS INDEX: 26.75 KG/M2 | HEIGHT: 63 IN | SYSTOLIC BLOOD PRESSURE: 140 MMHG | OXYGEN SATURATION: 96 % | WEIGHT: 151 LBS

## 2023-04-18 DIAGNOSIS — H69.92 CHRONIC EUSTACHIAN TUBE DYSFUNCTION, LEFT: Primary | ICD-10-CM

## 2023-04-18 PROCEDURE — 99202 OFFICE O/P NEW SF 15 MIN: CPT | Mod: 25 | Performed by: REGISTERED NURSE

## 2023-04-18 PROCEDURE — 92504 EAR MICROSCOPY EXAMINATION: CPT | Performed by: REGISTERED NURSE

## 2023-04-18 ASSESSMENT — PAIN SCALES - GENERAL: PAINLEVEL: NO PAIN (0)

## 2023-04-18 NOTE — PROGRESS NOTES
Otolaryngology Clinic  April 18, 2023    HPI:  Asha Sanford is here for a recheck of their left ear. Patient's history is obtained with assistance of a Finnish . Last seen in clinic 12/2/22 by Dr. Lund for a left TM perforation. Patient was found to have a pinpoint TM perforation in the posterior TM. At that time, patient was experiencing left tinnitus that resolved with ear cleaning by Dr. Lund. Dr. Lund recommended follow up in 4-6 months for recheck of ear.    Today, patient returns for recheck. No concerns or ear symptoms today. Patient denies any otalgia, otorrhea, dizziness, tinnitus, changes in hearing.      Otologic microscope exam:    Patient's ear pathology required use of the binocular microscope for the purpose of cleaning and improving visualization in order to assure a more accurate diagnostic evaluation.     Left ear was also examined under the microscope. Crusting along posterior canal onto posterior TM cleaned with right angle hook and alligator forceps. Posterior TM retraction that popped with autoinsufflation. There is no visible perforation in pocket. Middle ear space is well aerated.     Assessment and Plan:  1. Chronic Eustachian tube dysfunction, left  The patient presents for recheck of the left ear. Retraction visualized on microscope exam today that resolved with autoinsufflation. There is no perforation visualized on today's exam. Patient has no ear concerns today.  Continue to monitor ear symptoms at this time.     Patient will follow up as needed for any new ear pain, drainage, or tinnitus.     Nicci Gutierres DNP, APRN, CNP  Otolaryngology  Head & Neck Surgery  [phone]    20 minutes spent on the date of the encounter doing chart review, history and exam, documentation and further activities per the note excluding time spent examining and cleaning ear under microscopy today.

## 2023-04-18 NOTE — LETTER
4/18/2023       RE: Asha Sanford  [address]  [address]  [facility] [address]     Dear Colleague,    Thank you for referring your patient, Asha Sanford, to the [facility] EAR NOSE AND THROAT CLINIC [place] [facility]. Please see a copy of my visit note below.      Otolaryngology Clinic  April 18, 2023    HPI:  Asha Sanford is here for a recheck of their left ear. Patient's history is obtained with assistance of a [place] . Last seen in clinic 12/2/22 by Dr. Lund for a left TM perforation. Patient was found to have a pinpoint TM perforation in the posterior TM. At that time, patient was experiencing left tinnitus that resolved with ear cleaning by Dr. Lund. Dr. Lund recommended follow up in 4-6 months for recheck of ear.    Today, patient returns for recheck. No concerns or ear symptoms today. Patient denies any otalgia, otorrhea, dizziness, tinnitus, changes in hearing.      Otologic microscope exam:    Patient's ear pathology required use of the binocular microscope for the purpose of cleaning and improving visualization in order to assure a more accurate diagnostic evaluation.     Left ear was also examined under the microscope. Crusting along posterior canal onto posterior TM cleaned with right angle hook and alligator forceps. Posterior TM retraction that popped with autoinsufflation. There is no visible perforation in pocket. Middle ear space is well aerated.     Assessment and Plan:  1. Chronic Eustachian tube dysfunction, left  The patient presents for recheck of the left ear. Retraction visualized on microscope exam today that resolved with autoinsufflation. There is no perforation visualized on today's exam. Patient has no ear concerns today.  Continue to monitor ear symptoms at this time.     Patient will follow up as needed for any new ear pain, drainage, or tinnitus.     Nicci Gutierres DNP, APRN,  CNP  Otolaryngology  Head & Neck Surgery  [phone]    20 minutes spent on the date of the encounter doing chart review, history and exam, documentation and further activities per the note excluding time spent examining and cleaning ear under microscopy today.      Again, thank you for allowing me to participate in the care of your patient.      Sincerely,    Krista Gutierres NP

## 2023-04-24 ENCOUNTER — OFFICE VISIT (OUTPATIENT)
Dept: FAMILY MEDICINE | Facility: CLINIC | Age: 88
End: 2023-04-24
Payer: COMMERCIAL

## 2023-04-24 VITALS
TEMPERATURE: 97.8 F | WEIGHT: 149 LBS | HEIGHT: 64 IN | RESPIRATION RATE: 16 BRPM | OXYGEN SATURATION: 99 % | BODY MASS INDEX: 25.44 KG/M2 | SYSTOLIC BLOOD PRESSURE: 164 MMHG | HEART RATE: 55 BPM | DIASTOLIC BLOOD PRESSURE: 72 MMHG

## 2023-04-24 DIAGNOSIS — G89.29 CHRONIC LOW BACK PAIN WITH SCIATICA, SCIATICA LATERALITY UNSPECIFIED, UNSPECIFIED BACK PAIN LATERALITY: Chronic | ICD-10-CM

## 2023-04-24 DIAGNOSIS — I10 HYPERTENSION GOAL BP (BLOOD PRESSURE) < 140/80: ICD-10-CM

## 2023-04-24 DIAGNOSIS — M54.40 CHRONIC LOW BACK PAIN WITH SCIATICA, SCIATICA LATERALITY UNSPECIFIED, UNSPECIFIED BACK PAIN LATERALITY: Chronic | ICD-10-CM

## 2023-04-24 DIAGNOSIS — R39.11 HESITANCY OF MICTURITION: ICD-10-CM

## 2023-04-24 DIAGNOSIS — E11.9 TYPE 2 DIABETES MELLITUS WITHOUT COMPLICATION, WITHOUT LONG-TERM CURRENT USE OF INSULIN (H): Primary | Chronic | ICD-10-CM

## 2023-04-24 DIAGNOSIS — Z11.1 SCREENING EXAMINATION FOR PULMONARY TUBERCULOSIS: ICD-10-CM

## 2023-04-24 DIAGNOSIS — E78.5 HYPERLIPIDEMIA LDL GOAL <100: Chronic | ICD-10-CM

## 2023-04-24 LAB
ALBUMIN SERPL BCG-MCNC: 4.1 G/DL (ref 3.5–5.2)
ALP SERPL-CCNC: 53 U/L (ref 40–129)
ALT SERPL W P-5'-P-CCNC: 8 U/L (ref 10–50)
ANION GAP SERPL CALCULATED.3IONS-SCNC: 10 MMOL/L (ref 7–15)
AST SERPL W P-5'-P-CCNC: 19 U/L (ref 10–50)
BILIRUB SERPL-MCNC: 0.5 MG/DL
BUN SERPL-MCNC: 12 MG/DL (ref 8–23)
CALCIUM SERPL-MCNC: 9.7 MG/DL (ref 8.8–10.2)
CHLORIDE SERPL-SCNC: 103 MMOL/L (ref 98–107)
CHOLEST SERPL-MCNC: 142 MG/DL
CREAT SERPL-MCNC: 1.12 MG/DL (ref 0.67–1.17)
CREAT UR-MCNC: 110 MG/DL
DEPRECATED HCO3 PLAS-SCNC: 27 MMOL/L (ref 22–29)
GFR SERPL CREATININE-BSD FRML MDRD: 64 ML/MIN/1.73M2
GLUCOSE SERPL-MCNC: 87 MG/DL (ref 70–99)
HBA1C MFR BLD: 5.5 % (ref 0–5.6)
HDLC SERPL-MCNC: 53 MG/DL
LDLC SERPL CALC-MCNC: 73 MG/DL
MICROALBUMIN UR-MCNC: 31.5 MG/L
MICROALBUMIN/CREAT UR: 28.64 MG/G CR (ref 0–17)
NONHDLC SERPL-MCNC: 89 MG/DL
POTASSIUM SERPL-SCNC: 4.4 MMOL/L (ref 3.4–5.3)
PROT SERPL-MCNC: 6.7 G/DL (ref 6.4–8.3)
SODIUM SERPL-SCNC: 140 MMOL/L (ref 136–145)
TRIGL SERPL-MCNC: 79 MG/DL

## 2023-04-24 PROCEDURE — 82043 UR ALBUMIN QUANTITATIVE: CPT | Performed by: FAMILY MEDICINE

## 2023-04-24 PROCEDURE — 86481 TB AG RESPONSE T-CELL SUSP: CPT | Performed by: FAMILY MEDICINE

## 2023-04-24 PROCEDURE — 80061 LIPID PANEL: CPT | Performed by: FAMILY MEDICINE

## 2023-04-24 PROCEDURE — 99214 OFFICE O/P EST MOD 30 MIN: CPT | Performed by: FAMILY MEDICINE

## 2023-04-24 PROCEDURE — 82570 ASSAY OF URINE CREATININE: CPT | Performed by: FAMILY MEDICINE

## 2023-04-24 PROCEDURE — 36415 COLL VENOUS BLD VENIPUNCTURE: CPT | Performed by: FAMILY MEDICINE

## 2023-04-24 PROCEDURE — 83036 HEMOGLOBIN GLYCOSYLATED A1C: CPT | Performed by: FAMILY MEDICINE

## 2023-04-24 PROCEDURE — 80053 COMPREHEN METABOLIC PANEL: CPT | Performed by: FAMILY MEDICINE

## 2023-04-24 RX ORDER — LISINOPRIL 10 MG/1
10 TABLET ORAL DAILY
Qty: 90 TABLET | Refills: 1 | Status: SHIPPED | OUTPATIENT
Start: 2023-04-24 | End: 2023-06-13

## 2023-04-24 RX ORDER — TAMSULOSIN HYDROCHLORIDE 0.4 MG/1
CAPSULE ORAL
Qty: 90 CAPSULE | Refills: 2 | Status: SHIPPED | OUTPATIENT
Start: 2023-06-01 | End: 2023-12-04

## 2023-04-24 RX ORDER — ACETAMINOPHEN 500 MG
TABLET ORAL
Qty: 100 TABLET | Refills: 5 | Status: SHIPPED | OUTPATIENT
Start: 2023-04-24 | End: 2024-02-13

## 2023-04-24 RX ORDER — METOPROLOL SUCCINATE 50 MG/1
50 TABLET, EXTENDED RELEASE ORAL DAILY
Qty: 90 TABLET | Refills: 1 | Status: SHIPPED | OUTPATIENT
Start: 2023-06-01 | End: 2023-05-11

## 2023-04-24 RX ORDER — ATORVASTATIN CALCIUM 20 MG/1
20 TABLET, FILM COATED ORAL DAILY
Qty: 90 TABLET | Refills: 1 | Status: SHIPPED | OUTPATIENT
Start: 2023-06-01 | End: 2023-11-01

## 2023-04-24 RX ORDER — FINASTERIDE 5 MG/1
5 TABLET, FILM COATED ORAL DAILY
Qty: 90 TABLET | Refills: 1 | Status: SHIPPED | OUTPATIENT
Start: 2023-06-01 | End: 2023-12-04

## 2023-04-24 ASSESSMENT — PAIN SCALES - GENERAL: PAINLEVEL: NO PAIN (0)

## 2023-04-24 NOTE — PROGRESS NOTES
Assessment & Plan     Type 2 diabetes mellitus without complication, without long-term current use of insulin (H)  Diet controlled. Doing well. Strict control of diet is not necessary for his age specially at  with his current a1c. Regular diet should be fine.  - Hemoglobin A1c; Future  - Lipid panel reflex to direct LDL Fasting; Future  - Albumin Random Urine Quantitative with Creat Ratio; Future  - Comprehensive metabolic panel (BMP + Alb, Alk Phos, ALT, AST, Total. Bili, TP); Future  - Hemoglobin A1c  - Lipid panel reflex to direct LDL Fasting  - Albumin Random Urine Quantitative with Creat Ratio  - Comprehensive metabolic panel (BMP + Alb, Alk Phos, ALT, AST, Total. Bili, TP)    Hyperlipidemia LDL goal <100  Patient is tolerating current medication without any major side effects of concerns and current dose seems reasonable too.  Current medication regime is effective. Continue current treatment without any changes.   - atorvastatin (LIPITOR) 20 MG tablet; Take 1 tablet (20 mg) by mouth daily    Hesitancy of micturition  Patient is tolerating current medication without any major side effects of concerns and current dose seems reasonable too.  Current medication regime is effective. Continue current treatment without any changes.   - finasteride (PROSCAR) 5 MG tablet; Take 1 tablet (5 mg) by mouth daily  - tamsulosin (FLOMAX) 0.4 MG capsule; TAKE 1 CAPSULE BY MOUTH EVERY DAY    Hypertension goal BP (blood pressure) < 140/80  bp above goal. Will add lisinpril.   - metoprolol succinate ER (TOPROL XL) 50 MG 24 hr tablet; Take 1 tablet (50 mg) by mouth daily  - lisinopril (ZESTRIL) 10 MG tablet; Take 1 tablet (10 mg) by mouth daily    Chronic low back pain with sciatica, sciatica laterality unspecified, unspecified back pain laterality  Tylenol prn.  - acetaminophen (MAPAP) 500 MG tablet; TAKE 1 TABLET BY MOUTH EVERY 6 HOURS AS NEEDED    Screening examination for pulmonary tuberculosis  History of lantent TB  "rx. Got INH for 9 months. quantiferon may remain positive after treatment. Needs it for his . Will recheck quantiferon but would recommend against checking it again in future if it remains positive.   - Quantiferon TB Gold Plus; Future  - Quantiferon TB Gold Plus    Filled out fax. Put in MA box pod 2 to be faxed.      BMI:   Estimated body mass index is 25.74 kg/m  as calculated from the following:    Height as of this encounter: 1.621 m (5' 3.8\").    Weight as of this encounter: 67.6 kg (149 lb).     Samuel Lowe MD, MD  [facility]    Terri Barry is a 88 year old, presenting for the following health issues:  paperwork        4/24/2023     3:15 PM   Additional Questions   Roomed by Vanessa BATEMAN   Accompanied by Daughter, Sunshine Torres 4/24/2023   Any forms needing to be completed Yes     History of Present Illness       Reason for visit:  [employer] work    He eats 4 or more servings of fruits and vegetables daily.He consumes 0 sweetened beverage(s) daily.He exercises with enough effort to increase his heart rate 30 to 60 minutes per day.  He exercises with enough effort to increase his heart rate 4 days per week.   He is taking medications regularly.     Phone  used.     Has a  form that needs to be filled out.     Per patient - he needs medication refilled.     Fasting due to Ramadan.     Daughter helps.     Lives alone.     Review of Systems         Objective    BP (!) 164/72 (BP Location: Right arm, Patient Position: Sitting, Cuff Size: Adult Regular)   Pulse 55   Temp 97.8  F (36.6  C) (Oral)   Resp 16   Ht 1.621 m (5' 3.8\")   Wt 67.6 kg (149 lb)   SpO2 99%   BMI 25.74 kg/m    Body mass index is 25.74 kg/m .  Physical Exam   GENERAL: healthy, alert and no distress  EYES: Eyes grossly normal to inspection, PERRL and conjunctivae and sclerae normal  HENT: ear canals and TM's normal, nose and mouth without ulcers or lesions  NECK: no " adenopathy, no asymmetry, masses, or scars and thyroid normal to palpation  RESP: lungs clear to auscultation - no rales, rhonchi or wheezes  CV: regular rate and rhythm,   MS: no gross musculoskeletal defects noted, no edema, gait is slightly unsteady  SKIN: no suspicious lesions or rashes  NEURO: Normal strength and tone, mentation intact and speech normal  PSYCH: mentation appears normal, affect normal/bright

## 2023-04-24 NOTE — LETTER
April 28, 2023      Asha Sanford  [address]  [facility] [id number]        Dear ,    We are writing to inform you of your test results.    TB screening test is positive but it may remain positive in some people and it is not concerning.  Cholesterol is stable.  Kidney, liver function test are fine.  Diabetes test is within acceptable range.     Resulted Orders   Hemoglobin A1c   Result Value Ref Range    Hemoglobin A1C 5.5 0.0 - 5.6 %      Comment:      Normal <5.7%   Prediabetes 5.7-6.4%    Diabetes 6.5% or higher     Note: Adopted from ADA consensus guidelines.   Lipid panel reflex to direct LDL Fasting   Result Value Ref Range    Cholesterol 142 <200 mg/dL    Triglycerides 79 <150 mg/dL    Direct Measure HDL 53 >=40 mg/dL    LDL Cholesterol Calculated 73 <=100 mg/dL    Non HDL Cholesterol 89 <130 mg/dL    Narrative    Cholesterol  Desirable:  <200 mg/dL    Triglycerides  Normal:  Less than 150 mg/dL  Borderline High:  150-199 mg/dL  High:  200-499 mg/dL  Very High:  Greater than or equal to 500 mg/dL    Direct Measure HDL  Female:  Greater than or equal to 50 mg/dL   Male:  Greater than or equal to 40 mg/dL    LDL Cholesterol  Desirable:  <100mg/dL  Above Desirable:  100-129 mg/dL   Borderline High:  130-159 mg/dL   High:  160-189 mg/dL   Very High:  >= 190 mg/dL    Non HDL Cholesterol  Desirable:  130 mg/dL  Above Desirable:  130-159 mg/dL  Borderline High:  160-189 mg/dL  High:  190-219 mg/dL  Very High:  Greater than or equal to 220 mg/dL   Albumin Random Urine Quantitative with Creat Ratio   Result Value Ref Range    Creatinine Urine mg/dL 110.0 mg/dL      Comment:      The reference ranges have not been established in urine creatinine. The results should be integrated into the clinical context for interpretation.    Albumin Urine mg/L 31.5 mg/L      Comment:      The reference ranges have not been established in urine albumin. The results should be integrated into the  clinical context for interpretation.    Albumin Urine mg/g Cr 28.64 (H) 0.00 - 17.00 mg/g Cr      Comment:      Microalbuminuria is defined as an albumin:creatinine ratio of 17 to 299 for males and 25 to 299 for females. A ratio of albumin:creatinine of 300 or higher is indicative of overt proteinuria.  Due to biologic variability, positive results should be confirmed by a second, first-morning random or 24-hour timed urine specimen. If there is discrepancy, a third specimen is recommended. When 2 out of 3 results are in the microalbuminuria range, this is evidence for incipient nephropathy and warrants increased efforts at glucose control, blood pressure control, and institution of therapy with an angiotensin-converting-enzyme (ACE) inhibitor (if the patient can tolerate it).     Comprehensive metabolic panel (BMP + Alb, Alk Phos, ALT, AST, Total. Bili, TP)   Result Value Ref Range    Sodium 140 136 - 145 mmol/L    Potassium 4.4 3.4 - 5.3 mmol/L    Chloride 103 98 - 107 mmol/L    Carbon Dioxide (CO2) 27 22 - 29 mmol/L    Anion Gap 10 7 - 15 mmol/L    Urea Nitrogen 12.0 8.0 - 23.0 mg/dL    Creatinine 1.12 0.67 - 1.17 mg/dL    Calcium 9.7 8.8 - 10.2 mg/dL    Glucose 87 70 - 99 mg/dL    Alkaline Phosphatase 53 40 - 129 U/L    AST 19 10 - 50 U/L    ALT 8 (L) 10 - 50 U/L    Protein Total 6.7 6.4 - 8.3 g/dL    Albumin 4.1 3.5 - 5.2 g/dL    Bilirubin Total 0.5 <=1.2 mg/dL    GFR Estimate 64 >60 mL/min/1.73m2      Comment:      eGFR calculated using 2021 CKD-EPI equation.   Quantiferon TB Gold Plus Grey Tube   Result Value Ref Range    Quantiferon Nil Tube 0.06 IU/mL   Quantiferon TB Gold Plus Green Tube   Result Value Ref Range    Quantiferon TB1 Tube 0.66 IU/mL   Quantiferon TB Gold Plus Yellow Tube   Result Value Ref Range    Quantiferon TB2 Tube 0.60    Quantiferon TB Gold Plus Purple Tube   Result Value Ref Range    Quantiferon Mitogen 10.00 IU/mL   Quantiferon TB Gold Plus   Result Value Ref Range    Quantiferon-TB  Gold Plus Positive (A) Negative      Comment:      Interferon gamma response to M.tuberculosis antigens was detected,suggesting infection with M.tuberculosis. Positive results in patients at low risk for infection should be interpreted with caution and repeat testing on a new sample should be considered as recommended by the 2017 ATS/IDSA/CDC Clinical Prac  priscilla Guidelines for Diagnosis of Tuberculosis in Adults and Children     TB1 Ag minus Nil Value 0.60 IU/mL    TB2 Ag minus Nil Value 0.54 IU/mL    Mitogen minus Nil Result 9.94 IU/mL    Nil Result 0.06 IU/mL       If you have any questions or concerns, please call the clinic at the number listed above.       Sincerely,      Samuel Lowe MD

## 2023-04-26 LAB
GAMMA INTERFERON BACKGROUND BLD IA-ACNC: 0.06 IU/ML
M TB IFN-G BLD-IMP: POSITIVE
M TB IFN-G CD4+ BCKGRND COR BLD-ACNC: 9.94 IU/ML
MITOGEN IGNF BCKGRD COR BLD-ACNC: 0.54 IU/ML
MITOGEN IGNF BCKGRD COR BLD-ACNC: 0.6 IU/ML
QUANTIFERON MITOGEN: 10 IU/ML
QUANTIFERON NIL TUBE: 0.06 IU/ML
QUANTIFERON TB1 TUBE: 0.66 IU/ML
QUANTIFERON TB2 TUBE: 0.6

## 2023-05-09 ENCOUNTER — TELEPHONE (OUTPATIENT)
Dept: FAMILY MEDICINE | Facility: CLINIC | Age: 88
End: 2023-05-09
Payer: COMMERCIAL

## 2023-05-09 ENCOUNTER — PATIENT OUTREACH (OUTPATIENT)
Dept: GERIATRIC MEDICINE | Facility: CLINIC | Age: 88
End: 2023-05-09

## 2023-05-09 DIAGNOSIS — I10 HYPERTENSION GOAL BP (BLOOD PRESSURE) < 140/80: ICD-10-CM

## 2023-05-09 NOTE — TELEPHONE ENCOUNTER
Continue old one (metoprolol) and ok to hold off on lisinopril.   Bring all the medications in bottles and schedule follow up with me. Ok to use same day hold. Next week would be most ideal.

## 2023-05-09 NOTE — TELEPHONE ENCOUNTER
"Dr. Lowe-Please review and advise.    \"[facility] Coordination Contact     Member called and left voice mail(VM) today 05/09/2023.  Member states there were changes to his blood pressure medication and was started on new BP medication.  Name of medication not left in VM.  Member wants to go back to old the BP medication due to side effects with new medication(also side effects not mentioned in VM).  Routing message to clinic.  Please call member back at [phone].  Also, member's daughter can be reached at [phone].  Thank you,  Daphne Ochoa RN BSN N      [facility] Coordinator  Phone:   [phone]  Fax:       [phone]\"        Per 4/24/23 office visit note:    \"Hypertension goal BP (blood pressure) < 140/80  bp above goal. Will add lisinpril.   - metoprolol succinate ER (TOPROL XL) 50 MG 24 hr tablet; Take 1 tablet (50 mg) by mouth daily  - lisinopril (ZESTRIL) 10 MG tablet; Take 1 tablet (10 mg) by mouth daily\"      Writer called patient with [employer] , #[id number], and spoke with patient.    Per patient:  1. Stopped Lisinopril due to shaking  2. Unable to tell writer which blood pressure medication he is currently taking and that Dr. Lowe will know to which medication patient refers; \"I am taking the old one\" (Metoprolol succinate ER 50 mg)?    Informed patient message will be sent to Dr. Lowe.    Patient verbalized understanding and in agreement with plan.    Thank you!  CRISTY Unger, RN-[facility]                      "

## 2023-05-10 NOTE — TELEPHONE ENCOUNTER
Writer called patient using [employer]  (ID# [id number]).     Writer called and left message on patient's voicemail to call back and speak with a triage nurse.    MALIK BacaN LINN  [facility]

## 2023-05-11 RX ORDER — METOPROLOL SUCCINATE 50 MG/1
50 TABLET, EXTENDED RELEASE ORAL DAILY
Qty: 30 TABLET | Refills: 0 | Status: SHIPPED | OUTPATIENT
Start: 2023-06-01 | End: 2023-06-13

## 2023-05-11 NOTE — TELEPHONE ENCOUNTER
Member is calling CC back today and reports he has four tablets of old medication left and is requesting refill.  Also, wanted same day appointment after 0330 pm during weekday so daughter can bring him. Are you able to schedule  same day appointment?  I can call member or member's daughter and let them know on time and date.  Thank you,  Daphne Ochoa, RN BSN N      [facility] Coordinator  Phone:   [phone]  Fax:       [phone]

## 2023-05-11 NOTE — TELEPHONE ENCOUNTER
Dr. Lowe out until 5/15.   I suspect he wants an in person visit. I can refill metoprolol for 1 month to bridge until appt with PCP.      Dr. Negro

## 2023-05-12 NOTE — TELEPHONE ENCOUNTER
Called pt with [facility]  # [phone].    Told pt about the available metoprolol rx.  Pt hung up while I was telling him that we were still working on the appt with PCP after 330 pm. Dr. Lowe is back in clinic on Monday 5/15/23.  LINN Sanchez

## 2023-05-16 ENCOUNTER — PATIENT OUTREACH (OUTPATIENT)
Dept: GERIATRIC MEDICINE | Facility: CLINIC | Age: 88
End: 2023-05-16
Payer: COMMERCIAL

## 2023-05-16 ENCOUNTER — TELEPHONE (OUTPATIENT)
Dept: FAMILY MEDICINE | Facility: CLINIC | Age: 88
End: 2023-05-16
Payer: COMMERCIAL

## 2023-05-16 NOTE — PROGRESS NOTES
[facility] Care Coordination Contact    Per CC, member needs an appointment with PCP for a BP & medication follow-up. Prefers after 3:30 PM     Tuesday June 13, 2023 @ 3:40 pm    They are also sending a message to the provider to see if there is an option for a sooner appointment. Clinic will follow up with me if that is an option.     Brook Henderson  Care Management Specialist  [facility]  [phone]

## 2023-05-16 NOTE — TELEPHONE ENCOUNTER
Reason for Call:  Appointment Request    Patient requesting this type of appt: Chronic Diease Management/Medication/Follow-Up    Requested provider: Samuel Lowe    Reason patient unable to be scheduled: Not within requested timeframe    When does patient want to be seen/preferred time: 3-7 days    Comments: Care coordinator requesting to schedule appointment for patient with primary care provider. Unable to find available appointment before 3/13/2023. They would like to add patient to wait list for any upcoming cancellations if possible.     Okay to leave a detailed message?: Yes at Other phone number:  [phone]    Call taken on 5/16/2023 at 3:30 PM by Ashlie Ocampo

## 2023-05-18 ENCOUNTER — OFFICE VISIT (OUTPATIENT)
Dept: URGENT CARE | Facility: URGENT CARE | Age: 88
End: 2023-05-18
Payer: COMMERCIAL

## 2023-05-18 VITALS
RESPIRATION RATE: 18 BRPM | SYSTOLIC BLOOD PRESSURE: 144 MMHG | TEMPERATURE: 97.7 F | HEART RATE: 85 BPM | BODY MASS INDEX: 26.08 KG/M2 | DIASTOLIC BLOOD PRESSURE: 86 MMHG | OXYGEN SATURATION: 100 % | WEIGHT: 151 LBS

## 2023-05-18 DIAGNOSIS — J06.9 VIRAL URI WITH COUGH: Primary | ICD-10-CM

## 2023-05-18 DIAGNOSIS — I10 HYPERTENSION GOAL BP (BLOOD PRESSURE) < 140/80: Chronic | ICD-10-CM

## 2023-05-18 PROCEDURE — 99213 OFFICE O/P EST LOW 20 MIN: CPT | Performed by: NURSE PRACTITIONER

## 2023-05-18 PROCEDURE — 87635 SARS-COV-2 COVID-19 AMP PRB: CPT | Performed by: NURSE PRACTITIONER

## 2023-05-18 RX ORDER — BENZONATATE 100 MG/1
100 CAPSULE ORAL 3 TIMES DAILY PRN
Qty: 30 CAPSULE | Refills: 0 | Status: SHIPPED | OUTPATIENT
Start: 2023-05-18 | End: 2023-05-28

## 2023-05-18 NOTE — PROGRESS NOTES
Chief Complaint   Patient presents with     Cough     Cough and fever x 2 days          ICD-10-CM    1. Viral URI with cough  J06.9 benzonatate (TESSALON) 100 MG capsule     Symptomatic COVID-19 Virus (Coronavirus) by PCR Nose      2. Hypertension goal BP (blood pressure) < 140/80  I10       Medications as prescribed.  Increase fluid intake and rest.  Isolate at home until results of COVID-19 test have returned.  If you develop severe shortness of breath family understands to take him to the emergency room.    Patient's blood pressure is high today.  He reports he usually takes his medications in the evening but has been taking them as prescribed.  Encouraged him to monitor blood pressure at home and keep a log to discuss with primary care provider at next visit.  Limit salt intake.      Subjective     Asha Sanford is an 88 year old male who presents to clinic today for cough, headache, runny nose for 2 days.  Granddaughter is present with patient, helping to provide history and interpretation.      ROS: 10 point ROS neg other than the symptoms noted above in the HPI.       Objective    BP (!) 144/86   Pulse 85   Temp 97.7  F (36.5  C) (Tympanic)   Resp 18   Wt 68.5 kg (151 lb)   SpO2 100%   BMI 26.08 kg/m    Nurses notes and VS have been reviewed.    Physical Exam       GENERAL APPEARANCE: healthy appearing, alert     EYES: PERRL, EOMI, sclera non-icteric     HENT: oral exam benign, mucus membranes intact, without ulcers or lesions     NECK: no adenopathy or asymmetry, thyroid normal to palpation     RESP: lungs clear to auscultation - no rales, rhonchi or wheezes     CV: regular rates and rhythm, no murmurs, rubs, or gallop     ABDOMEN:  soft, nontender, no HSM or masses and bowel sounds normal     MS: extremities normal- no gross deformities noted; normal muscle tone.     SKIN: no suspicious lesions or rashes     NEURO: Normal strength and tone, mentation intact and speech normal     PSYCH: normal  thought process; no significant mood disturbance      TATUM Lee, CNP  [facility] Provider    The use of Dragon/Massdrop dictation services may have been used to construct the content in this note; any grammatical or spelling errors are non-intentional. Please contact the author of this note directly if you are in need of any clarification.

## 2023-05-18 NOTE — PATIENT INSTRUCTIONS
Discharge Instructions  Upper Respiratory Infection    The upper respiratory tract includes the sinuses, nasal passages, pharynx, and larynx. A URI, or upper respiratory infection, is an infection of any of the parts of the upper airway. Symptoms include runny nose, congestion, sore throat, cough, and fever. URIs are almost always caused by a virus. Antibiotics do not help with virus infections, so are not used for an ordinary URI. A URI is very contagious through coughing and nasal secretions; make sure you wash your hands often and clean surfaces after sneezing, coughing or touching them.  Viruses can live on surfaces for up to 3 days.      Return to the Emergency Department if:  Any of the symptoms you have get much worse.  You seem very sick, like being too weak to get up.  You have any new symptoms, especially serious things like chest pain.   You are short of breath.   You have a severe headache.  You are vomiting so much you can t keep fluids or medicines down.  You have confusion or seem unusually drowsy.  You have a seizure or convulsion.    Follow-up:    You should start to improve in 3 - 5 days.  A cough can linger for up to six weeks, but overall you should be feeling much better.  See your doctor if you have a fever for more than 3 days, or if you are not feeling better within 5 days.      What can I do to help myself?  Fill any prescriptions the doctor gave you and take them right away  If you have a fever, get plenty of rest and drink lots of fluids, especially water. Using a humidifier or saline nose spray will also help loosen secretions.   What clothes or blankets you have on won t change your fever. Do what is comfortable for you.  Bathing or sponging in lukewarm water may help you feel better.  Tylenol  (acetaminophen), Motrin  (ibuprofen), or Advil  (ibuprofen) help bring fever down and may help you feel more comfortable. Be sure to read and follow the package directions, and ask your doctor if  you have questions.  Do not drink alcohol.  Decongestants may help you feel better. You may use decongestant nose sprays Afrin  (oxymetazoline) or Josafat-Synephrine  (phenylephrine hydrochloride) for up to 3 days, or may use a decongestant tablet like Sudafed  (pseudoephedrine).  If you were given a prescription for medicine here today, be sure to read all of the information (including the package insert) that comes with your prescription.  This will include important information about the medicine, its side effects, and any warnings that you need to know about.  The pharmacist who fills the prescription can provide more information and answer questions you may have about the medicine.  If you have questions or concerns that the pharmacist cannot address, please call or return to the Emergency Department.   Opioid Medication Information    Pain medications are among the most commonly prescribed medicines, so we are including this information for all our patients. If you did not receive pain medication or get a prescription for pain medicine, you can ignore it.     You may have been given a prescription for an opioid (narcotic) pain medicine and/or have received a pain medicine while here in the Emergency Department. These medicines can make you drowsy or impaired. You must not drive, operate dangerous equipment, or engage in any other dangerous activities while taking these medications. If you drive while taking these medications, you could be arrested for DUI, or driving under the influence. Do not drink any alcohol while you are taking these medications.     Opioid pain medications can cause addiction. If you have a history of chemical dependency of any type, you are at a higher risk of becoming addicted to pain medications.  Only take these prescribed medications to treat your pain when all other options have been tried. Take it for as short a time and as few doses as possible. Store your pain pills in a secure  place, as they are frequently stolen and provide a dangerous opportunity for children or visitors in your house to start abusing these powerful medications. We will not replace any lost or stolen medicine.  As soon as your pain is better, you should flush all your remaining medication.     Many prescription pain medications contain Tylenol  (acetaminophen), including Vicodin , Tylenol #3 , Norco , Lortab , and Percocet .  You should not take any extra pills of Tylenol  if you are using these prescription medications or you can get very sick.  Do not ever take more than 3000 mg of acetaminophen in any 24 hour period.    All opioids tend to cause constipation. Drink plenty of water and eat foods that have a lot of fiber, such as fruits, vegetables, prune juice, apple juice and high fiber cereal.  Take a laxative if you don t move your bowels at least every other day. Miralax , Milk of Magnesia, Colace , or Senna  can be used to keep you regular.      Remember that you can always come back to the Emergency Department if you are not able to see your regular doctor in the amount of time listed above, if you get any new symptoms, or if there is anything that worries you.

## 2023-05-19 LAB — SARS-COV-2 RNA RESP QL NAA+PROBE: NEGATIVE

## 2023-06-13 ENCOUNTER — OFFICE VISIT (OUTPATIENT)
Dept: FAMILY MEDICINE | Facility: CLINIC | Age: 88
End: 2023-06-13
Payer: COMMERCIAL

## 2023-06-13 VITALS
HEART RATE: 60 BPM | OXYGEN SATURATION: 97 % | DIASTOLIC BLOOD PRESSURE: 62 MMHG | BODY MASS INDEX: 26.12 KG/M2 | HEIGHT: 64 IN | TEMPERATURE: 98.5 F | WEIGHT: 153 LBS | SYSTOLIC BLOOD PRESSURE: 140 MMHG | RESPIRATION RATE: 16 BRPM

## 2023-06-13 DIAGNOSIS — I10 HYPERTENSION GOAL BP (BLOOD PRESSURE) < 140/80: ICD-10-CM

## 2023-06-13 DIAGNOSIS — E11.9 TYPE 2 DIABETES MELLITUS WITHOUT COMPLICATION, WITHOUT LONG-TERM CURRENT USE OF INSULIN (H): Primary | Chronic | ICD-10-CM

## 2023-06-13 PROCEDURE — 99214 OFFICE O/P EST MOD 30 MIN: CPT | Performed by: FAMILY MEDICINE

## 2023-06-13 RX ORDER — METOPROLOL SUCCINATE 50 MG/1
50 TABLET, EXTENDED RELEASE ORAL DAILY
Qty: 30 TABLET | Refills: 5 | Status: SHIPPED | OUTPATIENT
Start: 2023-06-13 | End: 2023-06-13

## 2023-06-13 RX ORDER — METOPROLOL SUCCINATE 50 MG/1
50 TABLET, EXTENDED RELEASE ORAL DAILY
Qty: 90 TABLET | Refills: 1 | Status: SHIPPED | OUTPATIENT
Start: 2023-06-13 | End: 2023-12-11

## 2023-06-13 ASSESSMENT — PAIN SCALES - GENERAL: PAINLEVEL: NO PAIN (0)

## 2023-06-13 NOTE — PROGRESS NOTES
Assessment & Plan     Hypertension goal BP (blood pressure) < 140/80  He did not bring his medications and he is unclear about his medications but he feels a new medication (lisinopril) is making him shaky and we agreed to stop that.  At this point despite having slightly higher blood pressure I am not going to adjust any further medications due to his age and I again recommended them to bring their medications next time for clarification.  Both patient and daughter agreed.  They have been notified previously over phone to bring the medications to but patient admits she forgot to bring it.  - metoprolol succinate ER (TOPROL XL) 50 MG 24 hr tablet; Take 1 tablet (50 mg) by mouth daily    Type 2 diabetes mellitus without complication, without long-term current use of insulin (H)  Doing well.  Diet controlled.  No strict control is necessary for his age.                   Samuel Lowe MD, MD  [facility]    Terri Barry is a 88 year old, presenting for the following health issues:  Hypertension ( present over the phone with today visit. Patient here for blood pressure medications recheck.  )        6/13/2023     4:11 PM   Additional Questions   Roomed by cheikh moise   Accompanied by daughter         6/13/2023     4:11 PM   Patient Reported Additional Medications   Patient reports taking the following new medications none     History of Present Illness       Reason for visit:  High bald    He eats 4 or more servings of fruits and vegetables daily.He consumes 1 sweetened beverage(s) daily.He exercises with enough effort to increase his heart rate 30 to 60 minutes per day.    He is taking medications regularly.     Here with family member.  Phone  used.     Per patient - new bp medication (lisinpril).   Called clinic - shakiness.   Now feeling better after changing medication.   Was advised to bring all medications but forget to bring it.   Per patient -  "taking old medication. (metoprolol) and not taking new one (lisinpril).   Takes total of 6 pills per day.     Review of Systems         Objective    BP (!) 140/62 (BP Location: Right arm, Patient Position: Sitting, Cuff Size: Adult Regular)   Pulse 60   Temp 98.5  F (36.9  C) (Temporal)   Resp 16   Ht 1.626 m (5' 4\")   Wt 69.4 kg (153 lb)   SpO2 97%   BMI 26.26 kg/m    Body mass index is 26.26 kg/m .  Physical Exam                       "

## 2023-10-09 ENCOUNTER — TELEPHONE (OUTPATIENT)
Dept: FAMILY MEDICINE | Facility: CLINIC | Age: 88
End: 2023-10-09
Payer: COMMERCIAL

## 2023-10-09 NOTE — TELEPHONE ENCOUNTER
Reason for Call:  Appointment Request    Patient requesting this type of appt:  Injury     Requested provider: Samuel Lowe    Reason patient unable to be scheduled: Not within requested timeframe    When does patient want to be seen/preferred time: 3-7 days    Comments: Patient would like to see his provider for a fall and body pain. Has a appt on 11/1/23 but wants to be seen sooner.    Okay to leave a detailed message?: Yes at Cell number on file:    Telephone Information:   Mobile [phone]       Call taken on 10/9/2023 at 8:24 AM by Marquise Garrido

## 2023-10-11 ENCOUNTER — NURSE TRIAGE (OUTPATIENT)
Dept: NURSING | Facility: CLINIC | Age: 88
End: 2023-10-11
Payer: COMMERCIAL

## 2023-10-11 NOTE — TELEPHONE ENCOUNTER
services with ID # [id number].    Writer called and left message on patient's voicemail to call back and speak with a triage nurse.    MALIK BacaN RN  [facility]

## 2023-10-11 NOTE — TELEPHONE ENCOUNTER
"Daughter Sunshine requested triage nurse to call william Holloway @ [phone]. (Daughter does not have consent to communicate in patient's record. Granddaughter does.)  5:24pm Attempted to contact granddaughter: no answer. Unidentified outgoing voice message--no message left.   5:35pm: william is not with her grandfather. She will find out why her mother called. Requests to be called back in 5 minutes.   5:42pm  william Holloway conferenced grandfather Asha on the phone with triage nurse. She interpreted for grandfather. \"My right hip hurts. I fell down a couple of times and now I have pain in my hip. I want to see a Dr. I fell 4 days ago. I cannot get up from bed, cannot walk. Pain currently=\"8-9\". I can't put  my foot on the floor. I think my hip might be broken.\"  Triaged to a disposition of call 911 now , which granddaughter and patient agree to do.     Sydnee Jiménez RN Triage Nurse Advisor 5:55 PM 10/11/2023  Reason for Disposition   Can't stand (bear weight) or walk    Additional Information   Negative: Serious injury with multiple fractures (broken bones)   Negative: [1] Major bleeding (e.g., actively dripping or spurting) AND [2] can't be stopped   Negative: Bullet wound, stabbed by knife, or other serious penetrating wound   Negative: Looks like a dislocated joint (crooked or deformed)    Protocols used: Hip Injury-A-AH    "

## 2023-10-12 ENCOUNTER — TELEPHONE (OUTPATIENT)
Dept: FAMILY MEDICINE | Facility: CLINIC | Age: 88
End: 2023-10-12
Payer: COMMERCIAL

## 2023-10-12 NOTE — TELEPHONE ENCOUNTER
Reason for Call:  Appointment Request    Patient requesting this type of appt:  office visit    Requested provider: Samuel Lowe    Reason patient unable to be scheduled: Not within requested timeframe    When does patient want to be seen/preferred time:  an ything before November 1st`    Comments: Patient is having hip pain and would like to be seen before his scheduled appt on 11/01/2023    Okay to leave a detailed message?: Yes at Cell number on file:  call daughter   Telephone Information:   Mobile [phone]       Call taken on 10/12/2023 at 8:45 AM by Yvrose Cordova

## 2023-10-12 NOTE — TELEPHONE ENCOUNTER
Called and spoke with patient's daughter. Offered to triage  patient with  and daughter declines. Wants to have him seen today. Recommended they go to [place] urgent care.  She will take him now.    Holly Vicente RN, BSN, PHN  [facility]  [phone]

## 2023-10-24 ENCOUNTER — PATIENT OUTREACH (OUTPATIENT)
Dept: GERIATRIC MEDICINE | Facility: CLINIC | Age: 88
End: 2023-10-24
Payer: COMMERCIAL

## 2023-10-24 NOTE — PROGRESS NOTES
"[facility] Care Coordination Contact  10/24/2023:  CC received notification of Emergency Room visit:  \"María Teresa Halimo S, RN  Asha Sanford had an ER visit on 10/12 to [facility] after a fall with low back/right-sided hip and leg pain..\"  CC contacted member and reviewed discharge summary.  Member has a follow-up appointment with PCP: Yes: scheduled on    Future Appointments   Date Time Provider Department Center   11/1/2023  4:00 PM Samuel Lowe MD [facility]   Member has had a change in condition: No  New referrals placed: No  Home Visit Needed: No  Care plan reviewed and updated.  PCP notified of ED visit via EMR.  Daphne Ochoa RN BSN PHN      [facility]  Care Coordinator  Phone:   [phone]  Fax:       [phone]        "

## 2023-10-31 ENCOUNTER — PATIENT OUTREACH (OUTPATIENT)
Dept: GERIATRIC MEDICINE | Facility: CLINIC | Age: 88
End: 2023-10-31
Payer: COMMERCIAL

## 2023-11-01 ENCOUNTER — OFFICE VISIT (OUTPATIENT)
Dept: FAMILY MEDICINE | Facility: CLINIC | Age: 88
End: 2023-11-01
Payer: COMMERCIAL

## 2023-11-01 VITALS
WEIGHT: 152 LBS | SYSTOLIC BLOOD PRESSURE: 163 MMHG | BODY MASS INDEX: 26.93 KG/M2 | RESPIRATION RATE: 17 BRPM | HEART RATE: 57 BPM | TEMPERATURE: 97.9 F | OXYGEN SATURATION: 96 % | HEIGHT: 63 IN | DIASTOLIC BLOOD PRESSURE: 74 MMHG

## 2023-11-01 DIAGNOSIS — G25.0 ESSENTIAL TREMOR: ICD-10-CM

## 2023-11-01 DIAGNOSIS — G89.29 CHRONIC LOW BACK PAIN WITH SCIATICA, SCIATICA LATERALITY UNSPECIFIED, UNSPECIFIED BACK PAIN LATERALITY: ICD-10-CM

## 2023-11-01 DIAGNOSIS — M54.40 CHRONIC LOW BACK PAIN WITH SCIATICA, SCIATICA LATERALITY UNSPECIFIED, UNSPECIFIED BACK PAIN LATERALITY: ICD-10-CM

## 2023-11-01 DIAGNOSIS — E78.5 HYPERLIPIDEMIA LDL GOAL <100: Chronic | ICD-10-CM

## 2023-11-01 DIAGNOSIS — K21.9 GASTROESOPHAGEAL REFLUX DISEASE WITHOUT ESOPHAGITIS: Chronic | ICD-10-CM

## 2023-11-01 DIAGNOSIS — E55.9 VITAMIN D DEFICIENCY: ICD-10-CM

## 2023-11-01 DIAGNOSIS — E11.9 TYPE 2 DIABETES MELLITUS WITHOUT COMPLICATION, WITHOUT LONG-TERM CURRENT USE OF INSULIN (H): Primary | ICD-10-CM

## 2023-11-01 LAB
HBA1C MFR BLD: 5.5 % (ref 0–5.6)
HOLD SPECIMEN: NORMAL

## 2023-11-01 PROCEDURE — 83036 HEMOGLOBIN GLYCOSYLATED A1C: CPT | Performed by: FAMILY MEDICINE

## 2023-11-01 PROCEDURE — 99214 OFFICE O/P EST MOD 30 MIN: CPT | Performed by: FAMILY MEDICINE

## 2023-11-01 PROCEDURE — 36415 COLL VENOUS BLD VENIPUNCTURE: CPT | Performed by: FAMILY MEDICINE

## 2023-11-01 RX ORDER — ATORVASTATIN CALCIUM 20 MG/1
20 TABLET, FILM COATED ORAL DAILY
Qty: 90 TABLET | Refills: 1 | Status: SHIPPED | OUTPATIENT
Start: 2023-11-01 | End: 2024-05-22

## 2023-11-01 RX ORDER — RESPIRATORY SYNCYTIAL VIRUS VACCINE 120MCG/0.5
0.5 KIT INTRAMUSCULAR ONCE
Qty: 1 EACH | Refills: 0 | Status: CANCELLED | OUTPATIENT
Start: 2023-11-01 | End: 2023-11-01

## 2023-11-01 RX ORDER — VITAMIN B COMPLEX
50 TABLET ORAL DAILY
Qty: 180 TABLET | Refills: 3 | Status: SHIPPED | OUTPATIENT
Start: 2023-11-01

## 2023-11-01 RX ORDER — ERGOCALCIFEROL 1.25 MG/1
CAPSULE, LIQUID FILLED ORAL
Qty: 12 CAPSULE | Refills: 11 | Status: CANCELLED | OUTPATIENT
Start: 2023-11-01

## 2023-11-01 RX ORDER — GABAPENTIN 100 MG/1
100 CAPSULE ORAL 3 TIMES DAILY PRN
Qty: 30 CAPSULE | Refills: 1 | Status: SHIPPED | OUTPATIENT
Start: 2023-11-01

## 2023-11-01 ASSESSMENT — PAIN SCALES - GENERAL: PAINLEVEL: NO PAIN (0)

## 2023-11-01 NOTE — PROGRESS NOTES
"  {PROVIDER CHARTING PREFERENCE:396771}    Subjective   Asha is a 88 year old, presenting for the following health issues:  Fall      11/1/2023     3:37 PM   Additional Questions   Roomed by Vanessa BATEMAN   Accompanied by Niece       History of Present Illness       Hypertension: He presents for follow up of hypertension.  He does check blood pressure  regularly outside of the clinic. Outpatient blood pressures have not been over 140/90. He follows a low salt diet.     He eats 2-3 servings of fruits and vegetables daily.He consumes 1 sweetened beverage(s) daily.He exercises with enough effort to increase his heart rate 10 to 19 minutes per day.  He exercises with enough effort to increase his heart rate 3 or less days per week.   He is taking medications regularly.       {MA/LPN/RN Pre-Provider Visit Orders- hCG/UA/Strep (Optional):549210}  {SUPERLIST (Optional):671056}  {additonal problems for provider to add (Optional):600787}      Review of Systems   {ROS COMP (Optional):661541}      Objective    BP (!) 155/62 (BP Location: Right arm, Patient Position: Sitting, Cuff Size: Adult Regular)   Pulse 57   Temp 97.9  F (36.6  C) (Temporal)   Resp 17   Ht 1.594 m (5' 2.75\")   Wt 68.9 kg (152 lb)   SpO2 96%   BMI 27.14 kg/m    Body mass index is 27.14 kg/m .  Physical Exam   {Exam List (Optional):552980}    {Diagnostic Test Results (Optional):106405}    {AMBULATORY ATTESTATION (Optional):282718}              "

## 2023-11-01 NOTE — COMMUNITY RESOURCES LIST (PATIENT PREFERRED LANGUAGE)
11/01/2023   [facility]  N/A  Kristian wilson gerard sarkar , apriljorge yo suero ama ruben fisher.  Phone: [phone]   Email: N/A   Address: [address] [place] [address]   Hours: N/A        Jose Esquivel - Dhibaatada guriyeynta  1  [facility] Fogaanta: 1.09 miles      Telefoon/Virtual   [address] [place] [address]  Luuqad: Laureano Heredia: Isniinta - Axad Furan 24 [facility]   Phone: [phone] Email: info@[facility] Website: http://www.[facility]     2  Gatodina Noelle - Jose currie  - Dhibaatada guriyaynta Fogaanta: 1.32 miles      Telefoon/Virtual   [address] [place] [address]  Luuqad: Laureano Mariedaha: Isniinta - Axad Furan 24 [facility]   Phone: [phone] Email: communications@[facility]-mn.org Website: https://[facility]-mn.org/oursaviourshousing/          Artieridonald reyna [facility] Hospitalu-xian  3  Adeismaelga Bulliu Caodaism [facility]) - Adeegyada Guriyeynta Fogaanta: 1.12 miles      [facility]   [address] [place] [address]  Luuqad: Laureano Heredia: Christianne Ramirez 9:00 AM - 5:00 PM  Ellie: Rylee   Phone: [phone] Email: housing@[facility].org Website: http://www.[facility].org/housing     4  Carol yancey [place] - Xidhiidhka Yogesh Posada Fogaanta: 2.01 miles      [facility]   [address] [place] [address]  Luuqad: Laureano Heredia: Christianne Oakes 10:00 PM - 5:00 PM  Ellie: Rylee   Phone: [phone] Email: info@saintolaf.org Website: http://www.saintolaf.org/     Sherman tellez  5  Trishaha Marco Antonio Erickson Fogaanta: 1.41 miles      [facility]   [address] [place] [address]  Luuqad: Laureano Heredia: Christianne Ramirez 12:00 PM - 3:00 PM  Ellie:  Rylee   Phone: [phone] Email: "[employer]@5app.SoupQubes Website: http://"[employer].org/     6  Roberto Montgomery [facility] - Xarunta Fursadaha Fogaanta: 1.65 miles      Qof ahaan   [address] [place] [address]  Luuqad: Ingiriis, Isbaanish, Soomaali  Saacadaha: Isniinta - Sabti 7:00 AM - 3:00 PM  Kharashyada: Hernanaaharvey Is Leonilata   Phone: [phone] Email: info@Sarbari.[employer]ollen - [employer] Website: https://www.Sarbari.org/locations/opportunity-center/     Karolina prema javychloejos  7  Kevin Ceciliaumarinta Bulshada Ge De Los Santos ([facility]) Fogaanta: 1.75 miles      of ahaan   [address] [place] [address]  Luuqad: Ingiriis, Isbaanish, Soomaali  Saacadaha: Isniinta - Jimce 8:30 AM - 4:30 PM  Kharashyada: Bilaash   Phone: [phone] Email: gregory@[facility]-[place].org Website: http://www.[facility]-mn.org/     8  [facility] Guillaumenatalia Kaur Muñoz Fogaanta: 1.99 miles      Telefoon/Virtual   [address] [place] [address]  Luuqad: Amxaari, Carabi, Faransiis, Ingiriis, Isbaanish, Slovak, Luqadda Calaamadaha Maraykanka, Oromo, Ruush, Sawaaxili, Soomaali, Azeri, Stateless  Saacadaha: Isniinta - Talaado 9:00 AM - 4:00 PM , Arbacada 10:00 AM - 5:00 PM , Khamiista - Jimce 9:00 AM - 4:00 PM  Kharashyada: Bilaash   Phone: [phone] Email: oms@[place]. Website: http://www.[facility]/residents/human-services/multi-cultural-services     Yogesh rodriguez  9  Yogesh Carrillo [facility] Tee Santamaria Fogaanta: 20.25 miles      [facility]   [address] [place] [address]  Luuqad: Laureano Heredia: Christianne Ramirez 3:00 PM - 9:00 AM , Champ Gill 24 [facility]  Haylieda: Rylee   Phone: [phone] Ext.1 Website: https://www.saintandrews.org/2020/07/03/emergency-family-shelter/     Yogesh wang  10  Yogesh Drake Fogaanta: 1.32 miles      [facility]   [address] [place] [address]  Luuqad:  Laureano  Cynthiajeramie: Christianne - Mireya Furan 24 [facility]  Khlinnetteshyada: Bilaash   Phone: [phone] Email: communications@oscs-mn.org Website: https://oscs-mn.org/oursaviourshousing/     11  [facility] - [place]iaa Iftiinpedro Huffmanddreinier Fogaanta: 2.22 miles      Qof ahaan   [address] [place] [address]  Luuqad: Laureano Heredia: Chrisitanne - James 4:00 PM - 9:00 AM  Kharashyada: Bilaash   Phone: [phone] Email: kat@[facility].org Website: https://centralusa.Naval Hospitalationarmy.org/[facility] Centerer/          Mansi suero & Stephen Colonegzaira Alvarmadpedro   911  Adeegyada Magaalada   311  Joeytortasha Sunta   [phone]  Lifeline ka landy guzman   [phone] (TALK)  Jose suero ee Heribertodgmary Caruurta   [phone] (4-A-Child)  Jose yancey Galmada   [phone] (HOPE)  National Runaway Safeline   [phone] (RUNAWAY)  Jose Chiang   [phone]  Anam Hickey   [phone] (HELP)

## 2023-11-01 NOTE — COMMUNITY RESOURCES LIST (ENGLISH)
11/01/2023   [facility]  N/A  For questions about this resource list or additional care needs, please contact your primary care clinic or care manager.  Phone: [phone]   Email: N/A   Address: [address] [place] [address]   Hours: N/A        Hotlines and Helplines       Hotline - Housing crisis  1  [facility] Distance: 1.09 miles      Phone/Virtual   [address] [place] [address]  Language: English  Hours: Mon - Sun Open 24 Hours   Phone: [phone]) [phone] Email: info@[facility].org Website: http://www.[facility].org     2  [facility] Distance: 1.32 miles      Phone/Virtual   [address] [place] [address]  Language: English  Hours: Mon - Sun Open 24 Hours   Phone: [phone]) [phone] Email: communications@[facility].org Website: https://[facility]-[place].org/oursaviourshousing/          Housing       Coordinated Entry access point  3  [facility] [place] ([facility] - Housing Services Distance: 1.12 miles      In-Person   [address] [place] [address]  Language: English  Hours: Mon - Fri 9:00 AM - 5:00 PM  Fees: Free   Phone: (775) [phone] Email: housing@[facility].org Website: http://www.[facility].org/housing     4  [facility] - Adult group home [facility] Distance: 2.01 miles      In-Person   [address] [place] [address]  Language: English  Hours: Mon - Sat 10:00 PM - 5:00 PM  Fees: Free   Phone: ([phone]) [phone] Email: info@[facility].org Website: http://www.saintolaf.org/     Drop-in center or day shelter  5  [facility] Distance: 1.41 miles      In-Person   [address] [place] [address]  Language: English  Hours: Mon - Fri 12:00 PM - 3:00 PM  Fees: Free   Phone: ([phone]) [phone] Email: eSiliconcommunAmerican Biosurgical@Edifilm.Innoventureica Website: http://Zenkars.org/     6  [facility] Distance: 1.65 miles      In-Person   [address], [place] [address]  Language: English, Niuean, Indonesian  Hours: Mon - Sat 7:00 AM - 3:00 PM  Fees: Free, Self Pay   Phone: (843) 737-1475 Email: info@[employer]inkapp.Yulex Website: https://www.Winkapp.org/locations/opportunity-center/     Housing search assistance  7  [facility] ([facility]) Distance: 1.75 miles      In-Person   [address] [place] [address]  Language: English, Niuean, Indonesian  Hours: Mon - Fri 8:30 AM - 4:30 PM  Fees: Free   Phone: ([phone]) [phone] Email: gregory@[facility].org Website: http://www.[facility] Union[facility]pineReplaymn.org/     8  [facility] Office of Multicultural Services Distance: 1.99 miles      Phone/Virtual   [address] [place] [address]  Language: American Sign Language, Danish, Albanian, English, English, Isreal, Oromo, Algerian, Niuean, Indonesian, Swahili, David, Ukrainian  Hours: Mon - Tue 9:00 AM - 4:00 PM , Wed 10:00 AM - 5:00 PM , Thu - Fri 9:00 AM - 4:00 PM  Fees: Free   Phone: (404) 272-8701 Email: oms@[facility] Website: http://www.[facility]/residents/human-services/multi-cultural-services     Shelter for families  9  [facility] Distance: 20.25 miles      In-Person   [address] [place] [address]  Language: English  Hours: Mon - Fri 3:00 PM - 9:00 AM , Sat - Sun Open 24 Hours  Fees: Free   Phone: (413) 767-1046 Ext.1 Website: https://www.saintandrews.org/2020/07/03/emergency-family-shelter/     Shelter for individuals  10  [facility] Distance: 1.32 miles      In-Person   [address] [place] [address]  Language: English  Hours: Mon - Sun Open 24 Hours  Fees: Free   Phone: (670) 727-8530 Email: communications@oscs-mn.org Website: https://oscs-mn.org/oursaviourshousing/     11  [facility] Distance: 2.22 miles      In-Person   [address] [place] [address]  Language: English  Hours: Mon - Fri 4:00 PM - 9:00 AM  Fees: Free    Phone: (841) 114-3722 Email: kat@[facility].[employer].org Website: https://[facility] Hospitaly.org/[facility] Hospitaler/          Important Numbers & Websites       Emergency Services   911  [facility] Services   311  Poison Control   (340) 515-4403  Suicide Prevention Lifeline   (963) 777-2921 (TALK)  Child Abuse Hotline   (191) 800-5894 (4-A-Child)  Sexual Assault Hotline   (232) 968-4829 (HOPE)  National Runaway Safeline   (182) 314-3241 (RUNAWAY)  All-Options Talkline   (365) 174-2870  Substance Abuse Referral   (711) 275-3367 (HELP)

## 2023-11-01 NOTE — COMMUNITY RESOURCES LIST (PATIENT PREFERRED LANGUAGE)
11/01/2023   [facility]  N/A  Jacylaura jose sarkar , yeni arroyo.  Phone: [phone]   Email: N/A   Address: [address] [place] [address]   Hours: N/A        Jose Esquivel - Dhibaatada guriyeynta  1  [facility] Fogaanta: 1.09 miles      Telefoon/Virtual   [address] [place] [address]  Luuqad: Laureano Heredia: Isniinta - Axad Furan 24 [facility]   Phone: [phone]) [phone] Email: info@[facility] Website: http://www.[facility]     2  Gatodina Noelle - Jose currie  - Dhibaatada guriyaynta Fogaanta: 1.32 miles      Telefoon/Virtual   [address] [place] [address]  Luuqad: Laureano Mariedaha: Isniinta - Axad Furan 24 [facility]   Phone: [phone] Email: communications@[facility]-mn.org Website: https://[facility]-mn.org/oursaviourshousing/          Artieridonald reyna [facility] Hospitalu-xian  3  Adedavid Huerta Oriental orthodox [facility]) - Adeegyada Guriyeynta Fogaanta: 1.12 miles      [facility]   [address] [place] [address]  Luuqad: Laureano Heredia: Christianne Ramirez 9:00 AM - 5:00 PM  Ellie: Rylee   Phone: [phone] Email: housing@[facility].org Website: http://www.[facility].org/housing     4  Carol yancey Kingsley - Xidhiidhka Yogesh Posada Fogaanta: 2.01 miles      [facility]   [address] [place] [address]  Luuqad: Laureano Heredia: Christianne Oakes 10:00 PM - 5:00 PM  Haylieda: Rylee   Phone: [phone] Email: info@saintolaf.org Website: http://www.saintolaf.org/     Sherman tellez  5  Trishaha Marco Antonio Erickson Fogaanta: 1.41 miles      [facility]   [address] [place] [address]  Luuqad: Laureano Heredia: Christianne Ramirez 12:00 PM - 3:00 PM   Kharashyada: Bilaash   Phone: [phone] Email: numberFire@Riiid.Frontier pte Website: http://numberFire.org/     6  Roberto Ulises [facility] - Heribertonella Lynne Fogaanta: 1.65 miles      Qof ahaan   [address] [place] [address]  Luuqad: Laureano, Isbaanish, Soomaali  Saacadaha: Isniinta - Sabti 7:00 AM - 3:00 PM  Kharashyada: Bilaash, Is Bixinta   Phone: [phone]) [phone] Email: info@Friendsignia Website: https://www.Friendsignia/locations/opportunity-center/     Karolina soto  7  Artieaha La awood willie ee Online - https://Teravac/ Fogaanta: 2.28 miles      Telefoon/Virtual   [address] [place] [address]  Luuqad: Rubiois  Saacadaha: Isniinta - Axad Furan 24 [facility]   Email: info@Training Amigo Website: https://Teravac     8  HousingLink - Karolina soto [facility] Fogaanta: 2.38 miles      Telefoon/Virtual   [address] [address]  Luuqad: Adrianaong, Laureano, Isbaanish, Soomaali  Saacadaha: Isniinta - Axad Furan 24 [facility]   Phone: [phone] Email: info@[employer].org Website: http://www.housinglink.org/     Hoyga qoysaska  9  Hoyga Qoyska [facility] Fogaanta: 20.25 miles      Qof ahaan   [address] [place] [address]  Luuqad: Ingiriis  Saacadaha: Isniinta - Jimce 3:00 PM - 9:00 AM , Sabti - Axad Furan 24 [facility]  Kharashyada: Bilaash   Phone: [phone]) [phone] Ext.1 Website: https://www.saintandrews.org/2020/07/03/emergency-family-shelter/     Yogesh wang  10  Yogesh Drake Fogaanta: 1.32 miles      [facility]   [address] [place] [address]  Poojaad: Laureano Heredia: Christianne Gill 24 [facility]  Haylieda: Rylee   Phone: [phone] Email: communications@oscs-mn.org Website: https://oscs-mn.org/oursaviourshousing/     11  [facility] - HeribertoC[facility]ola General Hospitala Iftiimau Dekelaneya Fogaanta: 2.22 miles      Critical access hospitalan   [address] [place] [address]  Luuqad: Laureano  Giovanna: Christianne Ramirez 4:00 PM - 9:00 AM  Ellie: Jean-Claudeharvey   Phone: [phone] Email: kta@[facility].org Website: https://centralusa.[facility]y.org/[facility]/          Mansi Dennis  & Stephen       [facility]   211 211unitedway.org  Edwin maciel   [phone] Mnpoison.org  Is-dilid iyo Jose Montez   958 988lifeline.org  Jose Li [facility] Booker Robertha bc Portillo   [phone] Childhelphotline.org  Jose Li [facility] Sanchezda bc Mathews   [phone] (HOPE) Rainn.org  Jose Manueladashelby Mathews   [phone] (RUNAWAY) [address] [place] [address].org  Uurka & Taageerada Nancy Salomon [place]   Call/text [phone] Ppsupportmn.org  Jose Michel ([facility]   [phone] (7013) Findtreatment.gov  Adeevinda Analisa    183

## 2023-11-01 NOTE — PROGRESS NOTES
Assessment & Plan     Type 2 diabetes mellitus without complication, without long-term current use of insulin (H)  I am unsure about his medications. His granddaugher wishes to come back with all of his meds. Diet controlled for diabetes. Stick to same for now.   - Hemoglobin A1c; Future  - Hemoglobin A1c    Vitamin D deficiency  Patient is tolerating current medication without any major side effects of concerns and current dose seems reasonable too.  Current medication regime is effective. Continue current treatment without any changes.   - Vitamin D3 (CHOLECALCIFEROL) 25 mcg (1000 units) tablet; Take 2 tablets (50 mcg) by mouth daily    Hyperlipidemia LDL goal <100  Patient is tolerating current medication without any major side effects of concerns and current dose seems reasonable too.  Current medication regime is effective. Continue current treatment without any changes.   - atorvastatin (LIPITOR) 20 MG tablet; Take 1 tablet (20 mg) by mouth daily    Gastroesophageal reflux disease without esophagitis   Patient is tolerating current medication without any major side effects of concerns and current dose seems reasonable too.  Current medication regime is effective. Continue current treatment without any changes.   - omeprazole (PRILOSEC) 20 MG DR capsule; TAKE ONE CAPSULE BY MOUTH ONCE DAILY EVERY MORNING ONE-HALF HOUR BEFORE A MEAL    Essential tremor  We agreed to hold off on any intervention until next visit.     Chronic low back pain with sciatica, sciatica laterality unspecified, unspecified back pain laterality  Some radiculopathy. Reviewed recent ER visit, images. Got medrol dose pack which was helpful. Discussed not ideal to keep using it on regular basis. Gabapentin discussed and he agreed. Side effects discussed specially with his age, language barrier and possible some dementia.   - gabapentin (NEURONTIN) 100 MG capsule; Take 1 capsule (100 mg) by mouth 3 times daily as needed (pain)            "  BMI:   Estimated body mass index is 27.14 kg/m  as calculated from the following:    Height as of this encounter: 1.594 m (5' 2.75\").    Weight as of this encounter: 68.9 kg (152 lb).            Samuel Lowe MD, MD  [facility] TEODORA Barry is a 88 year old, presenting for the following health issues:  Fall      11/1/2023     3:37 PM   Additional Questions   Roomed by Vanessa BATEMAN   Accompanied by Grand daughter       History of Present Illness       Hypertension: He presents for follow up of hypertension.  He does check blood pressure  regularly outside of the clinic. Outpatient blood pressures have not been over 140/90. He follows a low salt diet.     He eats 2-3 servings of fruits and vegetables daily.He consumes 1 sweetened beverage(s) daily.He exercises with enough effort to increase his heart rate 10 to 19 minutes per day.  He exercises with enough effort to increase his heart rate 3 or less days per week.   He is taking medications regularly.     Here with granddaughter.   Phone  used.   Does not know his medications.     Fall and right hip pain. Fall few weeks ago.   No loss of consciousness or head injury.   Went to ER -[facility].   Got medrol dose pack - it was helpful.   Pain came back after stopping that medication.   Taking tylenol sometime.   Blood pressure - has aid and they helps with medication.   Each time goes to pharmacy - does not have prescription.     Tremors - took some medication. Hard to hold stuff. Left hand is getting shaky.     Review of Systems         Objective    BP (!) 163/74 (BP Location: Right arm, Patient Position: Sitting, Cuff Size: Adult Regular)   Pulse 57   Temp 97.9  F (36.6  C) (Temporal)   Resp 17   Ht 1.594 m (5' 2.75\")   Wt 68.9 kg (152 lb)   SpO2 96%   BMI 27.14 kg/m    Body mass index is 27.14 kg/m .  Physical Exam                         "

## 2023-11-01 NOTE — COMMUNITY RESOURCES LIST (ENGLISH)
11/01/2023   [facility] - Outpatient Clinics  N/A  For additional resource needs, please contact your health insurance member services or your primary care team.  Phone: [phone]   Email: N/A   Address: [address] [place] [address]   Hours: N/A        Hotlines and Helplines       Hotline - Housing crisis  1  [facility] Distance: 1.09 miles      Phone/Virtual   [address] [place] [address]  Language: English  Hours: Mon - Sun Open 24 Hours   Phone: (597) 235-6190 Email: info@[facility].[employer] Website: http://www.[facility].org     2  [facility] Distance: 1.32 miles      Phone/Virtual   [address] [place] [address]  Language: English  Hours: Mon - Sun Open 24 Hours   Phone: (958) 187-6432 Email: communications@[facility].org Website: https://[facility].org/oursaviourshousing/          Housing       Coordinated Entry access point  3  [facility] [place] ([facility] - Housing Services Distance: 1.12 miles      In-Person   [address] [place] [address]  Language: English  Hours: Mon - Fri 9:00 AM - 5:00 PM  Fees: Free   Phone: (603) 654-3412 Email: housing@[facility].org Website: http://www.[facility].org/housing     4  [facility] - Adult custodial [facility] Distance: 2.01 miles      In-Person   [address] [place] [address]  Language: English  Hours: Mon - Sat 10:00 PM - 5:00 PM  Fees: Free   Phone: (168) 621-5584 Email: info@[facility] Website: http://www.saintolaf.org/     Drop-in center or day shelter  5  [facility] Distance: 1.41 miles      In-Person   [address] [place] [address]  Language: English  Hours: Mon - Fri 12:00 PM - 3:00 PM  Fees: Free   Phone: (589) 237-3003 Email: H.BLOOMmundevsisters@"Shahab P[employer] Tabatabai[employer] Broker".[employer] Website: http://Leonar[employer]Do.org/     6  [facility] Distance: 1.65 miles      In-Person   [address] [place] [address]  Language: English, Tanzanian, Latvian  Hours: Mon - Sat 7:00 AM - 3:00 PM  Fees: Free, Self Pay   Phone: (666) 436-4824 Email: info@[employer] Website: https://www.[employer].org/locations/opportunity-center/     Housing search assistance  7  [employer] - https://[employer]/ Distance: 2.28 miles      Phone/Virtual   [address] [place] [address]  Language: English  Hours: Mon - Sun Open 24 Hours   Email: info@[employer] Website: https://[employer]     8  [employer] - Online housing search assistance Distance: 2.38 miles      Phone/Virtual   [address] [address]  Language: English, Hmong, Tanzanian, Latvian  Hours: Mon - Sun Open 24 Hours   Phone: (221) 725-5072 Email: info@housinglink.org Website: http://www.housinglink.org/     Shelter for families  9  [facility] Distance: 20.25 miles      In-Person   [address] [place] [address]  Language: English  Hours: Mon - Fri 3:00 PM - 9:00 AM , Sat - Sun Open 24 Hours  Fees: Free   Phone: (156) 597-6420 Ext.1 Website: https://www.saintandrews.org/2020/07/03/emergency-family-shelter/     Shelter for individuals  10  [facility] Distance: 1.32 miles      In-Person   [address] [place] [address]  Language: English  Hours: Mon - Sun Open 24 Hours  Fees: Free   Phone: (263) 314-9559 Email: communications@[facility].org Website: https://[facility].org/oursaviourshousing/     11  [facility] Distance: 2.22 miles      In-Person   [address] [place] [address]  Language: English  Hours: Mon - Fri 4:00 PM - 9:00 AM  Fees: Free   Phone: (279) 978-6883 Email: kat@[facility].org Website: https://[facility].org/[facility]/          Important Numbers & Websites       [facility]   [address] [place] [address].[facility]  Poison Control   (744) 681-1010  [place]po[place].org  Suicide and Crisis Lifeline   988 988lifeline.org  Childhelp [place] Child Abuse Hotline   329.451.5787 Childhelphotline.org  [place] Sexual Assault Hotline   (215) 485-2878 (HOPE) Rainn.org  [place] Runaway Safeline   (335) 766-1630 (RUNAWAY) 1800runaway.org  Pregnancy & Postpartum Support [place]   Call/text 597-282-4325 Ppsupport[place].org  Substance Abuse National Helpline ([facility]   712-858-HELP (6466) Findtreatment.gov  Emergency Services   911

## 2023-11-09 NOTE — PATIENT INSTRUCTIONS
(Z13.89) Screening for diabetic peripheral neuropathy  (primary encounter diagnosis)  Comment:    Plan: FOOT EXAM  NO CHARGE [58869.114]             (R07.89) Other chest pain  Comment:    Plan: EKG 12-lead complete w/read - Clinics, Albumin         Random Urine Quantitative with Creat Ratio             (E11.9) Type 2 diabetes mellitus without complication, without long-term current use of insulin (H)  Comment:    Plan: HEMOGLOBIN A1C             (E78.5) Hyperlipidemia LDL goal <100  Comment:    Plan: Lipid panel reflex to direct LDL Fasting, ALT,         atorvastatin (LIPITOR) 20 MG tablet             (I10) Hypertension goal BP (blood pressure) < 140/80  Comment:    Plan: Basic metabolic panel             (J34.0) Nasal furuncle  Comment:    Plan: mupirocin (BACTROBAN) 2 % nasal ointment             (R06.2) Wheezing  Comment:    Plan: albuterol (PROAIR HFA/PROVENTIL HFA/VENTOLIN         HFA) 108 (90 Base) MCG/ACT Inhaler             (K21.9) Gastroesophageal reflux disease without esophagitis  Comment:    Plan: omeprazole (PRILOSEC) 20 MG CR capsule             (H04.123) Dry eyes, bilateral  Comment:    Plan: polyethylene glycol 0.4%- propylene glycol 0.3%        (SYSTANE) 0.4-0.3 % SOLN ophthalmic solution             (H04.123) Dry eyes  Comment:    Plan: polyvinyl alcohol (ARTIFICIAL TEARS) 1.4 %         ophthalmic solution             (R30.0) Dysuria  Comment:    Plan: tamsulosin (FLOMAX) 0.4 MG capsule             (S80.822A) Blister of leg, left, initial encounter  Comment:    Plan: triamcinolone (KENALOG) 0.1 % cream             (E55.9) Vitamin D deficiency  Comment:    Plan: vitamin D (ERGOCALCIFEROL) 85683 UNIT capsule             (H92.02) Otalgia of left ear  Comment:    Plan: neomycin-polymyxin-hydrocortisone (CORTISPORIN)        3.5-87556-0 otic suspension             (I10) Essential hypertension, benign  Comment:    Plan: metoprolol succinate (TOPROL-XL) 50 MG 24 hr         tablet             (M54.40,   G89.29) Chronic low back pain with sciatica, sciatica laterality unspecified, unspecified back pain laterality  Comment:    Plan: acetaminophen (MAPAP) 500 MG tablet             (M25.561) Acute pain of right knee  Comment:    Plan: acetaminophen (MAPAP) 500 MG tablet             (M54.40,  G89.29) Chronic right-sided low back pain with sciatica, sciatica laterality unspecified  Comment:    Plan: lidocaine (XYLOCAINE) 5 % ointment             (M54.2) Cervicalgia  Comment:    Plan: gabapentin (NEURONTIN) 100 MG capsule             (R39.11) Hesitancy of micturition  Comment:     Plan: finasteride (PROSCAR) 5 MG tablet                  (R25.1) Tremor  Comment:    Plan: carbidopa-levodopa (SINEMET)  MG per         tablet             \   Cimzia Pregnancy And Lactation Text: This medication crosses the placenta but can be considered safe in certain situations. Cimzia may be excreted in breast milk.

## 2023-12-04 ENCOUNTER — OFFICE VISIT (OUTPATIENT)
Dept: FAMILY MEDICINE | Facility: CLINIC | Age: 88
End: 2023-12-04
Payer: COMMERCIAL

## 2023-12-04 VITALS
TEMPERATURE: 98.5 F | HEART RATE: 74 BPM | BODY MASS INDEX: 26.81 KG/M2 | WEIGHT: 151.3 LBS | HEIGHT: 63 IN | SYSTOLIC BLOOD PRESSURE: 136 MMHG | RESPIRATION RATE: 16 BRPM | DIASTOLIC BLOOD PRESSURE: 70 MMHG

## 2023-12-04 DIAGNOSIS — J06.9 UPPER RESPIRATORY TRACT INFECTION, UNSPECIFIED TYPE: ICD-10-CM

## 2023-12-04 DIAGNOSIS — R39.11 HESITANCY OF MICTURITION: ICD-10-CM

## 2023-12-04 DIAGNOSIS — R41.3 MEMORY LOSS: Primary | ICD-10-CM

## 2023-12-04 PROCEDURE — 87635 SARS-COV-2 COVID-19 AMP PRB: CPT | Performed by: FAMILY MEDICINE

## 2023-12-04 PROCEDURE — 99214 OFFICE O/P EST MOD 30 MIN: CPT | Performed by: FAMILY MEDICINE

## 2023-12-04 RX ORDER — RESPIRATORY SYNCYTIAL VIRUS VACCINE 120MCG/0.5
0.5 KIT INTRAMUSCULAR ONCE
Qty: 1 EACH | Refills: 0 | Status: CANCELLED | OUTPATIENT
Start: 2023-12-04 | End: 2023-12-04

## 2023-12-04 RX ORDER — FINASTERIDE 5 MG/1
5 TABLET, FILM COATED ORAL DAILY
Qty: 90 TABLET | Refills: 1 | Status: SHIPPED | OUTPATIENT
Start: 2023-12-04 | End: 2024-06-07

## 2023-12-04 ASSESSMENT — PAIN SCALES - GENERAL: PAINLEVEL: NO PAIN (0)

## 2023-12-04 NOTE — PROGRESS NOTES
"  Assessment & Plan     Memory loss  Lives alone.  Family supportive.  Aware of most medications but does not know which medication is for what.  I still believe that he would benefit from using home health nurse evaluation.  Family member agrees.  They are hoping if nurse also speaks [place]an language is language barrier previously has been a concern.  Discussed I cannot guarantee but I will put in note.  They brought all of the meds and I reviewed his rx. Not using gabapentin and tamsulosin.   - Home Care Referral    Upper respiratory tract infection, unspecified type  Will obtain COVID test.  Overall feeling okay.  Exam is benign.  - Symptomatic COVID-19 Virus (Coronavirus) by PCR Nose    Hesitancy of micturition  Not been using tamsulosin and feels symptoms are better controlled with just finasteride alone.  He can use both but he does not think it is necessary.  - finasteride (PROSCAR) 5 MG tablet; Take 1 tablet (5 mg) by mouth daily    Samuel Lowe MD, MD  [facility]    Terri Barry is a 88 year old, presenting for the following health issues:  RECHECK      12/4/2023     3:29 PM   Additional Questions   Roomed by Heena SMITH   Accompanied by son in law, ronda HAGEN     Here with all the pills.     Does not take gabapentin and tamsulosin.     Here with his granddaughters .     Lives alone. Usually family members helps.     Granddaughter helps with medications. Her  is here today with patient.     Some cold symptoms lately.     Some hand shakiness. Does not think he would like to try any medication.     Review of Systems         Objective    /70 (BP Location: Right arm, Patient Position: Sitting, Cuff Size: Adult Regular)   Pulse 74   Temp 98.5  F (36.9  C) (Temporal)   Resp 16   Ht 1.594 m (5' 2.75\")   Wt 68.6 kg (151 lb 4.8 oz)   BMI 27.02 kg/m    Body mass index is 27.02 kg/m .  Physical Exam   Throat minimal erythema.   No cervical " adenopathy. Lungs clear. Heart RRR

## 2023-12-05 ENCOUNTER — TELEPHONE (OUTPATIENT)
Dept: FAMILY MEDICINE | Facility: CLINIC | Age: 88
End: 2023-12-05
Payer: COMMERCIAL

## 2023-12-05 LAB — SARS-COV-2 RNA RESP QL NAA+PROBE: POSITIVE

## 2023-12-05 NOTE — TELEPHONE ENCOUNTER
Please call family with [facility] .(Patient has mild dementia). Granddaughter Amie is primary care contact.   Patient is positive for COVID.  His close contact should be tested.  He should also follow isolation guidelines until he is symptom-free.  He is beyond COVID treatment window and he should continue symptomatic care.

## 2023-12-05 NOTE — TELEPHONE ENCOUNTER
ID [id number]    Daughter Ruben answered. Relayed covid positive result, recommendation that anyone in close contact also be tested, and reviewed isolation guidelines. Ruben verbalized agreement to relay test results to patient and family members who have had close contact, and repeated back appropriate understanding of 5 day isolation guidelines. Ruben verbalized no other questions at this time.    MALIK RuizN, RN  [facility]

## 2023-12-10 DIAGNOSIS — I10 HYPERTENSION GOAL BP (BLOOD PRESSURE) < 140/80: ICD-10-CM

## 2023-12-11 RX ORDER — METOPROLOL SUCCINATE 50 MG/1
50 TABLET, EXTENDED RELEASE ORAL DAILY
Qty: 90 TABLET | Refills: 1 | Status: SHIPPED | OUTPATIENT
Start: 2023-12-11 | End: 2024-06-12

## 2023-12-15 ENCOUNTER — PATIENT OUTREACH (OUTPATIENT)
Dept: GERIATRIC MEDICINE | Facility: CLINIC | Age: 88
End: 2023-12-15
Payer: COMMERCIAL

## 2023-12-15 NOTE — PROGRESS NOTES
[facility] Care Coordination Contact    12/12/2023:  Received notification via in-basket that member needs home care referral /SNVto assist with medication management.  [facility] unable to take patient due to capacity.    Placed a call to [facility] [employer] [phone](phone)  Fax:  [phone]  and they have availability.  Placed a call to member and member's  daughter and left VM requesting a call back.  12/15/2023: Placed a call to member and member's daughter, connected call via three way call.  Reviewed need for homecare/SNV.  Member states he has his son Laron who is now staying with him including overnight.  Member stated son will be helping with medication and is declining homecare/SNV referral.   Reviewed that PCP discussed the need for SNV to help with medication during last office visit.  Member stated that at that time he was open to home care but now has son living with him and he will be helping with medication.  Member continues to deny referral SNV at this time after multiple attempts/discussion.  Member was recently diagnosed with Covid-19 and his symptom have improved.  Daphne Ochoa RN BSN PHN      [facility]  Care Coordinator  Phone:   [phone]  Fax:       [phone]

## 2024-01-06 NOTE — PROGRESS NOTES
[facility] Care Coordination Contact    Received after visit chart from care coordinator.  Completed following tasks: Mailed copy of care plan to client, Updated services in access and Submitted referrals/auths for ADC and Transportation   , Provider Signature - No POC Shared:  Member indicates that they do not want their POC shared with any EW providers.    [facility]:  Emailed completed PCA assessment to [facility].  Faxed copy of PCA assessment to PCA Agency and mailed copy to member.  Faxed MD Communication to PCP.     **THIS ASSESSMENT WAS DONE OVER THE PHONE. SENT POC/PCA SIG PAGES TO MEMBER ASKING THEM TO SIGN AND RETURN IN SASE**    Jennifer Ennis  Care Management Specialist  [facility]  [phone]       

## 2024-02-09 ENCOUNTER — PATIENT OUTREACH (OUTPATIENT)
Dept: GERIATRIC MEDICINE | Facility: CLINIC | Age: 89
End: 2024-02-09
Payer: COMMERCIAL

## 2024-02-09 NOTE — PROGRESS NOTES
"[facility] Care Coordination Contact  CC received notification of Emergency Room visit via in-basket message:    \"Mee Adair Halimo S, RN Hirsi Juvenal had an ER visit on 2/7/24 at [facility].\"  Dx:  Back pain with right-sided sciatica (Primary Dx);  Right hip pain   CC contacted member and reviewed discharge summary.  Member reports he fell outside home twice, most recent is before he went to the ED.  Member has a follow-up appointment with PCP: Yes: scheduled on 02/13/2024.  Future Appointments   Date Time Provider Department Center   2/13/2024  1:00 PM Samuel Lowe MD [facility]   Home Visit Needed: Yes: for  annual home visit and is scheduled for 02/16/2024  PCP notified of ED visit via EMR.  Daphne Ochoa RN BSN PHN      [facility]  Care Coordinator  Phone:   [phone]  Fax:       [phone]            "

## 2024-02-13 ENCOUNTER — OFFICE VISIT (OUTPATIENT)
Dept: FAMILY MEDICINE | Facility: CLINIC | Age: 89
End: 2024-02-13
Payer: COMMERCIAL

## 2024-02-13 VITALS
OXYGEN SATURATION: 97 % | TEMPERATURE: 98.1 F | BODY MASS INDEX: 27.48 KG/M2 | HEART RATE: 71 BPM | DIASTOLIC BLOOD PRESSURE: 79 MMHG | RESPIRATION RATE: 19 BRPM | SYSTOLIC BLOOD PRESSURE: 161 MMHG | WEIGHT: 155.1 LBS | HEIGHT: 63 IN

## 2024-02-13 DIAGNOSIS — M25.551 HIP PAIN, RIGHT: Primary | ICD-10-CM

## 2024-02-13 DIAGNOSIS — M54.16 LUMBAR RADICULOPATHY, RIGHT: ICD-10-CM

## 2024-02-13 PROCEDURE — 99214 OFFICE O/P EST MOD 30 MIN: CPT | Performed by: FAMILY MEDICINE

## 2024-02-13 RX ORDER — MELOXICAM 7.5 MG/1
7.5 TABLET ORAL DAILY
Qty: 14 TABLET | Refills: 0 | Status: SHIPPED | OUTPATIENT
Start: 2024-02-13

## 2024-02-13 RX ORDER — RESPIRATORY SYNCYTIAL VIRUS VACCINE 120MCG/0.5
0.5 KIT INTRAMUSCULAR ONCE
Qty: 1 EACH | Refills: 0 | Status: CANCELLED | OUTPATIENT
Start: 2024-02-13 | End: 2024-02-13

## 2024-02-13 RX ORDER — ACETAMINOPHEN 500 MG
TABLET ORAL
Qty: 100 TABLET | Refills: 5 | Status: SHIPPED | OUTPATIENT
Start: 2024-02-13

## 2024-02-13 ASSESSMENT — PAIN SCALES - GENERAL: PAINLEVEL: WORST PAIN (10)

## 2024-02-13 NOTE — PROGRESS NOTES
Assessment & Plan     Hip pain, right  He has history of right radiculopathy.  I suspect his symptoms could be related to there.  He recently has had both CT spine and hip in the ER.  If was not suggestive of fracture.  We discussed low-dose of Mobic for 2 weeks and see how he does.  Long-term not ideal for his age and renal function needs to be monitored.  His blood pressure is also somewhat on higher side.  Patient and family understood.  Physical therapy discussed.  If symptom fails to improve will refer him to sports meds.  We also discussed trial of Medrol Dosepak.  He is somewhat concerned about the side effect and he does not want to pursue.  - acetaminophen (MAPAP) 500 MG tablet; TAKE 1 TABLET BY MOUTH EVERY 6 HOURS AS NEEDED  - Physical Therapy Referral; Future  - meloxicam (MOBIC) 7.5 MG tablet; Take 1 tablet (7.5 mg) by mouth daily  - Orthopedic  Referral; Future    Lumbar radiculopathy, right  See above  - acetaminophen (MAPAP) 500 MG tablet; TAKE 1 TABLET BY MOUTH EVERY 6 HOURS AS NEEDED  - Physical Therapy Referral; Future  - meloxicam (MOBIC) 7.5 MG tablet; Take 1 tablet (7.5 mg) by mouth daily  - Orthopedic  Referral; Future          MED REC REQUIRED  Post Medication Reconciliation Status:  Discharge medications reconciled, continue medications without change        Subjective   Asha is a 89 year old, presenting for the following health issues:  Hospital F/U (Requesting for ibuprofen for pain)      2/13/2024    12:53 PM   Additional Questions   Roomed by Vanessa MCKENNA   Accompanied by Daughter and grand daughter     History of Present Illness       Reason for visit:  Fall          ED/UC Followup:    Facility:  [facility]  Date of visit: 02/07/2024  Reason for visit: Fall with hip and leg pain.  Current Status: Pain is getting worst and pain into the bottom of foot.    Was in the ER with fall. Her with his family.     Phone  used.      Twisted leg and hip was  "hurt. Still hurts. Getting worse.     In the ER had an xray and ct scan of hip and lumbar spine.     Was sent home with muscle relaxar.     Walking with cane.     Wondering about physical therapy.              Objective    BP (!) 153/84 (BP Location: Right arm, Patient Position: Sitting, Cuff Size: Adult Regular)   Pulse 71   Temp 98.1  F (36.7  C) (Oral)   Resp 19   Ht 1.59 m (5' 2.6\")   Wt 70.4 kg (155 lb 1.6 oz)   SpO2 97%   BMI 27.83 kg/m    Body mass index is 27.83 kg/m .  Physical Exam   Hard to get up from chair.  Right lumbar and right paraspinal muscle diffuse tenderness, right SI joint diffuse tenderness.             Signed Electronically by: Samuel Lowe MD, MD    "

## 2024-02-16 ENCOUNTER — PATIENT OUTREACH (OUTPATIENT)
Dept: GERIATRIC MEDICINE | Facility: CLINIC | Age: 89
End: 2024-02-16
Payer: COMMERCIAL

## 2024-02-16 NOTE — PROGRESS NOTES
[facility] Care Coordination Contact    Called member to schedule annual HRA home visit. HRA has been scheduled for 02/21/2024.  Daphne Ochoa RN BSN PHN      [facility]  Care Coordinator  Phone:   [phone]  Fax:       [phone]

## 2024-02-21 ENCOUNTER — PATIENT OUTREACH (OUTPATIENT)
Dept: GERIATRIC MEDICINE | Facility: CLINIC | Age: 89
End: 2024-02-21

## 2024-02-21 ASSESSMENT — ACTIVITIES OF DAILY LIVING (ADL): DEPENDENT_IADLS:: CLEANING;COOKING;LAUNDRY;SHOPPING;MEAL PREPARATION;MEDICATION MANAGEMENT;TRANSPORTATION

## 2024-02-22 NOTE — PROGRESS NOTES
[facility] Care Coordination Contact    [facility] Home Visit Assessment     Home visit for Health Risk Assessment with Asha Sanford completed on February 21st, 2024    Type of residence:: Apartment  Current living arrangement:: I live alone     Assessment completed with:: Patient    Current Care Plan  Member currently receiving the following home care services:   N/a  Member currently receiving the following community resources: Day Care, PCA, Homemaking and monthly supply of incontinent products.      Medication Review  Medication reconciliation completed in Epic: Yes  Medication set-up & administration: Family/informal caregiver sets up daily.  Family caregiver administers medications.  Medication Risk Assessment Medication (1 or more, place referral to MTM): Taking 1 or more high-risk medications for adults >65 years  MTM Referral Placed: No: Reviewed benefit only    Mental/Behavioral Health   Depression Screening:      PHQ-9 Total Score: 5    Mental health DX:: Yes (Insomnia)        ADL/IADL Dependencies:   Dependent ADLs:: Ambulation-walker, Bathing, Dressing, Grooming, Transfers  Dependent IADLs:: Cleaning, Cooking, Laundry, Shopping, Meal Preparation, Medication Management, Transportation    Health Plan sponsored benefits: [facility]: Shared information regarding [employer] Program. Reviewed preventative health screening and health plan supplemental benefits/incentives. Reviewed medication disposal form.    PCA Assessment completed at visit: Yes Annual PCA assessment indicated 3.5 hours per day of PCA. This is the same as the previous assessment.     Elderly Waiver Eligibility: Yes-will continue on EW    Care Plan & Recommendations: Member to continue current POC,.    See LTCC for detailed assessment information.    Follow-Up Plan: Member informed of future contact, plan to f/u with member with a 6 month telephone assessment.  Contact information shared with member and family,  encouraged member to call with any questions or concerns at any time.    [place] care continuum providers: Please see Snapshot and Care Management Flowsheets for Specific details of care plan.    This CC note routed to PCP, Samuel Lowe.    Daphne Ochoa RN BSN PHN      [facility]  Care Coordinator  Phone:   [phone]  Fax:       [phone]

## 2024-03-04 ENCOUNTER — PATIENT OUTREACH (OUTPATIENT)
Dept: GERIATRIC MEDICINE | Facility: CLINIC | Age: 89
End: 2024-03-04
Payer: COMMERCIAL

## 2024-03-04 NOTE — PROGRESS NOTES
[facility] Care Coordination Contact    Received after visit chart from care coordinator.  Completed following tasks: Mailed copy of care plan/support plan to member, Mailed Safe Medication Disposal , Submitted referrals/auths for ADC, transportation and homemaking, and Updated services in Database  , Provider Signature - No POC Shared:  Member indicates that they do not want their POC shared with any EW providers.    [facility]:  Emailed required PCA documents to [facility].  Faxed copy of PCA assessment to PCA Agency and mailed copy to member.  Faxed MD Communication to PCP.     Jennifer Ennis  Care Management Specialist  [facility]  [phone]

## 2024-03-04 NOTE — LETTER
March 4, 2024      ALFREDO ALEMAN  [address]  [place] [address]      Dear Alfredo:    At [facility], we re dedicated to improving your health and wellness. Enclosed is the Care Plan developed with you on 2/21/2024. Please review the Care Plan carefully.    As a reminder, during your visit we talked about:  Ways to manage your physical and mental health  Using health care to maintain and improve your health   Your preventive care needs     Remember to contact your care coordinator if you:  Are hospitalized, or plan to be hospitalized   Have a fall    Have a change in your physical or mental health  Need help finding support or services    If you have questions, or don t agree with your Care Plan, call me at [phone]. You can also call me if your needs change. TTY users, call the [place] Relay at (558) or 1-241.828.3228 (jdtwrb-gs-ejjilc relay service).    Sincerely,    CRISTY Middleton, RN, PHN    E-mail: [email or web address]  Phone: [phone]      [facility] ([facility]) is a health plan that contracts with both Medicare and the [place] Medical Assistance (Medicaid) program to provide benefits of both programs to enrollees. Enrollment in [facility] depends on contract renewal.    [id number] accepted    [id number] (07/2022)

## 2024-04-06 ASSESSMENT — PATIENT HEALTH QUESTIONNAIRE - PHQ9: SUM OF ALL RESPONSES TO PHQ QUESTIONS 1-9: 5

## 2024-05-09 NOTE — LETTER
Annette 10, 2021      ALFREDO ALEMAN  [address]  [facility] [address]      Dear Alfredo:    At [facility], we are dedicated to improving your health and well-being. Enclosed is the Comprehensive Care Plan that we developed with you on 6/3/2021. Please review the Care Plan carefully.    As a reminder, some of the things we discussed at your visit include:    Your physical and mental health    Ways to reduce falls    Health care needs you may have    Don t forget to contact your care coordinator if you:    Have been hospitalized or plan to be hospitalized     Have had a fall     Have experienced a change in physical health    Are experiencing emotional problems     If you do not agree with your Care Plan, have questions about it, or have experienced a change in your needs, please call me at [phone]. If you are hearing impaired, please call the [place] Relay at 379 or [phone] (jtouzr-zj-utgwgo relay service).    Sincerely,    CRISTY Middleton, RN, PHN    E-mail: [email or web address]  Phone: [phone]      [facility] ([facility]) is a health plan that contracts with both Medicare and the [place] Medical Assistance (Medicaid) program to provide benefits of both programs to enrollees. Enrollment in [facility] depends on contract renewal.    [id number])     [id number] (11/18)                         Satisfactory

## 2024-05-21 DIAGNOSIS — E78.5 HYPERLIPIDEMIA LDL GOAL <100: Chronic | ICD-10-CM

## 2024-05-22 RX ORDER — ATORVASTATIN CALCIUM 20 MG/1
20 TABLET, FILM COATED ORAL DAILY
Qty: 90 TABLET | Refills: 1 | Status: SHIPPED | OUTPATIENT
Start: 2024-05-22

## 2024-06-07 DIAGNOSIS — R39.11 HESITANCY OF MICTURITION: ICD-10-CM

## 2024-06-07 RX ORDER — FINASTERIDE 5 MG/1
5 TABLET, FILM COATED ORAL DAILY
Qty: 90 TABLET | Refills: 1 | Status: SHIPPED | OUTPATIENT
Start: 2024-06-07

## 2024-06-07 NOTE — TELEPHONE ENCOUNTER
Prescription approved per [facility] Refill Protocol.  Fiona Esquivel, RN  [facility] Triage Nurse

## 2024-06-12 DIAGNOSIS — I10 HYPERTENSION GOAL BP (BLOOD PRESSURE) < 140/80: ICD-10-CM

## 2024-06-12 RX ORDER — METOPROLOL SUCCINATE 50 MG/1
50 TABLET, EXTENDED RELEASE ORAL DAILY
Qty: 90 TABLET | Refills: 1 | Status: SHIPPED | OUTPATIENT
Start: 2024-06-12

## 2024-08-30 ENCOUNTER — PATIENT OUTREACH (OUTPATIENT)
Dept: GERIATRIC MEDICINE | Facility: CLINIC | Age: 89
End: 2024-08-30
Payer: COMMERCIAL

## 2024-08-30 NOTE — PROGRESS NOTES
[facility] Care Coordination Contact      [facility] Six-Month Telephone Assessment    6 month telephone assessment completed on 08/30/2024.    ER visits: No  Hospitalizations: No  TCU stays: No  Significant health status changes: N/A  Falls/Injuries: No  ADL/IADL changes: No  Changes in services: No    Caregiver Assessment follow up:  N/A    Goals: See Support Plan for goal progress documentation.      Member is requesting bus pass.  Reviewed POC and able to accommodate bus pass.  Requested CMS to send bus pass request to [facility].    Will see member in 6 months for an annual health risk assessment.   Encouraged member to call CC with any questions or concerns in the meantime.    Daphne Ochoa RN BSN PHN      [facility]  Care Coordinator  Phone:   [phone]  Fax:       [phone]

## 2024-09-13 NOTE — PROGRESS NOTES
[facility] Care Coordination Contact      [facility] Six-Month Telephone Assessment    6 month telephone assessment completed on 10/31/2023.    ER visits: Yes -  [facility]   Hospitalizations: No  TCU stays: No  Significant health status changes: N/A  Falls/Injuries: Yes: Reports fall.  Had back pain then went to ED.  ADL/IADL changes: No  Changes in services: No    Caregiver Assessment follow up:  N/A    Goals: See POC in chart for goal progress documentation.      Will see member in 6 months for an annual health risk assessment.   Encouraged member to call CC with any questions or concerns in the meantime.     Daphne Ochoa RN BSN PHN      [facility]  Care Coordinator  Phone:   [phone]  Fax:       [phone]       related to need for educational review of diet

## 2024-09-18 DIAGNOSIS — K21.9 GASTROESOPHAGEAL REFLUX DISEASE WITHOUT ESOPHAGITIS: Chronic | ICD-10-CM

## 2024-10-08 ENCOUNTER — OFFICE VISIT (OUTPATIENT)
Dept: FAMILY MEDICINE | Facility: CLINIC | Age: 89
End: 2024-10-08
Payer: COMMERCIAL

## 2024-10-08 VITALS
DIASTOLIC BLOOD PRESSURE: 72 MMHG | RESPIRATION RATE: 13 BRPM | BODY MASS INDEX: 28.01 KG/M2 | WEIGHT: 158.1 LBS | HEIGHT: 63 IN | TEMPERATURE: 97.4 F | OXYGEN SATURATION: 95 % | SYSTOLIC BLOOD PRESSURE: 140 MMHG | HEART RATE: 57 BPM

## 2024-10-08 DIAGNOSIS — E11.9 TYPE 2 DIABETES MELLITUS WITHOUT COMPLICATION, WITHOUT LONG-TERM CURRENT USE OF INSULIN (H): Primary | ICD-10-CM

## 2024-10-08 DIAGNOSIS — R51.9 CHRONIC NONINTRACTABLE HEADACHE, UNSPECIFIED HEADACHE TYPE: ICD-10-CM

## 2024-10-08 DIAGNOSIS — I10 HYPERTENSION GOAL BP (BLOOD PRESSURE) < 140/80: ICD-10-CM

## 2024-10-08 DIAGNOSIS — G89.29 CHRONIC NONINTRACTABLE HEADACHE, UNSPECIFIED HEADACHE TYPE: ICD-10-CM

## 2024-10-08 LAB
ERYTHROCYTE [DISTWIDTH] IN BLOOD BY AUTOMATED COUNT: 12.8 % (ref 10–15)
EST. AVERAGE GLUCOSE BLD GHB EST-MCNC: 114 MG/DL
HBA1C MFR BLD: 5.6 % (ref 0–5.6)
HCT VFR BLD AUTO: 42.5 % (ref 40–53)
HGB BLD-MCNC: 13.5 G/DL (ref 13.3–17.7)
MCH RBC QN AUTO: 29.8 PG (ref 26.5–33)
MCHC RBC AUTO-ENTMCNC: 31.8 G/DL (ref 31.5–36.5)
MCV RBC AUTO: 94 FL (ref 78–100)
PLATELET # BLD AUTO: 236 10E3/UL (ref 150–450)
RBC # BLD AUTO: 4.53 10E6/UL (ref 4.4–5.9)
WBC # BLD AUTO: 5.6 10E3/UL (ref 4–11)

## 2024-10-08 PROCEDURE — 99214 OFFICE O/P EST MOD 30 MIN: CPT | Performed by: FAMILY MEDICINE

## 2024-10-08 PROCEDURE — G2211 COMPLEX E/M VISIT ADD ON: HCPCS | Performed by: FAMILY MEDICINE

## 2024-10-08 PROCEDURE — 85027 COMPLETE CBC AUTOMATED: CPT | Performed by: FAMILY MEDICINE

## 2024-10-08 PROCEDURE — 36415 COLL VENOUS BLD VENIPUNCTURE: CPT | Performed by: FAMILY MEDICINE

## 2024-10-08 PROCEDURE — 82607 VITAMIN B-12: CPT | Performed by: FAMILY MEDICINE

## 2024-10-08 PROCEDURE — 84443 ASSAY THYROID STIM HORMONE: CPT | Performed by: FAMILY MEDICINE

## 2024-10-08 PROCEDURE — 82043 UR ALBUMIN QUANTITATIVE: CPT | Performed by: FAMILY MEDICINE

## 2024-10-08 PROCEDURE — 80053 COMPREHEN METABOLIC PANEL: CPT | Performed by: FAMILY MEDICINE

## 2024-10-08 PROCEDURE — 83036 HEMOGLOBIN GLYCOSYLATED A1C: CPT | Performed by: FAMILY MEDICINE

## 2024-10-08 PROCEDURE — 82570 ASSAY OF URINE CREATININE: CPT | Performed by: FAMILY MEDICINE

## 2024-10-08 PROCEDURE — 80061 LIPID PANEL: CPT | Performed by: FAMILY MEDICINE

## 2024-10-08 ASSESSMENT — PAIN SCALES - GENERAL: PAINLEVEL: EXTREME PAIN (8)

## 2024-10-08 ASSESSMENT — ENCOUNTER SYMPTOMS: HEADACHES: 1

## 2024-10-08 NOTE — PROGRESS NOTES
"  Assessment & Plan     Hypertension goal BP (blood pressure) < 140/80  Blood pressure just above goal but for his age I will hold off on any further intervention especially when I am not 100% sure about his current medication uses.  I recommended him to bring all of his medications next time when he comes in.      Type 2 diabetes mellitus without complication, without long-term current use of insulin (H)  Diet controlled. Doing well. A1c stable.   - Hemoglobin A1c; Future  - Lipid panel reflex to direct LDL Fasting; Future  - Comprehensive metabolic panel; Future  - Albumin Random Urine Quantitative with Creat Ratio; Future  - CBC with platelets; Future  - Vitamin B12; Future  - TSH with free T4 reflex; Future  - Hemoglobin A1c  - Lipid panel reflex to direct LDL Fasting  - Comprehensive metabolic panel  - Albumin Random Urine Quantitative with Creat Ratio  - CBC with platelets  - Vitamin B12  - TSH with free T4 reflex    Chronic nonintractable headache, unspecified headache type  Unclear. Multiple factors may be playing roles. Feels tylenol is controlling his symptoms. Ok to continue for now but asked him to bring his meds for clarification. History of tension headache.     BMI  Estimated body mass index is 28.46 kg/m  as calculated from the following:    Height as of this encounter: 1.588 m (5' 2.5\").    Weight as of this encounter: 71.7 kg (158 lb 1.6 oz).     Follow up in 3 months.     Terri Barry is a 89 year old, presenting for the following health issues:  Headache (Pt stated that he has his headaches for a few days now, but it mostly happens at night. Pt will like it get a blood and urine test. /Pt stated that he was taking medications for his headaches but he lost his medications and he can't seems to get any more of them. /)        10/8/2024     3:14 PM   Additional Questions   Roomed by Arabella Osullivan   Accompanied by Family member     History of Present Illness       Headaches:   Since the patient's " "last clinic visit, headaches are: worsened  The patient is getting headaches:  Mostly at night it comes and goes, it gets better then goes away  He is able to do normal daily activities when he has a migraine.  The patient is taking the following rescue/relief medications:  No rescue/relief medications   Patient states \"I get no relief\" from the rescue/relief medications.   The patient is taking the following medications to prevent migraines:  Other  In the past 4 weeks, the patient has gone to an Urgent Care or Emergency Room 0 times times due to headaches.   He is taking medications regularly.     Phone  used.     Overall feeling fine. Having some headaches.     Headaches makes him feel weak. Feels shaky. Going on for a while. Mostly in the evening. Feels shaky and walk after headache.   Walks around in house, drinks water and feels better. Takes around an hour to feel better.   Not every night. Takes bp medications. Lives alone. Takes his own medications.   Not taking any pain meds except for may be tylenol. Does not think there is a need to have a nurse at home to check medications.         Objective    BP (!) 140/72   Pulse 57   Temp 97.4  F (36.3  C) (Temporal)   Resp 13   Ht 1.588 m (5' 2.5\")   Wt 71.7 kg (158 lb 1.6 oz)   SpO2 95%   BMI 28.46 kg/m    Body mass index is 28.46 kg/m .  Physical Exam             The longitudinal plan of care for the diagnosis(es)/condition(s) as documented were addressed during this visit. Due to the added complexity in care, I will continue to support Asha in the subsequent management and with ongoing continuity of care.  Signed Electronically by: Samuel Lowe MD    "

## 2024-10-08 NOTE — PATIENT INSTRUCTIONS
Bring all of your medications bottles when I see you next time.     1/7/25@ 3:40 pm  Tuesday.       =======================================  FYI:     System will autorelease results as soon as they are available. I usually wait for all results to be ready before sending you a comment or message.  Be assured I will review and comment on all of your results as soon as I can.     I am at [facility]  ([phone]) usually Monday, Tuesday and Wednesday.   Messages, evisits, phone calls received on Thursday and Friday may not get responded very urgently but I will try to respond as soon as possible.     You can schedule a video visit through this link Video Visits ([email or web address])     Sign up for [facility] ([email or web address]).  This will allow you to review your results, securely communicate with a provider, and schedule virtual visits as well.

## 2024-10-09 LAB
ALBUMIN SERPL BCG-MCNC: 4.4 G/DL (ref 3.5–5.2)
ALP SERPL-CCNC: 52 U/L (ref 40–150)
ALT SERPL W P-5'-P-CCNC: 13 U/L (ref 0–70)
ANION GAP SERPL CALCULATED.3IONS-SCNC: 10 MMOL/L (ref 7–15)
AST SERPL W P-5'-P-CCNC: 18 U/L (ref 0–45)
BILIRUB SERPL-MCNC: 0.3 MG/DL
BUN SERPL-MCNC: 13.3 MG/DL (ref 8–23)
CALCIUM SERPL-MCNC: 9.2 MG/DL (ref 8.8–10.4)
CHLORIDE SERPL-SCNC: 102 MMOL/L (ref 98–107)
CHOLEST SERPL-MCNC: 226 MG/DL
CREAT SERPL-MCNC: 1.19 MG/DL (ref 0.67–1.17)
CREAT UR-MCNC: 41.1 MG/DL
EGFRCR SERPLBLD CKD-EPI 2021: 59 ML/MIN/1.73M2
FASTING STATUS PATIENT QL REPORTED: NO
FASTING STATUS PATIENT QL REPORTED: NO
GLUCOSE SERPL-MCNC: 82 MG/DL (ref 70–99)
HCO3 SERPL-SCNC: 28 MMOL/L (ref 22–29)
HDLC SERPL-MCNC: 56 MG/DL
LDLC SERPL CALC-MCNC: 136 MG/DL
MICROALBUMIN UR-MCNC: 27.3 MG/L
MICROALBUMIN/CREAT UR: 66.42 MG/G CR (ref 0–17)
NONHDLC SERPL-MCNC: 170 MG/DL
POTASSIUM SERPL-SCNC: 4.6 MMOL/L (ref 3.4–5.3)
PROT SERPL-MCNC: 7.1 G/DL (ref 6.4–8.3)
SODIUM SERPL-SCNC: 140 MMOL/L (ref 135–145)
TRIGL SERPL-MCNC: 170 MG/DL
TSH SERPL DL<=0.005 MIU/L-ACNC: 0.98 UIU/ML (ref 0.3–4.2)
VIT B12 SERPL-MCNC: 456 PG/ML (ref 232–1245)

## 2024-10-11 ENCOUNTER — TELEPHONE (OUTPATIENT)
Dept: FAMILY MEDICINE | Facility: CLINIC | Age: 89
End: 2024-10-11
Payer: COMMERCIAL

## 2024-10-11 NOTE — TELEPHONE ENCOUNTER
Attempt #1. Need to attempt call back to reach patient.     Called with . First number no answer. Second number Asha answered. Patient was very insistent that their doctor is not at the [facility]. They would not verify  with writer.      Suzie Guillen RN  [facility]

## 2024-10-11 NOTE — TELEPHONE ENCOUNTER
P h  General Call    Contacts       Contact Date/Time Type Contact Phone/Fax    10/11/2024 10:53 AM CDT Phone (Incoming) KATIE Jules (Emergency Contact) [phone] (M)          Reason for Call:  Lab results from 10/08/2024 visit    What are your questions or concerns:    Patient's daughterAnthony Same (CTC on file) requesting for last office visit result note.  Writer relayed Dr. Lowe's message to grand daughter:    Samuel Lowe MD  10/11/2024  8:45 AM CDT       Cholesterol is somewhat getting worse. Please take your cholesterol pill (atrovastatin) if you are not taking it consistently.  Kidney function is somewhat worse than last time. Continue to work on blood pressure control to avoid further worsening of your kidney function.  B12, thyroid, diabetes, hemoglobin, platelets - are normal.     Patient/family is requesting a copy of the current lab results to be sent to patient's address on file.    Date of last appointment with provider: 10/08/2024    Okay to leave a detailed message?: No at Cell number on file:    Telephone Information:   Mobile [phone]     Will route message to [facility] Care Team to help send Lab result letter to patient's address on file.    MALIK EscalonaN, RN, PHN   [facility]

## 2024-10-11 NOTE — TELEPHONE ENCOUNTER
----- Message from Chip DIAZ sent at 10/11/2024 10:01 AM CDT -----    ----- Message -----  From: Samuel Lowe MD  Sent: 10/11/2024   8:45 AM CDT  To: [facility] Pool    Cholesterol is somewhat getting worse. Please take your cholesterol pill (atrovastatin) if you are not taking it consistently.   Kidney function is somewhat worse than last time. Continue to work on blood pressure control to avoid further worsening of your kidney function.   B12, thyroid, diabetes, hemoglobin, platelets - are normal.

## 2024-10-11 NOTE — LETTER
October 11, 2024      Asha Sanford  [address]  [place] [address]        Dear ,    We are writing to inform you of your test results.    Component  Ref Range & Units  3 d ago  (10/8/24)          Estimated Average Glucose  <117 mg/dL 114          Hemoglobin A1C  0.0 - 5.6 % 5.6             Component  Ref Range & Units  3 d ago  (10/8/24)          Cholesterol  <200 mg/dL 226 High           Triglycerides  <150 mg/dL 170 High           Direct Measure HDL  >=40 mg/dL 56          LDL Cholesterol Calculated  <100 mg/dL 136 High           Non HDL Cholesterol  <130 mg/dL 170 High                      Component  Ref Range & Units  3 d ago  (10/8/24)          Sodium  135 - 145 mmol/L 140          Potassium  3.4 - 5.3 mmol/L 4.6          Carbon Dioxide (CO2)  22 - 29 mmol/L 28          Anion Gap  7 - 15 mmol/L 10          Urea Nitrogen  8.0 - 23.0 mg/dL 13.3          Creatinine  0.67 - 1.17 mg/dL 1.19 High           GFR Estimate  >60 mL/min/1.73m2 59 Low              Calcium  8.8 - 10.4 mg/dL 9.2             Chloride  98 - 107 mmol/L 102          Glucose  70 - 99 mg/dL 82          Alkaline Phosphatase  40 - 150 U/L 52          AST  0 - 45 U/L 18          ALT  0 - 70 U/L 13          Protein Total  6.4 - 8.3 g/dL 7.1          Albumin  3.5 - 5.2 g/dL 4.4          Bilirubin Total  <=1.2 mg/dL 0.3          Patient Fasting > 8hrs? No                         Component  Ref Range & Units  3 d ago          Creatinine Urine mg/dL  mg/dL 41.1         Comment: The reference ranges have not been established in urine creatinine. The results should be integrated into the clinical context for interpretation.    Albumin Urine mg/L  mg/L 27.3         Comment: The reference ranges have not been established in urine albumin. The results should be integrated into the clinical context for interpretation.    Albumin Urine mg/g Cr  0.00 - 17.00 mg/g Cr 66.42 High            Component  Ref Range & Units  3 d  ago  (10/8/24)          WBC Count  4.0 - 11.0 10e3/uL 5.6          RBC Count  4.40 - 5.90 10e6/uL 4.53          Hemoglobin  13.3 - 17.7 g/dL 13.5          Hematocrit  40.0 - 53.0 % 42.5          MCV  78 - 100 fL 94          MCH  26.5 - 33.0 pg 29.8          MCHC  31.5 - 36.5 g/dL 31.8          RDW  10.0 - 15.0 % 12.8          Platelet Count  150 - 450 10e3/uL 236                         Component  Ref Range & Units      Vitamin B12  232 - 1,245 pg/mL 456     Component  Ref Range & Units 3 d ago     TSH  0.30 - 4.20 uIU/mL 0.98     If you have any questions or concerns, please call the clinic at the number listed above.       Sincerely,        Your [facility] Care Team

## 2024-11-19 DIAGNOSIS — E55.9 VITAMIN D DEFICIENCY: ICD-10-CM

## 2024-11-19 RX ORDER — CHOLECALCIFEROL (VITAMIN D3) 25 MCG
2 TABLET ORAL DAILY
Qty: 180 TABLET | Refills: 2 | Status: SHIPPED | OUTPATIENT
Start: 2024-11-19

## 2024-11-20 ENCOUNTER — TRANSFERRED RECORDS (OUTPATIENT)
Dept: MULTI SPECIALTY CLINIC | Facility: CLINIC | Age: 89
End: 2024-11-20

## 2024-11-20 LAB — RETINOPATHY: NORMAL

## 2024-11-26 DIAGNOSIS — E78.5 HYPERLIPIDEMIA LDL GOAL <100: Chronic | ICD-10-CM

## 2024-11-26 RX ORDER — ATORVASTATIN CALCIUM 20 MG/1
20 TABLET, FILM COATED ORAL DAILY
Qty: 90 TABLET | Refills: 2 | Status: SHIPPED | OUTPATIENT
Start: 2024-11-26

## 2024-12-16 DIAGNOSIS — I10 HYPERTENSION GOAL BP (BLOOD PRESSURE) < 140/80: ICD-10-CM

## 2024-12-16 DIAGNOSIS — R39.11 HESITANCY OF MICTURITION: ICD-10-CM

## 2024-12-16 RX ORDER — METOPROLOL SUCCINATE 50 MG/1
50 TABLET, EXTENDED RELEASE ORAL DAILY
Qty: 90 TABLET | Refills: 0 | Status: SHIPPED | OUTPATIENT
Start: 2024-12-16

## 2024-12-16 RX ORDER — FINASTERIDE 5 MG/1
5 TABLET, FILM COATED ORAL DAILY
Qty: 30 TABLET | Refills: 0 | Status: SHIPPED | OUTPATIENT
Start: 2024-12-16

## 2024-12-18 ENCOUNTER — PATIENT OUTREACH (OUTPATIENT)
Dept: CARE COORDINATION | Facility: CLINIC | Age: 89
End: 2024-12-18
Payer: COMMERCIAL

## 2025-01-07 ENCOUNTER — OFFICE VISIT (OUTPATIENT)
Dept: FAMILY MEDICINE | Facility: CLINIC | Age: 89
End: 2025-01-07
Payer: COMMERCIAL

## 2025-01-07 ENCOUNTER — VIRTUAL VISIT (OUTPATIENT)
Dept: INTERPRETER SERVICES | Facility: CLINIC | Age: 89
End: 2025-01-07

## 2025-01-07 VITALS
WEIGHT: 161.7 LBS | DIASTOLIC BLOOD PRESSURE: 67 MMHG | RESPIRATION RATE: 18 BRPM | HEART RATE: 58 BPM | TEMPERATURE: 97.7 F | HEIGHT: 62 IN | BODY MASS INDEX: 29.76 KG/M2 | OXYGEN SATURATION: 99 % | SYSTOLIC BLOOD PRESSURE: 154 MMHG

## 2025-01-07 DIAGNOSIS — E11.9 TYPE 2 DIABETES MELLITUS WITHOUT COMPLICATION, WITHOUT LONG-TERM CURRENT USE OF INSULIN (H): Primary | Chronic | ICD-10-CM

## 2025-01-07 DIAGNOSIS — I10 HYPERTENSION GOAL BP (BLOOD PRESSURE) < 140/80: Chronic | ICD-10-CM

## 2025-01-07 PROCEDURE — 99214 OFFICE O/P EST MOD 30 MIN: CPT | Performed by: FAMILY MEDICINE

## 2025-01-07 PROCEDURE — G2211 COMPLEX E/M VISIT ADD ON: HCPCS | Performed by: FAMILY MEDICINE

## 2025-01-07 RX ORDER — HYDROCHLOROTHIAZIDE 12.5 MG/1
12.5 TABLET ORAL DAILY
Qty: 90 TABLET | Refills: 1 | Status: SHIPPED | OUTPATIENT
Start: 2025-01-07

## 2025-01-07 ASSESSMENT — PAIN SCALES - GENERAL: PAINLEVEL_OUTOF10: NO PAIN (0)

## 2025-01-07 NOTE — PROGRESS NOTES
Assessment & Plan     Type 2 diabetes mellitus without complication, without long-term current use of insulin (H)  Diet controlled.  A1c is fairly stable.      Hypertension goal BP (blood pressure) < 140/80  Previously tried lisinopril but was feeling shaky and did not like it.  We will try low-dose of hydrochlorothiazide.  For his age would be okay with somewhat liberal blood pressure control  - hydroCHLOROthiazide 12.5 MG tablet; Take 1 tablet (12.5 mg) by mouth daily.        Blood sugar testing frequency justification:  Patient modifying lifestyle changes (diet, exercise) with blood sugars    Follow-up in 3-6  months or so for diabetes.    Subjective   Asha is a 89 year old, presenting for the following health issues:  Diabetes and Recheck Medication (Requesting medication for frequent urination and nitroglycerin. )      1/7/2025     3:22 PM   Additional Questions   Roomed by Vanessa MCKENNA   Accompanied by family     History of Present Illness       Diabetes:   He presents for follow up of diabetes.  He is checking home blood glucose four or more times daily.   He checks blood glucose before and after meals and at bedtime.  Blood glucose is sometimes over 200 and never under 70.  When his blood glucose is low, the patient is asymptomatic for confusion, blurred vision, lethargy and reports not feeling dizzy, shaky, or weak.   He has no concerns regarding his diabetes at this time.  He is having numbness in feet, burning in feet, excessive thirst, blurry vision and weight gain.              Phone  used.     Doing well but getting old as per patient. No major change in health. Using medications as prescribed. Sometime feels dizzy. Checking bp at home. 170-190 systolic. Sometime 150.   Taking metoprolol for bp.   Here with all of his meds bottles.   Previously lisinopril was dcd as he was feeling shaky.             Objective    BP (!) 157/79   Pulse 58   Temp 97.7  F (36.5  C) (Temporal)   Resp 18   Ht  "1.575 m (5' 2\")   Wt 73.3 kg (161 lb 11.2 oz)   SpO2 99%   BMI 29.58 kg/m    Body mass index is 29.58 kg/m .  Physical Exam             The longitudinal plan of care for the diagnosis(es)/condition(s) as documented were addressed during this visit. Due to the added complexity in care, I will continue to support Asha in the subsequent management and with ongoing continuity of care.  Signed Electronically by: Samuel Lowe MD, MD    "

## 2025-01-13 ENCOUNTER — NURSE TRIAGE (OUTPATIENT)
Dept: FAMILY MEDICINE | Facility: CLINIC | Age: 89
End: 2025-01-13
Payer: COMMERCIAL

## 2025-01-13 NOTE — TELEPHONE ENCOUNTER
GO TO OFFICE OR VIDEO VISIT NOW:   * You need to be examined or have a video telemedicine visit.  * PCP OFFICE VISIT: Come into the office right now.  * TRIAGER OPTION - MAKE VIDEO VISIT APPOINTMENT: I am going to set up a video telemedicine visit for you right now.  * No appointments available. Advised UC. Patient would like to wait until tomorrow. Scheduled appointment on 1/14/25 at 1330    Reason for Disposition   Lightheadedness (dizziness) present now, after 2 hours of rest and fluids    Additional Information   Negative: SEVERE difficulty breathing (e.g., struggling for each breath, speaks in single words)   Negative: Shock suspected (e.g., cold/pale/clammy skin, too weak to stand, low BP, rapid pulse)   Negative: Difficult to awaken or acting confused (e.g., disoriented, slurred speech)   Negative: Fainted, and still feels dizzy afterwards   Negative: Overdose (accidental or intentional) of medications   Negative: New neurologic deficit that is present now: * Weakness of the face, arm, or leg on one side of the body * Numbness of the face, arm, or leg on one side of the body * Loss of speech or garbled speech   Negative: Heart beating < 50 beats per minute OR > 140 beats per minute   Negative: Sounds like a life-threatening emergency to the triager   Negative: Chest pain   Negative: Rectal bleeding, bloody stool, or tarry-black stool   Negative: Vomiting is main symptom   Negative: Diarrhea is main symptom   Negative: Headache is main symptom   Negative: Heat exhaustion suspected (i.e., dehydration from heat exposure)   Negative: Patient states that they are having an anxiety or panic attack   Negative: Dizziness from low blood sugar (i.e., < 60 mg/dl or 3.5 mmol/l)   Negative: SEVERE dizziness (e.g., unable to stand, requires support to walk, feels like passing out now)   Negative: SEVERE headache or neck pain   Negative: Spinning or tilting sensation (vertigo) present now and one or more stroke risk factors  "(i.e., hypertension, diabetes mellitus, prior stroke/TIA, heart attack, age over 60) (Exception: Prior physician evaluation for this AND no different/worse than usual.)   Negative: Neurologic deficit that was brief (now gone), ANY of the following:* Weakness of the face, arm, or leg on one side of the body* Numbness of the face, arm, or leg on one side of the body* Loss of speech or garbled speech   Negative: Loss of vision or double vision  (Exception: Similar to previous migraines.)   Negative: Extra heartbeats, irregular heart beating, or heart is beating very fast (i.e., 'palpitations')   Negative: Difficulty breathing   Negative: Drinking very little and dehydration suspected (e.g., no urine > 12 hours, very dry mouth, very lightheaded)   Negative: Follows bleeding (e.g., stomach, rectum, vagina)  (Exception: Became dizzy from sight of small amount blood.)   Negative: Patient sounds very sick or weak to the triager    Answer Assessment - Initial Assessment Questions  1. DESCRIPTION: \"Describe your dizziness.\"      I couldn't look up and down  2. LIGHTHEADED: \"Do you feel lightheaded?\" (e.g., somewhat faint, woozy, weak upon standing)      Light headed   3. VERTIGO: \"Do you feel like either you or the room is spinning or tilting?\" (i.e. vertigo)      Yes, spinning  4. SEVERITY: \"How bad is it?\"  \"Do you feel like you are going to faint?\" \"Can you stand and walk?\"    Mild to moderate   5. ONSET:  \"When did the dizziness begin?\"      1/12/25  6. AGGRAVATING FACTORS: \"Does anything make it worse?\" (e.g., standing, change in head position)      Standing or sitting down  7. HEART RATE: \"Can you tell me your heart rate?\" \"How many beats in 15 seconds?\"  (Note: not all patients can do this)        N/A  8. CAUSE: \"What do you think is causing the dizziness?\"      I think I'm getting sick  9. RECURRENT SYMPTOM: \"Have you had dizziness before?\" If Yes, ask: \"When was the last time?\" \"What happened that time?\"      No  10. " "OTHER SYMPTOMS: \"Do you have any other symptoms?\" (e.g., fever, chest pain, vomiting, diarrhea, bleeding)        Fever  11. PREGNANCY: \"Is there any chance you are pregnant?\" \"When was your last menstrual period?\"        N/A    Protocols used: Dizziness-A-OH    "

## 2025-01-14 ENCOUNTER — OFFICE VISIT (OUTPATIENT)
Dept: FAMILY MEDICINE | Facility: CLINIC | Age: 89
End: 2025-01-14
Payer: COMMERCIAL

## 2025-01-14 VITALS
DIASTOLIC BLOOD PRESSURE: 76 MMHG | SYSTOLIC BLOOD PRESSURE: 106 MMHG | RESPIRATION RATE: 16 BRPM | OXYGEN SATURATION: 97 % | BODY MASS INDEX: 28.88 KG/M2 | TEMPERATURE: 97.2 F | HEART RATE: 67 BPM | HEIGHT: 63 IN | WEIGHT: 163 LBS

## 2025-01-14 DIAGNOSIS — I10 HYPERTENSION GOAL BP (BLOOD PRESSURE) < 140/80: Chronic | ICD-10-CM

## 2025-01-14 DIAGNOSIS — R42 DIZZINESS: Primary | ICD-10-CM

## 2025-01-14 PROCEDURE — G2211 COMPLEX E/M VISIT ADD ON: HCPCS | Performed by: PHYSICIAN ASSISTANT

## 2025-01-14 PROCEDURE — 99213 OFFICE O/P EST LOW 20 MIN: CPT | Performed by: PHYSICIAN ASSISTANT

## 2025-01-14 RX ORDER — MECLIZINE HCL 12.5 MG 12.5 MG/1
12.5 TABLET ORAL 3 TIMES DAILY PRN
Qty: 30 TABLET | Refills: 1 | Status: SHIPPED | OUTPATIENT
Start: 2025-01-14

## 2025-01-14 ASSESSMENT — ENCOUNTER SYMPTOMS: DIZZINESS: 1

## 2025-01-14 NOTE — PROGRESS NOTES
Assessment & Plan     Dizziness   Hypertension goal BP < 140/90 - suspicious for BPPV vs meniere's disease given that dizziness started prior to starting new hydrochlorothiazide. He BP is much lower than previous with addition of this - question whether relative hypotension is contributing to symptoms. Will start with exercises (see AVS) and PRN meclizine. If no improvement in the next 2-3 weeks, he will reach out and I would recommend d/c of hydrochlorothiazide.   - meclizine (ANTIVERT) 12.5 MG tablet; Take 1 tablet (12.5 mg) by mouth 3 times daily as needed for dizziness.    The longitudinal plan of care for the diagnosis(es)/condition(s) as documented were addressed during this visit. Due to the added complexity in care, I will continue to support Asha in the subsequent management and with ongoing continuity of care.      Subjective   Asha is a 89 year old, presenting for the following health issues:  Dizziness      1/14/2025     1:34 PM   Additional Questions   Roomed by Marilia Tineosi here today with complaints of dizziness    Symptoms ongoing for the past 2 days  Started on hydrochlorothiazide 12.5mg yesterday - dizziness started day before starting hydrochlorothiazide  Dizziness described as room spinning, lasts for few seconds to minutes before resolving  Notices when lying in the bed and rolling over, also with head position changes (putting head down to pray)  Has never had symptoms like this before  No associated headache, hearing changes, vision changes, weakness, expressive/receptive aphasia, URI symptoms    Dizziness    History of Present Illness       Diabetes:   He presents for follow up of diabetes.  He is checking home blood glucose four or more times daily.   He checks blood glucose before and after meals and at bedtime.  Blood glucose is sometimes over 200 and never under 70.  When his blood glucose is low, the patient is asymptomatic for confusion, blurred vision, lethargy and  "reports not feeling dizzy, shaky, or weak.   He has no concerns regarding his diabetes at this time.  He is having numbness in feet, burning in feet, excessive thirst, blurry vision and weight gain.                  Objective    /76   Pulse 67   Temp 97.2  F (36.2  C) (Temporal)   Resp 16   Ht 1.588 m (5' 2.5\")   Wt 73.9 kg (163 lb)   SpO2 97%   BMI 29.34 kg/m    Body mass index is 29.34 kg/m .  Physical Exam   GENERAL: alert and no distress  EYES: Eyes grossly normal to inspection, PERRL and conjunctivae and sclerae normal  HENT: normal cephalic/atraumatic and ear canals and TM's normal  NECK: no adenopathy, no asymmetry, masses, or scars  PSYCH: mentation appears normal, affect normal/bright        Signed Electronically by: Vicky العراقي PA-C    "

## 2025-01-16 ENCOUNTER — PATIENT OUTREACH (OUTPATIENT)
Dept: GERIATRIC MEDICINE | Facility: CLINIC | Age: 89
End: 2025-01-16

## 2025-01-16 ASSESSMENT — PATIENT HEALTH QUESTIONNAIRE - PHQ9: SUM OF ALL RESPONSES TO PHQ QUESTIONS 1-9: 6

## 2025-01-16 ASSESSMENT — ACTIVITIES OF DAILY LIVING (ADL): DEPENDENT_IADLS:: CLEANING;COOKING;LAUNDRY;SHOPPING;MEAL PREPARATION;MEDICATION MANAGEMENT;TRANSPORTATION

## 2025-01-16 NOTE — PROGRESS NOTES
[facility] Care Coordination Contact    [facility] Home Visit Assessment     Home visit for Health Risk Assessment with Asha Sanford completed on January 16th, 2025    Type of residence:: Apartment  Current living arrangement:: I live alone     Assessment completed with:: Patient, Children (Daughter, Priscila)    Current Care Plan  Member currently receiving the following home care services:     Member currently receiving the following community resources: Day Care, PCA, Housekeeping/Chore Agency, Transportation Services (Homemaking)      Medication Review  Medication reconciliation completed in Epic: Yes  Medication set-up & administration: Family/informal caregiver sets up daily.  Family caregiver administers medications.  Medication Risk Assessment Medication (1 or more, place referral to MTM): Taking 1 or more high-risk medications for adults >65 years  MTM Referral Placed: No: reviewed benefit only    Mental/Behavioral Health   Depression Screening:      PHQ-9 Total Score: 6    Mental health DX:: Yes (Insomnia)        ADL/IADL Dependencies:   Dependent ADLs:: Ambulation-walker, Bathing, Dressing, Grooming, Transfers  Dependent IADLs:: Cleaning, Cooking, Laundry, Shopping, Meal Preparation, Medication Management, Transportation    [facility]: Shared information regarding [employer]. Reviewed preventative health screening and health plan supplemental benefits/incentives. Reviewed medication disposal form and transition of care member handout.    CFSS Assessment completed at visit: Yes Annual CFSS assessment indicated 3.5 hours per day of CFSS. This is the same as the previous assessment.     Elderly Waiver Eligibility: Yes-will continue on EW    Care Plan & Recommendations: Member to continue current support plan including adult day care, homemaking, monthly supply of incontinent products and PCA/CFSS services.    See [place] for detailed  assessment information.    Follow-Up Plan: Member informed of future contact, plan to f/u with member with a 6 month telephone assessment.  Contact information shared with member and family, encouraged member to call with any questions or concerns at any time.    [place] care continuum providers: Please see Snapshot and Care Management Flowsheets for Specific details of care plan.    This CC note routed to PCP, Samuel Lowe.    Daphne Ochoa RN BSN PHN      [facility]  Care Coordinator  Phone:   [phone]  Fax:       [phone]

## 2025-01-29 ENCOUNTER — PATIENT OUTREACH (OUTPATIENT)
Dept: GERIATRIC MEDICINE | Facility: CLINIC | Age: 89
End: 2025-01-29
Payer: COMMERCIAL

## 2025-01-29 NOTE — PROGRESS NOTES
[facility] Care Coordination Contact    [facility]:  Emailed required CFSS documents to [facility].  Mailed member supplemental summary chart and assessment summary letter.     Jennifer Ennis  Care Management Specialist  [facility]  [phone]

## 2025-02-09 ENCOUNTER — PATIENT OUTREACH (OUTPATIENT)
Dept: GERIATRIC MEDICINE | Facility: CLINIC | Age: 89
End: 2025-02-09
Payer: COMMERCIAL

## 2025-02-09 NOTE — LETTER
February 9, 2025       ALFREDO ALEMAN  [address]  [address]  [facility] [address]      Dear Alfredo,    At [facility], we re dedicated to improving your health and wellness. Enclosed is the Support Plan developed with you on 01/16/2025. Please review the Support Plan carefully.    As a reminder, during your visit we talked about:   Ways to manage your physical and mental health   Using health care to maintain and improve your health    Your preventive care needs      Remember to contact your care coordinator if you:   Are hospitalized or plan to be hospitalized    Have a fall     Have a change in your physical or mental health   Need help finding support or services    If you have questions or don t agree with your Support Plan, call me at [phone]. You can also call me if your needs change. TTY users call the [place] Relay at 429 or 1-894.557.6626 (zonjzv-gj-bvslma relay service).    Sincerely,       CRISTY Middleton, RN, PHN    E-mail: [email or web address]  Phone: [phone]      [employer]                [id number] accepted     (06/2024)                [address], [place], [place] [id number] [phone]  fax [phone]  [facility]

## 2025-02-09 NOTE — TELEPHONE ENCOUNTER
[facility] Care Coordination Contact    Received after visit chart from care coordinator.  Completed following tasks: Mailed copy of support plan to member, Mailed [place] Choices signature sheet pages 3-4, Mailed Safe Medication Disposal , Mailed Transition of Care Member Handout, Submitted referrals/auths for ADC with transportation and homemaking, and Updated services in Database.   and Provider Signature - No Support Plan Shared:  Member indicates that they do not want their support plan shared with any EW providers.    Katherine Mitchell  Care Management Specialist  [facility]  [phone]

## 2025-04-29 ENCOUNTER — PATIENT OUTREACH (OUTPATIENT)
Dept: GERIATRIC MEDICINE | Facility: CLINIC | Age: 89
End: 2025-04-29
Payer: COMMERCIAL

## 2025-04-29 NOTE — PROGRESS NOTES
[facility] Care Coordination Contact    CHW spoke with member's daughter Josh at [phone]  to share that member's upcoming appointment on 07/09 with his PCP has been changed to be a Wellness Exam appointment. Josh requested for CHW to call Priscila Acosta at [phone] and share with her the changes since she is the person who is going to take member to the appointment. CHW spoke with  Priscila and appointment info was provided. Contact info provided as well.     ELVER Michael  [facility]  [phone]

## 2025-06-26 ENCOUNTER — PATIENT OUTREACH (OUTPATIENT)
Dept: GERIATRIC MEDICINE | Facility: CLINIC | Age: 89
End: 2025-06-26
Payer: COMMERCIAL

## 2025-06-26 NOTE — PROGRESS NOTES
[facility] Care Coordination Contact    Completed following tasks: Sent copy of service delivery plan and addendum to member and [employer] (include FMS agency, CFSS agency as applicable). Sent copy of revised support plan to member. Submitted auth to health plan. Updated database.    Jennifer Ennis  Care Management Specialist  [facility]  [phone]

## 2025-07-09 ENCOUNTER — VIRTUAL VISIT (OUTPATIENT)
Dept: INTERPRETER SERVICES | Facility: CLINIC | Age: 89
End: 2025-07-09

## 2025-07-09 ENCOUNTER — OFFICE VISIT (OUTPATIENT)
Dept: FAMILY MEDICINE | Facility: CLINIC | Age: 89
End: 2025-07-09
Payer: COMMERCIAL

## 2025-07-09 VITALS
OXYGEN SATURATION: 97 % | SYSTOLIC BLOOD PRESSURE: 110 MMHG | DIASTOLIC BLOOD PRESSURE: 60 MMHG | HEIGHT: 63 IN | RESPIRATION RATE: 18 BRPM | HEART RATE: 60 BPM | BODY MASS INDEX: 27.68 KG/M2 | WEIGHT: 156.2 LBS | TEMPERATURE: 97.2 F

## 2025-07-09 DIAGNOSIS — K21.9 GASTROESOPHAGEAL REFLUX DISEASE WITHOUT ESOPHAGITIS: Chronic | ICD-10-CM

## 2025-07-09 DIAGNOSIS — I10 HYPERTENSION GOAL BP (BLOOD PRESSURE) < 140/80: ICD-10-CM

## 2025-07-09 DIAGNOSIS — R39.11 HESITANCY OF MICTURITION: ICD-10-CM

## 2025-07-09 DIAGNOSIS — E78.5 HYPERLIPIDEMIA LDL GOAL <100: Chronic | ICD-10-CM

## 2025-07-09 DIAGNOSIS — E11.9 TYPE 2 DIABETES MELLITUS WITHOUT COMPLICATION, WITHOUT LONG-TERM CURRENT USE OF INSULIN (H): Primary | ICD-10-CM

## 2025-07-09 DIAGNOSIS — Z00.00 ENCOUNTER FOR MEDICARE ANNUAL WELLNESS EXAM: ICD-10-CM

## 2025-07-09 LAB
ALBUMIN SERPL BCG-MCNC: 4.1 G/DL (ref 3.5–5.2)
ALP SERPL-CCNC: 56 U/L (ref 40–150)
ALT SERPL W P-5'-P-CCNC: 10 U/L (ref 0–70)
ANION GAP SERPL CALCULATED.3IONS-SCNC: 12 MMOL/L (ref 7–15)
AST SERPL W P-5'-P-CCNC: 20 U/L (ref 0–45)
BILIRUB SERPL-MCNC: 0.3 MG/DL
BUN SERPL-MCNC: 21.4 MG/DL (ref 8–23)
CALCIUM SERPL-MCNC: 10 MG/DL (ref 8.8–10.4)
CHLORIDE SERPL-SCNC: 98 MMOL/L (ref 98–107)
CHOLEST SERPL-MCNC: 224 MG/DL
CREAT SERPL-MCNC: 1.53 MG/DL (ref 0.67–1.17)
CREAT UR-MCNC: 71 MG/DL
EGFRCR SERPLBLD CKD-EPI 2021: 43 ML/MIN/1.73M2
ERYTHROCYTE [DISTWIDTH] IN BLOOD BY AUTOMATED COUNT: 12.8 % (ref 10–15)
EST. AVERAGE GLUCOSE BLD GHB EST-MCNC: 111 MG/DL
FASTING STATUS PATIENT QL REPORTED: YES
FASTING STATUS PATIENT QL REPORTED: YES
GLUCOSE SERPL-MCNC: 105 MG/DL (ref 70–99)
HBA1C MFR BLD: 5.5 % (ref 0–5.6)
HCO3 SERPL-SCNC: 27 MMOL/L (ref 22–29)
HCT VFR BLD AUTO: 38.4 % (ref 40–53)
HDLC SERPL-MCNC: 57 MG/DL
HGB BLD-MCNC: 12.6 G/DL (ref 13.3–17.7)
LDLC SERPL CALC-MCNC: 125 MG/DL
MCH RBC QN AUTO: 30.2 PG (ref 26.5–33)
MCHC RBC AUTO-ENTMCNC: 32.8 G/DL (ref 31.5–36.5)
MCV RBC AUTO: 92 FL (ref 78–100)
MICROALBUMIN UR-MCNC: <12 MG/L
MICROALBUMIN/CREAT UR: NORMAL MG/G{CREAT}
NONHDLC SERPL-MCNC: 167 MG/DL
PLATELET # BLD AUTO: 210 10E3/UL (ref 150–450)
POTASSIUM SERPL-SCNC: 3.6 MMOL/L (ref 3.4–5.3)
PROT SERPL-MCNC: 6.9 G/DL (ref 6.4–8.3)
RBC # BLD AUTO: 4.17 10E6/UL (ref 4.4–5.9)
SODIUM SERPL-SCNC: 137 MMOL/L (ref 135–145)
TRIGL SERPL-MCNC: 208 MG/DL
TSH SERPL DL<=0.005 MIU/L-ACNC: 1.26 UIU/ML (ref 0.3–4.2)
VIT B12 SERPL-MCNC: 377 PG/ML (ref 232–1245)
WBC # BLD AUTO: 5.4 10E3/UL (ref 4–11)

## 2025-07-09 PROCEDURE — 80053 COMPREHEN METABOLIC PANEL: CPT | Performed by: FAMILY MEDICINE

## 2025-07-09 PROCEDURE — 85027 COMPLETE CBC AUTOMATED: CPT | Performed by: FAMILY MEDICINE

## 2025-07-09 PROCEDURE — 82043 UR ALBUMIN QUANTITATIVE: CPT | Performed by: FAMILY MEDICINE

## 2025-07-09 PROCEDURE — T1013 SIGN LANG/ORAL INTERPRETER: HCPCS | Mod: U4

## 2025-07-09 PROCEDURE — 36415 COLL VENOUS BLD VENIPUNCTURE: CPT | Performed by: FAMILY MEDICINE

## 2025-07-09 PROCEDURE — 80061 LIPID PANEL: CPT | Performed by: FAMILY MEDICINE

## 2025-07-09 PROCEDURE — 83036 HEMOGLOBIN GLYCOSYLATED A1C: CPT | Performed by: FAMILY MEDICINE

## 2025-07-09 PROCEDURE — 82570 ASSAY OF URINE CREATININE: CPT | Performed by: FAMILY MEDICINE

## 2025-07-09 PROCEDURE — 82607 VITAMIN B-12: CPT | Performed by: FAMILY MEDICINE

## 2025-07-09 PROCEDURE — 84443 ASSAY THYROID STIM HORMONE: CPT | Performed by: FAMILY MEDICINE

## 2025-07-09 RX ORDER — OMEPRAZOLE 20 MG/1
CAPSULE, DELAYED RELEASE ORAL
Qty: 90 CAPSULE | Refills: 3 | Status: SHIPPED | OUTPATIENT
Start: 2025-07-09

## 2025-07-09 RX ORDER — ATORVASTATIN CALCIUM 20 MG/1
20 TABLET, FILM COATED ORAL DAILY
Qty: 90 TABLET | Refills: 3 | Status: SHIPPED | OUTPATIENT
Start: 2025-07-09

## 2025-07-09 RX ORDER — FINASTERIDE 5 MG/1
5 TABLET, FILM COATED ORAL DAILY
Qty: 90 TABLET | Refills: 3 | Status: SHIPPED | OUTPATIENT
Start: 2025-07-09

## 2025-07-09 RX ORDER — HYDROCHLOROTHIAZIDE 12.5 MG/1
12.5 TABLET ORAL DAILY
Qty: 90 TABLET | Refills: 3 | Status: SHIPPED | OUTPATIENT
Start: 2025-07-09

## 2025-07-09 RX ORDER — METOPROLOL SUCCINATE 50 MG/1
50 TABLET, EXTENDED RELEASE ORAL DAILY
Qty: 90 TABLET | Refills: 3 | Status: SHIPPED | OUTPATIENT
Start: 2025-07-09

## 2025-07-09 SDOH — HEALTH STABILITY: PHYSICAL HEALTH: ON AVERAGE, HOW MANY DAYS PER WEEK DO YOU ENGAGE IN MODERATE TO STRENUOUS EXERCISE (LIKE A BRISK WALK)?: 3 DAYS

## 2025-07-09 SDOH — HEALTH STABILITY: PHYSICAL HEALTH: ON AVERAGE, HOW MANY MINUTES DO YOU ENGAGE IN EXERCISE AT THIS LEVEL?: 30 MIN

## 2025-07-09 ASSESSMENT — PAIN SCALES - GENERAL: PAINLEVEL_OUTOF10: SEVERE PAIN (8)

## 2025-07-09 ASSESSMENT — SOCIAL DETERMINANTS OF HEALTH (SDOH): HOW OFTEN DO YOU GET TOGETHER WITH FRIENDS OR RELATIVES?: ONCE A WEEK

## 2025-07-09 NOTE — PATIENT INSTRUCTIONS
"Patient Education   Talada Daryeelka Ka   Hortaga ah  Michael waa talo guud oo aan inta badan bixino si aan dadka uga caawino inay caafimaad qabaan. Kooxdaada daryeelka ayaa laga yaabaa inay kuu hayaan talo kuu gaar ah. Fadlan noemi hadal kooxdaada daryeelka baahiyahaaga daryeelka ee ka hortaga.  Hab Nololeedka  Samee jimicsi ugu yaraan 150 daqiiqo todobaad kasta (30 daqiiqo maalintii, 5 maalmood todobaadkii).  Samee hawlaha xoojiya murqaha 2 maalmood todobaadkii. Kuwani waxay kaa caawinayaan xakamaynta miisaankaaga iyo ka hortaga cudurada.  Lama ogola sigaar cabista  Xiro muraayadaha qorraxda celiya si aad uga hortagto kansarka maqaarka.  Gurigaaga ha laga lisseth gaaska radon 2 ilaa 5-tii sanaba mar. Radon waa gaas aan midab lahayn, oo aan ur lahayn oo wax yeeli jeovany sambabadaada. Si aad wax badan uga ogaato, aad [email or web address].health.[facility].mn. oo raadi \"Radon in Homes.\"  Hay qoryaha iyagoo aan rasaas ku jirin oo ku xir goob badqab leh sida khasnadda badqab leh ama isticmaal khaanada qoryaha, oo qari furayaasha. Markasta si gooni ah ugu quful khaanad rasaasta. Si aad wax badan uga ogaato, booqo dps.mn.gov oo raadi \"safe gun storage.\"  Nafaqada  Cun 5 cunto ama ka badan oo khudaar iyo samira ah maalin kasta.  Isku day rootiga qamadiga ka samaysan, bariiska buniga ah iyo baastada (badalkii rootiga cad, bariiska, iyo baasto).  Hel calcium iyo vitamin D kugu filan. Hubi calaamadda cuntooyinka oo ujeedo 100% ee RDA (kaalmada maalinlaha ah ee lagu talbushrayay).  Baaritaano joogta ah  Samee baaritaanka ilkaha iyo nadiifinta 6 bilood kasta.  Arag kooxdaada daryeelka caafimaadka sanad kasta si aad ugala hadasho:  Isbadal kasta oo ku yimaadda caafimaadkaaga.  Daawooyin kasta oo kooxdaada daryeelka kuu qoreen.  Daryeelka ka hortagga, qorshaynta dhalmada qoyska, iyo siyaabaha looga hortago cudurada daba dheeraada.  Talaabob (talaamichael)   Lilia HPV (ilaa da'da 26), haddii aadan waligaa hore u qaadanin.  Lilia drake B (ilaa da'da " 59),haddi aanad hore u qaadanin.  svetlana COVID-19: Qaado svetlana marka ay dhamaato.  Svetlana kempbpedro: qaado edenilsonjosettepedro abhinav sannad kasta.  Svetlana Cook: Qaado 10-ki sanaba mar.  Emersonaasuyapa shahukiitada, cagaarshawa A, iyo Tallaalka RSV: Weydii kooxdaada daryeelka haddii aad u baahan tahay kuwaas oo ku salaysan khatarta aad ku jirto.  Svetlana gonzalez (loogu talagaly da'da 52 iyo ka weyn).  Baaritaanada [facility]  Baaritaanka sorowga:  Laga bilaabo da'da 35, Iska baar sokorowga ugu yaraan 3 sanaba mar.  Haddii aad ka antonio tahay da'da 35, waydii kooxdaada daryeelka haddii ay tahay in [facility].  Baaritaanka Kolestoroolka: Da'da 39, bilow inaad iska baTecumseho kolestaroolka 5 sanaba mar, ama marar badan haddii lagu taliyey.  Baaritaanka Cufnaanta Lafta (DEXA): Da'da 52,Waydii kooxdaada daryeelka haddii ay tahay in [facility] baaritaanka jilicnaanta lafaha).  Jason C: Iska baar ugu yaraan gaetano mar noloshaada.  Baaritaanka god ku yaala Xididka Dhiiga ka Qaada Wadnaha: Noemi hadal dhakhtarkaaga baaritaankan haddii aad:  Weligaa Sigaar ma cabtay; oo  Aadiga ma lab tahay; oo  da'da u dhaxayso 67 iyo 77.  Cudurada galmada lagu noemi qaado (STIs)  Kahor da'da 24:  Weydii kooxdaada daryeelka haddii ay tahay in [facility] baaro Cudurada galmada [facility] noemi qaado (STIs).  Kadib da'da 24: Iska baar Cudurada galmada lagu noemi qaado (STIs) haddii aad halis ugu jirto. Aad halis ugu jirto [facility] noemi qaado (STIs) (oo ay ku jiraan HIV) haddii:  Hadii aad galmo la samaysay gaetano qof ka badan.  Aadan isticmaalin cinjirka galmada wakhti kasta.  Adiga ama lamaanahaaga laga helay cur [facility] noemi qaado [facility].  Hadii aad halis ugu jirto HIV, wax ka waydii daawada PrEP si aad uga hortagto HIV.  Iska baar HIV ugu yaraan gaetano mar noloshaada, haddii aad halis ugu jirto HIV iyo haddii kale.  Baaritaanada Kansarka  Baaritaanada Kansarka ee Xubinta Taranka [facility]: Haddii aad dumar tahaybrady  si joogto ah u hesho baaritaanka [facility] qeybta hore ee ilmo xubinta taranka da'da 21. Inta badan dadka sameeya baaritaanada caadiga ah ee leh natiijooyinka caadiga ah waxay joogsan karaan da'da 67 kadib. Midan noemi hadal [facility].  Baaritaanka [facility] naasaha (baaritaanka sawiritaanka ee [facility]): Haddii aad naaso leedahay, bilaw inaad ka baarto naasahaaga [facility] naasaha si joogto ahda'da 40. Kani waa baaritaan raajo oo lagu baaro [facility].  Baaritaanka [facility] xiid-maha waaweyn: Waa muhiim in la bilaabo ba[facility] Hospitalank[facility] waaweyn da'da 45.  Samee baaritaanka [facility] malawadka 10-ki sanaba mar (ama in ka badan haddii aad halis ugu jirto) Spring, weydii bixiyahaaga baaritaanada saxarada sida imtixaanka FIT sanad walba ama baaritaanka Cologuard 3-dii sanaba mar.  Si aad wax badan uga barato ikhtiyamendoza winterso: [email or web address].[email or web address]/337709bm.pdf.  Lucianowimaada go'aan mendoza granto: bit.ly/ap28465.  Baaritaanka [facility] [place] kaadi mareenka raga: Hadii aad ugo tahay aad jirto da'da 55 ilaa 69, ka codso daryeel bixiyahaaga haddii aad ka faa'iidaysan lahayd baaritaanka [facility] kaadi mareenka ee sannadkiiba mar.  Baaritaanka [facility] Sambabada: Hadii aad hada sigaar cabto ama aad horaan u cabaysay oo aad jirto da'da 50 ilaa 80, ka codso kooxdaada daryeelka haddii baaritaanka [facility] Hospital[facility]ada socda uu kugu habboon yahay.    Ujeeddooyin wargelin oo kaliya. Ma aha inay beddel u noqoto talada [facility]. Xuquuqda daabacaadda   2023 [facility]. [facility] Hospitaldrea xuquuqdu [facility] Hospitalaundrea shepherd. Waxaa caafimaad ahaan maximino u eegay [facility] [employer] [id number] - REV 04/24.  Preventing Falls: Care Instructions  Injuries and health problems such as trouble walking or poor eyesight can increase your risk of falling. So can some medicines. But there are things you can do to help prevent falls. You can exercise to get stronger. You can also arrange your  "home to make it safer.    Talk to your doctor about the medicines you take. Ask if any of them increase the risk of falls and whether they can be changed or stopped.   Try to exercise regularly. It can help improve your strength and balance. This can help lower your risk of falling.         Practice fall safety and prevention.   Wear low-heeled shoes that fit well and give your feet good support. Talk to your doctor if you have foot problems that make this hard.  Carry a cellphone or wear a medical alert device that you can use to call for help.  Use stepladders instead of chairs to reach high objects. Don't climb if you're at risk for falls. Ask for help, if needed.  Wear the correct eyeglasses, if you need them.        Make your home safer.   Remove rugs, cords, clutter, and furniture from walkways.  Keep your house well lit. Use night-lights in hallways and bathrooms.  Install and use sturdy handrails on stairways.  Wear nonskid footwear, even inside. Don't walk barefoot or in socks without shoes.        Be safe outside.   Use handrails, curb cuts, and ramps whenever possible.  Keep your hands free by using a shoulder bag or backpack.  Try to walk in well-lit areas. Watch out for uneven ground, changes in pavement, and debris.  Be careful in the winter. Walk on the grass or gravel when sidewalks are slippery. Use de-icer on steps and walkways. Add non-slip devices to shoes.    Put grab bars and nonskid mats in your shower or tub and near the toilet. Try to use a shower chair or bath bench when bathing.   Get into a tub or shower by putting in your weaker leg first. Get out with your strong side first. Have a phone or medical alert device in the bathroom with you.   Where can you learn more?  Go to https://www.[employer].net/patiented  Enter G117 in the search box to learn more about \"Preventing Falls: Care Instructions.\"  Current as of: July 31, 2024  Content Version: 14.5    1821-5478 [facility] [employer] [facility]. "   Care instructions adapted under license by your healthcare professional. If you have questions about a medical condition or this instruction, always ask your healthcare professional. [employer] disclaims any warranty or liability for your use of this information.    Learning About Sleeping Well  What does sleeping well mean?     Sleeping well means getting enough sleep to feel good and stay healthy. How much sleep is enough varies among people.  The number of hours you sleep and how you feel when you wake up are both important. If you do not feel refreshed, you probably need more sleep. Another sign of not getting enough sleep is feeling tired during the day.  Experts recommend that adults get at least 7 or more hours of sleep per day. Children and older adults need more sleep.  Why is getting enough sleep important?  Getting enough quality sleep is a basic part of good health. When your sleep suffers, your physical health, mood, and your thoughts can suffer too. You may find yourself feeling more grumpy or stressed. Not getting enough sleep also can lead to serious problems, including injury, accidents, anxiety, and depression.  What might cause poor sleeping?  Many things can cause sleep problems, including:  Changes to your sleep schedule.  Stress. Stress can be caused by fear about a single event, such as giving a speech. Or you may have ongoing stress, such as worry about work or school.  Depression, anxiety, and other mental or emotional conditions.  Changes in your sleep habits or surroundings. This includes changes that happen where you sleep, such as noise, light, or sleeping in a different bed. It also includes changes in your sleep pattern, such as having jet lag or working a late shift.  Health problems, such as pain, breathing problems, and restless legs syndrome.  Lack of regular exercise.  Using alcohol, nicotine, or caffeine before bed.  How can you help yourself?  Here are some tips  "that may help you sleep more soundly and wake up feeling more refreshed.  Your sleeping area   Use your bedroom only for sleeping and sex. A bit of light reading may help you fall asleep. But if it doesn't, do your reading elsewhere in the house. Try not to use your TV, computer, smartphone, or tablet while you are in bed.  Be sure your bed is big enough to stretch out comfortably, especially if you have a sleep partner.  Keep your bedroom quiet, dark, and cool. Use curtains, blinds, or a sleep mask to block out light. To block out noise, use earplugs, soothing music, or a \"white noise\" machine.  Your evening and bedtime routine   Create a relaxing bedtime routine. You might want to take a warm shower or bath, or listen to soothing music.  Go to bed at the same time every night. And get up at the same time every morning, even if you feel tired.  What to avoid   Limit caffeine (coffee, tea, caffeinated sodas) during the day, and don't have any for at least 6 hours before bedtime.  Avoid drinking alcohol before bedtime. Alcohol can cause you to wake up more often during the night.  Try not to smoke or use tobacco, especially in the evening. Nicotine can keep you awake.  Limit naps during the day, especially close to bedtime.  Avoid lying in bed awake for too long. If you can't fall asleep or if you wake up in the middle of the night and can't get back to sleep within about 20 minutes, get out of bed and go to another room until you feel sleepy.  Avoid taking medicine right before bed that may keep you awake or make you feel hyper or energized. Your doctor can tell you if your medicine may do this and if you can take it earlier in the day.  If you can't sleep   Imagine yourself in a peaceful, pleasant scene. Focus on the details and feelings of being in a place that is relaxing.  Get up and do a quiet or boring activity until you feel sleepy.  Avoid drinking any liquids before going to bed to help prevent waking up " "often to use the bathroom.  Where can you learn more?  Go to https://www.[employer].net/patiented  Enter J942 in the search box to learn more about \"Learning About Sleeping Well.\"  Current as of: July 31, 2024  Content Version: 14.5 2024-2025 5 [employer] and some sugar.   Care instructions adapted under license by your healthcare professional. If you have questions about a medical condition or this instruction, always ask your healthcare professional. [employer] disclaims any warranty or liability for your use of this information.       "

## 2025-07-09 NOTE — PROGRESS NOTES
Preventive Care Visit  [facility]  Samuel Liya Lowe MD, MD, Family Medicine  Jul 9, 2025  {Provider  Link to SmartSet :873948}    {PROVIDER CHARTING PREFERENCE:309747}    Terri Barry is a 89 year old, presenting for the following:  Annual Visit        7/9/2025     3:57 PM   Additional Questions   Roomed by Regino OLIVER MA   Accompanied by Daughter         7/9/2025   Forms   Any forms needing to be completed Yes         7/9/2025     3:57 PM   Patient Reported Additional Medications   Patient reports taking the following new medications None          HPI  Phone  used.     Has  paperwork - needs to be filled out. 5 days per week.     Hand tremors. Its there but not bothering him. It is from aging as per patient.     Advance Care Planning  {The storyboard will display whether the patient has ACP docs on file. Hover over the Code section in the storyboard to access the ACP documents. :488200}  Discussed advance care planning with patient; informed AVS has link to Honoring Choices.          11/1/2023   Social Factors   Worry food won't last until get money to buy more No   Food not last or not have enough money for food? No   Do you have housing? (Housing is defined as stable permanent housing and does not include staying outside in a car, in a tent, in an abandoned building, in an overnight shelter, or couch-surfing.) No   Are you worried about losing your housing? No   Lack of transportation? No   Unable to get utilities (heat,electricity)? No          No data to display                   No data to display                   No data to display                     No data to display                {Rooming Staff Patient needs a PHQ as part of the AWV.  Use this link to complete and then refresh the note to pull results Link to PHQ2 Assessment :319034}    Today's PHQ-2 Score:       7/9/2025     4:18 PM   PHQ-2 ( 1999 Pfizer)   Q1: Little interest or pleasure in  doing things 0    Q2: Feeling down, depressed or hopeless 0    PHQ-2 Score 0    Q1: Little interest or pleasure in doing things Not at all   Q2: Feeling down, depressed or hopeless Not at all   PHQ-2 Score 0       Proxy-reported          No data to display              Social History     Tobacco Use     Smoking status: Never     Smokeless tobacco: Never   Vaping Use     Vaping status: Never Used   Substance Use Topics     Alcohol use: No     Drug use: No     {Provider  If there are gaps in the social history shown above, please follow the link to update and then refresh the note Link to Social and Substance History :703501}    {Provider  REQUIRED FOR AWV Use the storyboard to review patient history, after sections have been marked as reviewed, refresh note to capture documentation:907307}  {Provider   REQUIRED AWV use this link to review and update sexual activity history  after section has been marked as reviewed, refresh note to capture documentation:607358}  Reviewed and updated as needed this visit by Provider                      Current providers sharing in care for this patient include:  Patient Care Team:  Samuel Lowe MD as PCP - General (Family Medicine)  Kofi Webb MD as MD (Family Practice)  Daphne Ochoa RN as Lead Care Coordinator (Primary Care - CC)  Fiorella Uribe AuD as Audiologist (Audiology)  Jailene Nolan MD as MD (Otolaryngology)  Samuel Lowe MD as Assigned PCP    The following health maintenance items are reviewed in Epic and correct as of today:  Health Maintenance   Topic Date Due     DTAP/TDAP/TD VACCINE (1 - Tdap) 11/21/1998     RSV VACCINE (1 - 1-dose 75+ series) Never done     ZOSTER VACCINE (2 of 3) 12/06/2014     DIABETIC FOOT EXAM  11/29/2019     MEDICARE ANNUAL WELLNESS VISIT  12/01/2021     COVID-19 VACCINE (4 - 2024-25 season) 09/01/2024     EYE EXAM  07/08/2025     ANNUAL REVIEW OF HM ORDERS  10/08/2025     INFLUENZA VACCINE  "(1) 09/01/2025     BMP  10/08/2025     LIPID  10/08/2025     MICROALBUMIN  10/08/2025     ADVANCE CARE PLANNING  12/01/2025     A1C  01/09/2026     FALL RISK ASSESSMENT  07/09/2026     PHQ-2 (once per calendar year)  Completed     PNEUMOCOCCAL VACCINE 50+ YEARS  Completed     HPV VACCINE  Aged Out     MENINGITIS VACCINE  Aged Out       {ROS Picklists (Optional):792784}     Objective    Exam  /60 (BP Location: Right arm, Patient Position: Sitting, Cuff Size: Adult Regular)   Pulse 60   Temp 97.2  F (36.2  C) (Temporal)   Resp 18   Ht 1.588 m (5' 2.5\")   Wt 70.9 kg (156 lb 3.2 oz)   SpO2 97%   BMI 28.11 kg/m     Estimated body mass index is 28.11 kg/m  as calculated from the following:    Height as of this encounter: 1.588 m (5' 2.5\").    Weight as of this encounter: 70.9 kg (156 lb 3.2 oz).    Physical Exam  GENERAL: alert and no distress  EYES: Eyes grossly normal to inspection, PERRL and conjunctivae and sclerae normal  HENT: ear canals and TM's normal, nose and mouth without ulcers or lesions  NECK: no adenopathy, no asymmetry, masses, or scars  RESP: lungs clear to auscultation - no rales, rhonchi or wheezes  CV: regular rate and rhythm, normal S1 S2, no S3 or S4, no murmur, click or rub, no peripheral edema  MS: no gross musculoskeletal defects noted, no edema  SKIN: no suspicious lesions or rashes on visible parts   NEURO: Normal strength and tone, mentation intact and speech normal, mild right hand tremors. Walking with cane.   PSYCH: mentation appears normal, affect normal/bright  {Provider  The Mini-Cog is incomplete, use link to complete and refresh note Link to Mini-Cog :022215}      7/9/2025   Mini Cog   Mini-Cog Not Completed (choose reason) Patient declines     {A Mini-Cog total score of 0-2 suggests the possibility of dementia, score of 3-5 suggests no dementia:841343}         Signed Electronically by: Samuel Lowe MD, MD  {Email feedback regarding this note to " [email or web address]   :260152}

## 2025-07-11 ENCOUNTER — RESULTS FOLLOW-UP (OUTPATIENT)
Dept: FAMILY MEDICINE | Facility: CLINIC | Age: 89
End: 2025-07-11
Payer: COMMERCIAL

## 2025-07-11 NOTE — TELEPHONE ENCOUNTER
Called pt with [place]  #[id number]    Granddaughter answered and stated she was going to get pt daughter (CTC on file for Amie).  Call was ended 4 minutes later.    Recall.    MALIK WilksN, PHN, RN-[facility]  [phone]

## 2025-07-16 NOTE — TELEPHONE ENCOUNTER
Writer called and spoke with Amie ([facility]).     Relayed message/results from Dr. Lowe:  Your bad cholesterol is borderline high. Continue to avoid excessive amount of deep saturated fat in your diet. Your kidney function is borderline abnormal. Continue to push fluid. Your liver function test, thyroid, B12 level, urine test for leaking protein, platelets are within normal limit. Hemoglobin is borderline low. We will recheck those labs in 6 months.     Amie stated understanding. Will schedule follow-up lab as the time is closer to 6 months.     Honey Muniz, BSN RN  [facility]

## 2025-07-29 ENCOUNTER — PATIENT OUTREACH (OUTPATIENT)
Dept: GERIATRIC MEDICINE | Facility: CLINIC | Age: 89
End: 2025-07-29
Payer: COMMERCIAL

## 2025-07-29 NOTE — PROGRESS NOTES
[facility] Care Coordination Contact      [facility] Six-Month Telephone Assessment    6 month telephone assessment completed on [id number].    ER visits: No  Hospitalizations: No  TCU stays: No  Significant health status changes: N/A  Falls/Injuries: No  ADL/IADL changes: No  Changes in services: No    Caregiver Assessment follow up:  N/A    Goals: See Support Plan for goal progress documentation.      Will see member in 6 months for an annual health risk assessment.   Encouraged member to call CC with any questions or concerns in the meantime.     Daphne Ochoa RN BSN PHN      [facility]  Care Coordinator  Phone:   [phone]  Fax:       [phone]